# Patient Record
Sex: MALE | Race: WHITE | NOT HISPANIC OR LATINO | Employment: FULL TIME | URBAN - METROPOLITAN AREA
[De-identification: names, ages, dates, MRNs, and addresses within clinical notes are randomized per-mention and may not be internally consistent; named-entity substitution may affect disease eponyms.]

---

## 2015-01-07 LAB — CRC RECOMMENDATION EXT: NORMAL

## 2017-05-05 ENCOUNTER — OFFICE VISIT (OUTPATIENT)
Dept: INTERNAL MEDICINE | Facility: CLINIC | Age: 52
End: 2017-05-05
Payer: COMMERCIAL

## 2017-05-05 VITALS
HEIGHT: 65 IN | DIASTOLIC BLOOD PRESSURE: 74 MMHG | BODY MASS INDEX: 30.93 KG/M2 | OXYGEN SATURATION: 98 % | WEIGHT: 185.63 LBS | HEART RATE: 61 BPM | SYSTOLIC BLOOD PRESSURE: 114 MMHG | TEMPERATURE: 97 F

## 2017-05-05 DIAGNOSIS — M54.9 BACK PAIN, UNSPECIFIED BACK LOCATION, UNSPECIFIED BACK PAIN LATERALITY, UNSPECIFIED CHRONICITY: ICD-10-CM

## 2017-05-05 DIAGNOSIS — T14.8XXA MUSCLE STRAIN: Primary | ICD-10-CM

## 2017-05-05 DIAGNOSIS — Z00.00 PREVENTATIVE HEALTH CARE: ICD-10-CM

## 2017-05-05 PROCEDURE — 99999 PR PBB SHADOW E&M-EST. PATIENT-LVL III: CPT | Mod: PBBFAC,,, | Performed by: INTERNAL MEDICINE

## 2017-05-05 PROCEDURE — 1160F RVW MEDS BY RX/DR IN RCRD: CPT | Mod: S$GLB,,, | Performed by: INTERNAL MEDICINE

## 2017-05-05 PROCEDURE — 99214 OFFICE O/P EST MOD 30 MIN: CPT | Mod: S$GLB,,, | Performed by: INTERNAL MEDICINE

## 2017-05-05 RX ORDER — PHENYLPROPANOLAMINE/CLEMASTINE 75-1.34MG
2 TABLET, EXTENDED RELEASE ORAL
COMMUNITY
End: 2018-02-28 | Stop reason: ALTCHOICE

## 2017-05-05 RX ORDER — CYCLOBENZAPRINE HCL 10 MG
10 TABLET ORAL 3 TIMES DAILY PRN
Qty: 30 TABLET | Refills: 0 | Status: SHIPPED | OUTPATIENT
Start: 2017-05-05 | End: 2017-06-02

## 2017-05-05 RX ORDER — NAPROXEN 500 MG/1
500 TABLET ORAL 2 TIMES DAILY WITH MEALS
Qty: 30 TABLET | Refills: 0 | Status: SHIPPED | OUTPATIENT
Start: 2017-05-05 | End: 2017-07-04

## 2017-05-05 NOTE — MR AVS SNAPSHOT
[facility]  [address]  [place] [address]  Phone: [phone]  Fax: [phone]                  Vinny Stover   2017 2:40 PM   Office Visit    Description:  Male : 1965   Provider:  Juan Best MD   Department:  [facility]           Reason for Visit     Back Pain           Diagnoses this Visit        Comments    Muscle strain    -  Primary     Back pain, unspecified back location, unspecified back pain laterality, unspecified chronicity         Preventative health care                To Do List           Future Appointments        Provider Department Dept Phone    2017 7:45 AM LABORATORY, [facility] [phone]    2017 2:20 PM Juan Best MD [facility] [phone]      Goals (5 Years of Data)     None      Follow-Up and Disposition     Return in about 4 weeks (around 2017), or if symptoms worsen or fail to improve.       These Medications        Disp Refills Start End    naproxen (NAPROSYN) 500 MG tablet 30 tablet 0 2017    Take 1 tablet (500 mg total) by mouth 2 (two) times daily with meals. For pain / inflammation - Oral    Pharmacy: [id number]  [address] Ph #: [phone]       cyclobenzaprine (FLEXERIL) 10 MG tablet 30 tablet 0 2017 5/15/2017    Take 1 tablet (10 mg total) by mouth 3 (three) times daily as needed for Muscle spasms. - Oral    Pharmacy: [facility] - [address] Ph #: [phone]         [facility]     [facility] Nurse Care Line -  Assistance  Unless otherwise directed by your provider, please contact [facility], our nurse care line that is available for  assistance.     Registered nurses in the [facility] provide: appointment scheduling, clinical advisement, health education, and other advisory services.  Call: [phone] (toll  "free)               Medications           Message regarding Medications     Verify the changes and/or additions to your medication regime listed below are the same as discussed with your clinician today.  If any of these changes or additions are incorrect, please notify your healthcare provider.        START taking these NEW medications        Refills    naproxen (NAPROSYN) 500 MG tablet 0    Sig: Take 1 tablet (500 mg total) by mouth 2 (two) times daily with meals. For pain / inflammation    Class: Normal    Route: Oral    cyclobenzaprine (FLEXERIL) 10 MG tablet 0    Sig: Take 1 tablet (10 mg total) by mouth 3 (three) times daily as needed for Muscle spasms.    Class: Normal    Route: Oral           Verify that the below list of medications is an accurate representation of the medications you are currently taking.  If none reported, the list may be blank. If incorrect, please contact your healthcare provider. Carry this list with you in case of emergency.           Current Medications     ibuprofen (ADVIL LIQUI-GEL) 200 mg Cap Take 2 capsules by mouth every 4 to 6 hours as needed.    cyclobenzaprine (FLEXERIL) 10 MG tablet Take 1 tablet (10 mg total) by mouth 3 (three) times daily as needed for Muscle spasms.    naproxen (NAPROSYN) 500 MG tablet Take 1 tablet (500 mg total) by mouth 2 (two) times daily with meals. For pain / inflammation           Clinical Reference Information           Your Vitals Were     BP Pulse Temp Height Weight SpO2    114/74 (BP Location: Right arm, Patient Position: Sitting) 61 97 °F (36.1 °C) (Tympanic) 5' 5" (1.651 m) 84.2 kg (185 lb 10 oz) 98%    BMI                30.89 kg/m2          Blood Pressure          Most Recent Value    BP  114/74      Allergies as of 5/5/2017     No Known Allergies      Immunizations Administered on Date of Encounter - 5/5/2017     None      Orders Placed During Today's Visit     Future Labs/Procedures Expected by Expires    CBC auto differential  5/5/2017 " 7/4/2018    Comprehensive metabolic panel  5/5/2017 7/4/2018    Hemoglobin A1c  5/5/2017 5/5/2018    Lipid panel  5/5/2017 7/4/2018    PSA, Screening  5/5/2017 7/4/2018    TSH  5/5/2017 7/4/2018    Vitamin D  5/5/2017 7/4/2018      [facility] Sign-Up     Activating your [facility] account is as easy as 1-2-3!     1) Visit [email or web address], select Sign Up Now, enter this activation code and your date of birth, then select Next.  [email or web address]  Expires: 6/19/2017  3:09 PM      2) Create a username and password to use when you visit [facility] in the future and select a security question in case you lose your password and select Next.    3) Enter your e-mail address and click Sign Up!    Additional Information  If you have questions, please e-mail [email or web address] or call [phone] to talk to our [facility] staff. Remember, [facility] is NOT to be used for urgent needs. For medical emergencies, dial 911.         Language Assistance Services     ATTENTION: Language assistance services are available, free of charge. Please call [phone].      ATENCIÓN: Si habla español, tiene a babin disposición servicios gratuitos de asistencia lingüística. Llame al [phone].     CHÚ Ý: N?u b?n nói Ti?ng Vi?t, có các d?ch v? h? tr? ngôn ng? mi?n phí dành cho b?n. G?i s? [phone].         [facility] complies with applicable Federal civil rights laws and does not discriminate on the basis of race, color, national origin, age, disability, or sex.

## 2017-05-05 NOTE — PROGRESS NOTES
"Subjective:      Patient ID: Vinny Stover is a 52 y.o. male.    Chief Complaint: Back Pain (lower, x 2 weeks)    HPI Comments: 53 yo with There is no problem list on file for this patient.    Past Medical History:  No date: Colon polyp        Back Pain   This is a new problem. The current episode started 1 to 4 weeks ago. The problem occurs intermittently. The problem has been gradually worsening since onset. Pain location: left lower back. The quality of the pain is described as aching. The pain does not radiate. Pain severity now: mod to severe. Exacerbated by: walking longer distances. Stiffness is present at night. Pertinent negatives include no leg pain or numbness. Risk factors include obesity. He has tried ice and heat (advil 400 bid) for the symptoms. The treatment provided moderate relief.     Review of Systems   Musculoskeletal: Positive for back pain.   Neurological: Negative for numbness.     Objective:   /74 (BP Location: Right arm, Patient Position: Sitting)  Pulse 61  Temp 97 °F (36.1 °C) (Tympanic)   Ht 5' 5" (1.651 m)  Wt 84.2 kg (185 lb 10 oz)  SpO2 98%  BMI 30.89 kg/m2    Physical Exam   Constitutional: He is oriented to person, place, and time. He appears well-developed and well-nourished. No distress.   Eyes: Conjunctivae and EOM are normal.   Cardiovascular: Normal rate and regular rhythm.    Pulmonary/Chest: Effort normal and breath sounds normal.   Abdominal: Soft. Bowel sounds are normal.   Musculoskeletal: He exhibits no edema.        Lumbar back: He exhibits normal range of motion, no tenderness, no bony tenderness and no swelling.   Pain reproduced with extension of back.    Neurological: He is oriented to person, place, and time. He has normal reflexes. He displays normal reflexes. He exhibits normal muscle tone.   Skin: Skin is warm and dry.   Psychiatric: He has a normal mood and affect. His behavior is normal.       Assessment:     1. Muscle strain    2. Back pain, " unspecified back location, unspecified back pain laterality, unspecified chronicity    3. Preventative health care      Plan:   Muscle strain  -     naproxen (NAPROSYN) 500 MG tablet; Take 1 tablet (500 mg total) by mouth 2 (two) times daily with meals. For pain / inflammation  Dispense: 30 tablet; Refill: 0  -     cyclobenzaprine (FLEXERIL) 10 MG tablet; Take 1 tablet (10 mg total) by mouth 3 (three) times daily as needed for Muscle spasms.  Dispense: 30 tablet; Refill: 0    Back pain, unspecified back location, unspecified back pain laterality, unspecified chronicity  -     naproxen (NAPROSYN) 500 MG tablet; Take 1 tablet (500 mg total) by mouth 2 (two) times daily with meals. For pain / inflammation  Dispense: 30 tablet; Refill: 0  -     cyclobenzaprine (FLEXERIL) 10 MG tablet; Take 1 tablet (10 mg total) by mouth 3 (three) times daily as needed for Muscle spasms.  Dispense: 30 tablet; Refill: 0    Preventative health care  -     CBC auto differential; Future; Expected date: 5/5/17  -     Comprehensive metabolic panel; Future; Expected date: 5/5/17  -     Hemoglobin A1c; Future; Expected date: 5/5/17  -     Lipid panel; Future; Expected date: 5/5/17  -     Vitamin D; Future; Expected date: 5/5/17  -     TSH; Future; Expected date: 5/5/17  -     PSA, Screening; Future; Expected date: 5/5/17              Return in about 4 weeks (around 6/2/2017), or if symptoms worsen or fail to improve.

## 2017-05-12 ENCOUNTER — TELEPHONE (OUTPATIENT)
Dept: INTERNAL MEDICINE | Facility: CLINIC | Age: 52
End: 2017-05-12

## 2017-05-12 DIAGNOSIS — M54.9 BACK PAIN, UNSPECIFIED BACK LOCATION, UNSPECIFIED BACK PAIN LATERALITY, UNSPECIFIED CHRONICITY: Primary | ICD-10-CM

## 2017-05-12 NOTE — TELEPHONE ENCOUNTER
Pt stated his back is still hurting.  Stated he is taking Flexeril only at night and naproxen twice daily.  Wants to know if he needs to come back in to see you or if he needs to be seen by physiatry.  Please advise.

## 2017-05-12 NOTE — TELEPHONE ENCOUNTER
----- Message from Shaunna Solano sent at 5/12/2017  2:04 PM CDT -----  Patient calling to speak to nurse...States he was recently seen in the office and the back pain is not getting better. Would like to discuss some treatments. Please adv/call [phone].//thanks. cw

## 2017-05-12 NOTE — TELEPHONE ENCOUNTER
Spoke with pt, notified him that a physiatry referral has been placed. Patient scheduled appointment for 5/22/17 at 9 am. Pt also stated that if he takes his medicine and feels better then he will cancel appointment.

## 2017-05-22 ENCOUNTER — LAB VISIT (OUTPATIENT)
Dept: LAB | Facility: HOSPITAL | Age: 52
End: 2017-05-22
Attending: INTERNAL MEDICINE
Payer: COMMERCIAL

## 2017-05-22 DIAGNOSIS — Z00.00 PREVENTATIVE HEALTH CARE: ICD-10-CM

## 2017-05-22 LAB
25(OH)D3+25(OH)D2 SERPL-MCNC: 22 NG/ML
ALBUMIN SERPL BCP-MCNC: 3.9 G/DL
ALP SERPL-CCNC: 80 U/L
ALT SERPL W/O P-5'-P-CCNC: 39 U/L
ANION GAP SERPL CALC-SCNC: 8 MMOL/L
AST SERPL-CCNC: 26 U/L
BASOPHILS # BLD AUTO: 0.03 K/UL
BASOPHILS NFR BLD: 0.4 %
BILIRUB SERPL-MCNC: 0.4 MG/DL
BUN SERPL-MCNC: 11 MG/DL
CALCIUM SERPL-MCNC: 9.2 MG/DL
CHLORIDE SERPL-SCNC: 106 MMOL/L
CHOLEST/HDLC SERPL: 3.9 {RATIO}
CO2 SERPL-SCNC: 27 MMOL/L
COMPLEXED PSA SERPL-MCNC: 1.9 NG/ML
CREAT SERPL-MCNC: 1.1 MG/DL
DIFFERENTIAL METHOD: ABNORMAL
EOSINOPHIL # BLD AUTO: 0.5 K/UL
EOSINOPHIL NFR BLD: 7.3 %
ERYTHROCYTE [DISTWIDTH] IN BLOOD BY AUTOMATED COUNT: 12.7 %
EST. GFR  (AFRICAN AMERICAN): >60 ML/MIN/1.73 M^2
EST. GFR  (NON AFRICAN AMERICAN): >60 ML/MIN/1.73 M^2
GLUCOSE SERPL-MCNC: 101 MG/DL
HCT VFR BLD AUTO: 42.1 %
HDL/CHOLESTEROL RATIO: 25.8 %
HDLC SERPL-MCNC: 159 MG/DL
HDLC SERPL-MCNC: 41 MG/DL
HGB BLD-MCNC: 14.8 G/DL
LDLC SERPL CALC-MCNC: 100.2 MG/DL
LYMPHOCYTES # BLD AUTO: 2.4 K/UL
LYMPHOCYTES NFR BLD: 32.8 %
MCH RBC QN AUTO: 31.9 PG
MCHC RBC AUTO-ENTMCNC: 35.2 %
MCV RBC AUTO: 91 FL
MONOCYTES # BLD AUTO: 0.7 K/UL
MONOCYTES NFR BLD: 9.9 %
NEUTROPHILS # BLD AUTO: 3.6 K/UL
NEUTROPHILS NFR BLD: 49.3 %
NONHDLC SERPL-MCNC: 118 MG/DL
PLATELET # BLD AUTO: 225 K/UL
PMV BLD AUTO: 11.4 FL
POTASSIUM SERPL-SCNC: 4.4 MMOL/L
PROT SERPL-MCNC: 6.8 G/DL
RBC # BLD AUTO: 4.64 M/UL
SODIUM SERPL-SCNC: 141 MMOL/L
TRIGL SERPL-MCNC: 89 MG/DL
TSH SERPL DL<=0.005 MIU/L-ACNC: 3.08 UIU/ML
WBC # BLD AUTO: 7.29 K/UL

## 2017-05-22 PROCEDURE — 83036 HEMOGLOBIN GLYCOSYLATED A1C: CPT

## 2017-05-22 PROCEDURE — 36415 COLL VENOUS BLD VENIPUNCTURE: CPT | Mod: PO

## 2017-05-22 PROCEDURE — 80061 LIPID PANEL: CPT

## 2017-05-22 PROCEDURE — 84153 ASSAY OF PSA TOTAL: CPT

## 2017-05-22 PROCEDURE — 82306 VITAMIN D 25 HYDROXY: CPT

## 2017-05-22 PROCEDURE — 85025 COMPLETE CBC W/AUTO DIFF WBC: CPT

## 2017-05-22 PROCEDURE — 84443 ASSAY THYROID STIM HORMONE: CPT

## 2017-05-22 PROCEDURE — 80053 COMPREHEN METABOLIC PANEL: CPT

## 2017-05-23 LAB
ESTIMATED AVG GLUCOSE: 120 MG/DL
HBA1C MFR BLD HPLC: 5.8 %

## 2017-06-02 ENCOUNTER — OFFICE VISIT (OUTPATIENT)
Dept: INTERNAL MEDICINE | Facility: CLINIC | Age: 52
End: 2017-06-02
Payer: COMMERCIAL

## 2017-06-02 ENCOUNTER — HOSPITAL ENCOUNTER (OUTPATIENT)
Dept: RADIOLOGY | Facility: HOSPITAL | Age: 52
Discharge: HOME | End: 2017-06-02
Attending: INTERNAL MEDICINE
Payer: COMMERCIAL

## 2017-06-02 ENCOUNTER — PATIENT OUTREACH (OUTPATIENT)
Dept: ADMINISTRATIVE | Facility: HOSPITAL | Age: 52
End: 2017-06-02
Payer: COMMERCIAL

## 2017-06-02 VITALS
SYSTOLIC BLOOD PRESSURE: 122 MMHG | OXYGEN SATURATION: 99 % | HEIGHT: 65 IN | WEIGHT: 186.75 LBS | HEART RATE: 75 BPM | DIASTOLIC BLOOD PRESSURE: 82 MMHG | BODY MASS INDEX: 31.11 KG/M2 | TEMPERATURE: 97 F

## 2017-06-02 DIAGNOSIS — M54.9 BACK PAIN, UNSPECIFIED BACK LOCATION, UNSPECIFIED BACK PAIN LATERALITY, UNSPECIFIED CHRONICITY: ICD-10-CM

## 2017-06-02 DIAGNOSIS — R73.03 PREDIABETES: ICD-10-CM

## 2017-06-02 DIAGNOSIS — E55.9 VITAMIN D INSUFFICIENCY: ICD-10-CM

## 2017-06-02 DIAGNOSIS — Z00.00 ROUTINE GENERAL MEDICAL EXAMINATION AT A HEALTH CARE FACILITY: Primary | ICD-10-CM

## 2017-06-02 PROCEDURE — 99999 PR PBB SHADOW E&M-EST. PATIENT-LVL IV: CPT | Mod: PBBFAC,,, | Performed by: INTERNAL MEDICINE

## 2017-06-02 PROCEDURE — 72100 X-RAY EXAM L-S SPINE 2/3 VWS: CPT | Mod: TC,PO

## 2017-06-02 PROCEDURE — 72100 X-RAY EXAM L-S SPINE 2/3 VWS: CPT | Mod: 26,,, | Performed by: RADIOLOGY

## 2017-06-02 PROCEDURE — 99396 PREV VISIT EST AGE 40-64: CPT | Mod: S$GLB,,, | Performed by: INTERNAL MEDICINE

## 2017-06-02 NOTE — PROGRESS NOTES
"Subjective:      Patient ID: Vinny Stover is a 52 y.o. male.    Chief Complaint: Follow-up    51 yo with There is no problem list on file for this patient.  Past Medical History:  No date: Colon polyp    Here today for prevent annual exam. Feeling well today but continues with back pain. Energy good. Not exercising much. Never smoked.       Review of Systems   Constitutional: Negative for chills and fever.   HENT: Negative for ear pain and sore throat.    Respiratory: Negative for cough.    Cardiovascular: Negative for chest pain.   Gastrointestinal: Negative for abdominal pain and blood in stool.   Genitourinary: Negative for dysuria and hematuria.   Neurological: Negative for seizures and syncope.     Objective:   /82 (BP Location: Right arm, Patient Position: Sitting)   Pulse 75   Temp 97.2 °F (36.2 °C) (Tympanic)   Ht 5' 5" (1.651 m)   Wt 84.7 kg (186 lb 11.7 oz)   SpO2 99%   BMI 31.07 kg/m²     Physical Exam   Constitutional: He is oriented to person, place, and time. He appears well-developed and well-nourished. No distress.   HENT:   Head: Normocephalic and atraumatic.   Mouth/Throat: Oropharynx is clear and moist.   Eyes: EOM are normal. Pupils are equal, round, and reactive to light.   Neck: Neck supple. Carotid bruit is not present. No thyromegaly present.   Cardiovascular: Normal rate and regular rhythm.    Pulmonary/Chest: Breath sounds normal. He has no wheezes. He has no rales.   Abdominal: Soft. Bowel sounds are normal. There is no tenderness.   Musculoskeletal: He exhibits no edema.   Lymphadenopathy:     He has no cervical adenopathy.   Neurological: He is alert and oriented to person, place, and time.   Skin: Skin is warm and dry.   Psychiatric: He has a normal mood and affect. His behavior is normal.     Lab Visit on 05/22/2017   Component Date Value Ref Range Status    WBC 05/22/2017 7.29  3.90 - 12.70 K/uL Final    RBC 05/22/2017 4.64  4.60 - 6.20 M/uL Final    Hemoglobin " 05/22/2017 14.8  14.0 - 18.0 g/dL Final    Hematocrit 05/22/2017 42.1  40.0 - 54.0 % Final    MCV 05/22/2017 91  82 - 98 fL Final    MCH 05/22/2017 31.9* 27.0 - 31.0 pg Final    MCHC 05/22/2017 35.2  32.0 - 36.0 % Final    RDW 05/22/2017 12.7  11.5 - 14.5 % Final    Platelets 05/22/2017 225  150 - 350 K/uL Final    MPV 05/22/2017 11.4  9.2 - 12.9 fL Final    Gran # 05/22/2017 3.6  1.8 - 7.7 K/uL Final    Lymph # 05/22/2017 2.4  1.0 - 4.8 K/uL Final    Mono # 05/22/2017 0.7  0.3 - 1.0 K/uL Final    Eos # 05/22/2017 0.5  0.0 - 0.5 K/uL Final    Baso # 05/22/2017 0.03  0.00 - 0.20 K/uL Final    Gran% 05/22/2017 49.3  38.0 - 73.0 % Final    Lymph% 05/22/2017 32.8  18.0 - 48.0 % Final    Mono% 05/22/2017 9.9  4.0 - 15.0 % Final    Eosinophil% 05/22/2017 7.3  0.0 - 8.0 % Final    Basophil% 05/22/2017 0.4  0.0 - 1.9 % Final    Differential Method 05/22/2017 Automated   Final    Sodium 05/22/2017 141  136 - 145 mmol/L Final    Potassium 05/22/2017 4.4  3.5 - 5.1 mmol/L Final    Chloride 05/22/2017 106  95 - 110 mmol/L Final    CO2 05/22/2017 27  23 - 29 mmol/L Final    Glucose 05/22/2017 101  70 - 110 mg/dL Final    BUN, Bld 05/22/2017 11  6 - 20 mg/dL Final    Creatinine 05/22/2017 1.1  0.5 - 1.4 mg/dL Final    Calcium 05/22/2017 9.2  8.7 - 10.5 mg/dL Final    Total Protein 05/22/2017 6.8  6.0 - 8.4 g/dL Final    Albumin 05/22/2017 3.9  3.5 - 5.2 g/dL Final    Total Bilirubin 05/22/2017 0.4  0.1 - 1.0 mg/dL Final    Comment: For infants and newborns, interpretation of results should be based  on gestational age, weight and in agreement with clinical  observations.  Premature Infant recommended reference ranges:  Up to 24 hours.............<8.0 mg/dL  Up to 48 hours............<12.0 mg/dL  3-5 days..................<15.0 mg/dL  6-29 days.................<15.0 mg/dL      Alkaline Phosphatase 05/22/2017 80  55 - 135 U/L Final    AST 05/22/2017 26  10 - 40 U/L Final    ALT 05/22/2017 39  10 - 44  U/L Final    Anion Gap 05/22/2017 8  8 - 16 mmol/L Final    eGFR if African American 05/22/2017 >60.0  >60 mL/min/1.73 m^2 Final    eGFR if non African American 05/22/2017 >60.0  >60 mL/min/1.73 m^2 Final    Comment: Calculation used to obtain the estimated glomerular filtration  rate (eGFR) is the CKD-EPI equation. Since race is unknown   in our information system, the eGFR values for   -American and Non--American patients are given   for each creatinine result.      Hemoglobin A1C 05/23/2017 5.8  4.5 - 6.2 % Final    Comment: According to ADA guidelines, hemoglobin A1C <7.0% represents  optimal control in non-pregnant diabetic patients.  Different  metrics may apply to specific populations.   Standards of Medical Care in Diabetes - 2016.  For the purpose of screening for the presence of diabetes:  <5.7%     Consistent with the absence of diabetes  5.7-6.4%  Consistent with increasing risk for diabetes   (prediabetes)  >or=6.5%  Consistent with diabetes  Currently no consensus exists for use of hemoglobin A1C  for diagnosis of diabetes for children.      Estimated Avg Glucose 05/23/2017 120  68 - 131 mg/dL Final    Cholesterol 05/22/2017 159  120 - 199 mg/dL Final    Comment: The National Cholesterol Education Program (NCEP) has set the  following guidelines (reference ranges) for Cholesterol:  Optimal.....................<200 mg/dL  Borderline High.............200-239 mg/dL  High........................> or = 240 mg/dL      Triglycerides 05/22/2017 89  30 - 150 mg/dL Final    Comment: The National Cholesterol Education Program (NCEP) has set the  following guidelines (reference values) for triglycerides:  Normal......................<150 mg/dL  Borderline High.............150-199 mg/dL  High........................200-499 mg/dL      HDL 05/22/2017 41  40 - 75 mg/dL Final    Comment: The National Cholesterol Education Program (NCEP) has set the  following guidelines (reference values) for HDL  Cholesterol:  Low...............<40 mg/dL  Optimal...........>60 mg/dL      LDL Cholesterol 05/22/2017 100.2  63.0 - 159.0 mg/dL Final    Comment: The National Cholesterol Education Program (NCEP) has set the  following guidelines (reference values) for LDL Cholesterol:  Optimal.......................<130 mg/dL  Borderline High...............130-159 mg/dL  High..........................160-189 mg/dL  Very High.....................>190 mg/dL      HDL/Chol Ratio 05/22/2017 25.8  20.0 - 50.0 % Final    Total Cholesterol/HDL Ratio 05/22/2017 3.9  2.0 - 5.0 Final    Non-HDL Cholesterol 05/22/2017 118  mg/dL Final    Comment: Risk category and Non-HDL cholesterol goals:  Coronary heart disease (CHD)or equivalent (10-year risk of CHD >20%):  Non-HDL cholesterol goal     <130 mg/dL  Two or more CHD risk factors and 10-year risk of CHD <= 20%:  Non-HDL cholesterol goal     <160 mg/dL  0 to 1 CHD risk factor:  Non-HDL cholesterol goal     <190 mg/dL      Vit D, 25-Hydroxy 05/22/2017 22* 30 - 96 ng/mL Final    Comment: Vitamin D deficiency.........<10 ng/mL                              Vitamin D insufficiency......10-29 ng/mL       Vitamin D sufficiency........> or equal to 30 ng/mL  Vitamin D toxicity............>100 ng/mL      TSH 05/22/2017 3.085  0.400 - 4.000 uIU/mL Final    PSA, SCREEN 05/22/2017 1.9  0.00 - 4.00 ng/mL Final    Comment: PSA Expected levels:  Hormonal Therapy: <0.05 ng/ml  Prostatectomy: <0.01 ng/ml  Radiation Therapy: <1.00 ng/ml     .      Assessment:     1. Routine general medical examination at a health care facility    2. Prediabetes    3. Vitamin D insufficiency    4. Back pain, unspecified back location, unspecified back pain laterality, unspecified chronicity      Plan:   Routine general medical examination at a health care facility  -     PSA, Screening; Future; Expected date: 06/02/2018  -     TSH; Future; Expected date: 06/02/2018  -     Vitamin D; Future; Expected date: 06/02/2018  -      Lipid panel; Future; Expected date: 06/02/2018  -     Comprehensive metabolic panel; Future; Expected date: 06/02/2018  -     Hemoglobin A1c; Future; Expected date: 06/02/2018  -     CBC auto differential; Future; Expected date: 06/02/2018    Prediabetes    Vitamin D insufficiency    Back pain, unspecified back location, unspecified back pain laterality, unspecified chronicity  -     Ambulatory referral to Physical Medicine Rehab  -     X-Ray Lumbar Spine Ap And Lateral; Future; Expected date: 06/02/2017    Vitamin D3 over counter 1000 IU daily    Diet, exercise, wt loss. Diabetic diet discussed.        Return in about 1 year (around 6/2/2018), or if symptoms worsen or fail to improve.

## 2017-06-12 ENCOUNTER — TELEPHONE (OUTPATIENT)
Dept: INTERNAL MEDICINE | Facility: CLINIC | Age: 52
End: 2017-06-12

## 2017-06-12 NOTE — TELEPHONE ENCOUNTER
Notified pt that x-ray showed arthritis with recommendation to continue current treatment plan.  Pt verbalized understanding.

## 2017-06-12 NOTE — TELEPHONE ENCOUNTER
----- Message from Marli Madrid sent at 6/12/2017  9:03 AM CDT -----  Contact: Patient  Patient returned call. He can be contacted at [phone].    Thanks,  Marli

## 2017-06-12 NOTE — TELEPHONE ENCOUNTER
----- Message from Marie Gutierrez sent at 6/12/2017  8:17 AM CDT -----  Contact: pt  Pt is calling nurse staff to discuss  patient xray results. Pt call back [phone] to be advised thanks

## 2018-01-17 ENCOUNTER — OFFICE VISIT (OUTPATIENT)
Dept: INTERNAL MEDICINE | Facility: CLINIC | Age: 53
End: 2018-01-17
Payer: COMMERCIAL

## 2018-01-17 VITALS
HEIGHT: 65 IN | WEIGHT: 183 LBS | SYSTOLIC BLOOD PRESSURE: 110 MMHG | DIASTOLIC BLOOD PRESSURE: 70 MMHG | BODY MASS INDEX: 30.49 KG/M2 | HEART RATE: 77 BPM | TEMPERATURE: 97 F

## 2018-01-17 DIAGNOSIS — J32.0 MAXILLARY SINUSITIS, UNSPECIFIED CHRONICITY: Primary | ICD-10-CM

## 2018-01-17 DIAGNOSIS — R05.9 COUGH: ICD-10-CM

## 2018-01-17 PROCEDURE — 99214 OFFICE O/P EST MOD 30 MIN: CPT | Mod: S$GLB,,, | Performed by: FAMILY MEDICINE

## 2018-01-17 PROCEDURE — 99999 PR PBB SHADOW E&M-EST. PATIENT-LVL III: CPT | Mod: PBBFAC,,, | Performed by: FAMILY MEDICINE

## 2018-01-17 RX ORDER — PROMETHAZINE HYDROCHLORIDE AND DEXTROMETHORPHAN HYDROBROMIDE 6.25; 15 MG/5ML; MG/5ML
5 SYRUP ORAL 2 TIMES DAILY PRN
Qty: 100 ML | Refills: 0 | Status: SHIPPED | OUTPATIENT
Start: 2018-01-17 | End: 2018-01-17 | Stop reason: SDUPTHER

## 2018-01-17 RX ORDER — PROMETHAZINE HYDROCHLORIDE AND DEXTROMETHORPHAN HYDROBROMIDE 6.25; 15 MG/5ML; MG/5ML
5 SYRUP ORAL 2 TIMES DAILY PRN
Qty: 100 ML | Refills: 0 | Status: SHIPPED | OUTPATIENT
Start: 2018-01-17 | End: 2018-01-24

## 2018-01-17 RX ORDER — AMOXICILLIN AND CLAVULANATE POTASSIUM 500; 125 MG/1; MG/1
1 TABLET, FILM COATED ORAL 2 TIMES DAILY
Qty: 20 TABLET | Refills: 0 | Status: SHIPPED | OUTPATIENT
Start: 2018-01-17 | End: 2018-01-17 | Stop reason: SDUPTHER

## 2018-01-17 RX ORDER — AMOXICILLIN AND CLAVULANATE POTASSIUM 500; 125 MG/1; MG/1
1 TABLET, FILM COATED ORAL 2 TIMES DAILY
Qty: 20 TABLET | Refills: 0 | Status: SHIPPED | OUTPATIENT
Start: 2018-01-17 | End: 2018-01-29 | Stop reason: ALTCHOICE

## 2018-01-17 NOTE — PROGRESS NOTES
"Subjective:       Patient ID: Vinny Stover is a 52 y.o. male.    Chief Complaint: Dysphagia    F/U:       Pt is a 52 year old who is presenting with cough and sore throat but feels he has been also having difficulties sleeping at night "it just feels like something stuck." Pt has not had any fevers with sinus congestion and pressure.       Review of Systems   Constitutional: Negative.    HENT: Positive for sinus pain and sinus pressure.    Respiratory: Positive for cough. Negative for choking and shortness of breath.    Cardiovascular: Negative.    Gastrointestinal: Negative.    Musculoskeletal: Negative.    Hematological: Negative.    Psychiatric/Behavioral: Negative.        Objective:      Physical Exam   Constitutional: He appears well-developed and well-nourished.   HENT:   Right Ear: Tympanic membrane is erythematous.   Left Ear: Tympanic membrane is erythematous.   Nose: Mucosal edema present. Right sinus exhibits maxillary sinus tenderness. Left sinus exhibits maxillary sinus tenderness.   Mouth/Throat: Posterior oropharyngeal erythema present.   Cardiovascular: Regular rhythm.  Exam reveals no friction rub.    No murmur heard.  Pulmonary/Chest: Effort normal and breath sounds normal. He has no wheezes. He has no rales.       Assessment:       1. Maxillary sinusitis, unspecified chronicity    2. Cough        Plan:       Maxillary sinusitis, unspecified chronicity  Comments:  Augmentin 500 mg bid    Cough  Comments:  Phenergan DM    Other orders  -     Discontinue: amoxicillin-clavulanate 500-125mg (AUGMENTIN) 500-125 mg Tab; Take 1 tablet (500 mg total) by mouth 2 (two) times daily.  Dispense: 20 tablet; Refill: 0  -     Discontinue: promethazine-dextromethorphan (PROMETHAZINE-DM) 6.25-15 mg/5 mL Syrp; Take 5 mLs by mouth 2 (two) times daily as needed.  Dispense: 100 mL; Refill: 0  -     promethazine-dextromethorphan (PROMETHAZINE-DM) 6.25-15 mg/5 mL Syrp; Take 5 mLs by mouth 2 (two) times daily as " needed.  Dispense: 100 mL; Refill: 0  -     amoxicillin-clavulanate 500-125mg (AUGMENTIN) 500-125 mg Tab; Take 1 tablet (500 mg total) by mouth 2 (two) times daily.  Dispense: 20 tablet; Refill: 0

## 2018-01-24 ENCOUNTER — OFFICE VISIT (OUTPATIENT)
Dept: INTERNAL MEDICINE | Facility: CLINIC | Age: 53
End: 2018-01-24
Payer: COMMERCIAL

## 2018-01-24 VITALS
WEIGHT: 181 LBS | DIASTOLIC BLOOD PRESSURE: 88 MMHG | HEART RATE: 72 BPM | HEIGHT: 65 IN | BODY MASS INDEX: 30.16 KG/M2 | TEMPERATURE: 98 F | SYSTOLIC BLOOD PRESSURE: 132 MMHG | OXYGEN SATURATION: 99 %

## 2018-01-24 DIAGNOSIS — J02.9 SORE THROAT: ICD-10-CM

## 2018-01-24 DIAGNOSIS — J06.9 URI WITH COUGH AND CONGESTION: Primary | ICD-10-CM

## 2018-01-24 PROCEDURE — 99999 PR PBB SHADOW E&M-EST. PATIENT-LVL III: CPT | Mod: PBBFAC,,, | Performed by: FAMILY MEDICINE

## 2018-01-24 PROCEDURE — 99214 OFFICE O/P EST MOD 30 MIN: CPT | Mod: S$GLB,,, | Performed by: FAMILY MEDICINE

## 2018-01-24 RX ORDER — PREDNISONE 20 MG/1
TABLET ORAL
COMMUNITY
Start: 2018-01-21 | End: 2018-01-29 | Stop reason: ALTCHOICE

## 2018-01-24 NOTE — PROGRESS NOTES
"PROBLEM/CONDITION: He is a new patient. Primary complaint is nasal and throat "congestion". QUALITY described as aching discomfort. LOCATION is posterior throat bilaterally. SEVERITY is HEALTH MAINTENANCE REVIEW  Health Maintenance   Topic Date Due    TETANUS VACCINE  04/15/1984    Influenza Vaccine  08/01/2017    Colonoscopy  01/07/2020    Lipid Panel  05/22/2022        HEALTH MAINTENANCE INTERVENTIONS - DUE OR DUE SOON  Health Maintenance Due   Topic Date Due    TETANUS VACCINE  04/15/1984    Influenza Vaccine  08/01/2017       FUTURE APPOINTMENTS  Future Appointments  Date Time Provider Department Center   6/1/2018 8:00 AM LABORATORY, [facility]       CHIEF COMPLAINT  Nasal Congestion and Insomnia      HISTORY OF PRESENT ILLNESS    PROBLEM/CONDITION: Primary complaint is nasal and throat "congestion". QUALITY described as aching discomfort. LOCATION is posterior throat bilaterally. SEVERITY is MILD. ONSET was over the last week or so. ALLEVIATING FACTORS included IM injection of corticosteroids. He was also given prescription for prednisone 20 mg daily for 4 days, but he says that he has not started that medication. TIMING of symptoms described as gradually improving, but worse at night, in the sense of uncomfortable congestion in his throat makes him feel as though he is being choked, when he lies flat in the supine position. He has only MILD to MODERATE risk factors for obstructive sleep apnea, and he denies daytime hypersomnolence. His neck circumference is 47.2 cm. His oropharynx is Mallampati class 1. He denies difficulty swallowing solids or liquids. He denies sensation of food getting stuck in his throat. We discussed differential diagnosis, including possibility of obstructive sleep apnea precipitated or EXACERBATED by the soft tissue congestion. We discussed risks and benefits of treatment options. It was agreed to treat this with the prednisone 20 mg daily for 4 days as prescribed, and " "watchful waiting. If symptoms failed to resolve over the next week or so, I recommended consideration of ENT consult.    No other complaints or concerns reported.    Problem List Items Addressed This Visit     None      Visit Diagnoses     URI with cough and congestion    -  Primary    Sore throat              REVIEW OF SYSTEMS  PULMONARY: No hemoptysis reported.  CARDIOVASCULAR: No angina or orthopnea reported.  ENT: No globus sensation reported.     PHYSICAL EXAM  Vitals:    01/24/18 1619   BP: 132/88   BP Location: Right arm   Patient Position: Sitting   BP Method: Medium (Manual)   Pulse: 72   Temp: 98 °F (36.7 °C)   TempSrc: Tympanic   SpO2: 99%   Weight: 82.1 kg (181 lb)   Height: 5' 5" (1.651 m)   CONSTITUTIONAL: Vital signs noted. No apparent distress. Does not appear acutely ill or septic. Appears adequately hydrated.  EYES: Pupils equal and reactive. Extraocular movements intact. Sclerae anicteric. Lids and conjunctiva unremarkable.  ENT: External ENT grossly unremarkable. Ear canals and visualized tympanic membranes are unremarkable. Hearing grossly intact. Nasal mucosa pink. Oropharynx moist without lesion, inflammation or exudate. Posterior oropharynx is symmetric.   NECK: Neck supple without meningismus. Trachea midline. No significant cervical lymphadenopathy.  PULM: Lungs clear. Breathing unlabored.  HEART: Auscultation reveals regular rate and rhythm without murmur, gallop or rub. No carotid bruit.  GI: Abdomen soft and nontender. Bowel sounds present.  DERM: Skin warm and moist with normal turgor.  NEURO: Strength is reasonably symmetric without gross focal motor deficits or gross deficits of cranial nerves III-XII.  PSYCH: Alert and oriented x 3. Mood is grossly euthymic. Affect appropriate. Judgment and insight not grossly compromised.  MSK: Grossly normal stance and gait.     PAST MEDICAL HISTORY, FAMILY HISTORY, SOCIAL HISTORY, CURRENT MEDICATION LIST, and ALLERGY LIST reviewed by " "me (GAL Garcia MD) and are updated consistent with the patient's report.    ASSESSMENT and PLAN  URI with cough and congestion    Sore throat        PRESCRIPTION MEDICATION MANAGEMENT  Medication List with Changes/Refills   Current Medications    AMOXICILLIN-CLAVULANATE 500-125MG (AUGMENTIN) 500-125 MG TAB    Take 1 tablet (500 mg total) by mouth 2 (two) times daily.    IBUPROFEN (ADVIL LIQUI-GEL) 200 MG CAP    Take 2 capsules by mouth every 4 to 6 hours as needed.    PREDNISONE (DELTASONE) 20 MG TABLET       Discontinued Medications    PROMETHAZINE-DEXTROMETHORPHAN (PROMETHAZINE-DM) 6.25-15 MG/5 ML SYRP    Take 5 mLs by mouth 2 (two) times daily as needed.       Follow-up if symptoms worsen or fail to improve.    ABOUT THIS DOCUMENTATION:  · The order of the conditions listed in the HPI is one of convenience and does not necessarily reflect the chronology of the appointment, nor the relative importance of a condition. It is possible that additional description or status details about condition(s) may be found elsewhere in the EHR documentation for today's encounter.  · Documentation entered by me for this encounter was done in part using speech-recognition technology. Although I have made an effort to ensure accuracy, "sound like" errors may exist and should be interpreted in context.                        -GAL Garcia MD    There are no Patient Instructions on file for this visit.    "

## 2018-01-29 ENCOUNTER — OFFICE VISIT (OUTPATIENT)
Dept: OTOLARYNGOLOGY | Facility: CLINIC | Age: 53
End: 2018-01-29
Payer: COMMERCIAL

## 2018-01-29 VITALS
BODY MASS INDEX: 29.94 KG/M2 | DIASTOLIC BLOOD PRESSURE: 81 MMHG | SYSTOLIC BLOOD PRESSURE: 129 MMHG | WEIGHT: 179.88 LBS | HEART RATE: 64 BPM | TEMPERATURE: 98 F

## 2018-01-29 DIAGNOSIS — K21.9 LARYNGOPHARYNGEAL REFLUX (LPR): Primary | ICD-10-CM

## 2018-01-29 PROCEDURE — 99999 PR PBB SHADOW E&M-EST. PATIENT-LVL II: CPT | Mod: PBBFAC,,, | Performed by: OTOLARYNGOLOGY

## 2018-01-29 PROCEDURE — 99203 OFFICE O/P NEW LOW 30 MIN: CPT | Mod: 25,S$GLB,, | Performed by: OTOLARYNGOLOGY

## 2018-01-29 PROCEDURE — 31575 DIAGNOSTIC LARYNGOSCOPY: CPT | Mod: S$GLB,,, | Performed by: OTOLARYNGOLOGY

## 2018-01-29 RX ORDER — PANTOPRAZOLE SODIUM 40 MG/1
40 TABLET, DELAYED RELEASE ORAL DAILY
Qty: 14 TABLET | Refills: 0 | Status: SHIPPED | OUTPATIENT
Start: 2018-01-29 | End: 2018-02-28 | Stop reason: SDUPTHER

## 2018-01-29 NOTE — PROGRESS NOTES
Referring Provider:    No referring provider defined for this encounter.  Subjective:   Patient: Vinny Stover [id number], :1965   Visit date:2018 4:09 PM    Chief Complaint:  Sinus Problem and Sore Throat (feels like while laying down airway is closing )    HPI:  Vinny is a 52 y.o. male who I was asked to see in consultation for evaluation of the following issue(s):     Vinny was recently treated for a severe sinus infection by primary care.  He was given a steroid shot then placed on oral steroids.  He was also placed on augmentin.  His sinus pressure/pain have resolved and he has less congestion.  However, he has been having fairly severe insomnia and increased anxiety.  He feels like when he lies flat that something is causing his throat to close.  He has also had fairly severe reflux and has been trying to treat this with antacids such as TUMS.  He has been sleeping in a recliner for the past few nights.  He reports severe difficulty falling asleep but once asleep, he does not wake feeling short of breath.      Review of Systems:  -     Allergic/Immunologic: has No Known Allergies..  -     Constitutional: Current temp: 98.2 °F (36.8 °C) (Tympanic)      His meds, allergies, medical, surgical, social & family histories were reviewed & updated:  -     He has a current medication list which includes the following prescription(s): ibuprofen, cheratussin ac, and pantoprazole.  -     He  has a past medical history of Colon polyp.   -     He  does not have any pertinent problems on file.   -     He  has a past surgical history that includes Nose surgery and Colonoscopy ().  -     He  reports that he has never smoked. He has never used smokeless tobacco. He reports that he does not drink alcohol or use drugs.  -     His family history includes Colon cancer in his paternal grandfather; Drug abuse in his mother; No Known Problems in his father.  -     He has No Known Allergies.    Objective:      Physical Exam:  Vitals:  /81   Pulse 64   Temp 98.2 °F (36.8 °C) (Tympanic)   Wt 81.6 kg (179 lb 14.3 oz)   BMI 29.94 kg/m²   General appearance:  Well developed, well nourished    Eyes:  Extraocular motions intact, PERRL    Communication:  no hoarseness, no dysphonia    Ears:  Otoscopy of external auditory canals and tympanic membranes was normal, clinical speech reception thresholds grossly intact, no mass/lesion of auricle.  Nose:  No masses/lesions of external nose, nasal mucosa, septum, and turbinates were within normal limits.  Mouth:  No mass/lesion of lips, teeth, gums, hard/soft palate, tongue, tonsils, or oropharynx.    Cardiovascular:  No pedal edema; Radial Pulses +2     Neck & Lymphatics:  No cervical lymphadenopathy, no neck mass/crepitus/ asymmetry, trachea is midline, no thyroid enlargement/tenderness/mass.    Psych: Oriented x3,  Alert, somewhat anxious appearing with pressured speech.     Respiration/Chest:  Symmetric expansion during respiration, normal respiratory effort.    Skin:  Warm and intact. No ulcerations of face, scalp, neck.      Assessment & Plan:       Suspect steroid induced insomnia, anxiety and reflux resulting in throat discomfort.  Recommend protonix for 2 weeks.         We discussed his medical conditions, treatments and plan.  Vinny should return to clinic if any issues arise (symptoms worsen or persist), otherwise we will see him back in the clinic only as needed.      Patient: Vinny Stover [id number], :1965  Procedure date:2018  Patient's medications, allergies, past medical, surgical, social and family histories were reviewed and updated as appropriate.  Chief Complaint:  Sinus Problem and Sore Throat (feels like while laying down airway is closing )    HPI:  Vinny is a 52 y.o. male with the history of present illness as discussed in the clinic note from today.    Procedure: Risks, benefits, and alternatives of the procedure were discussed  with the patient, and the patient consented to the fiberoptic examination.  We applied a topical nasal decongestant and analgesic.  After adequate anesthesia was obtained, the flexible fiberoptic scope was passed into each nostril independently.  Each nasal cavity, the entire pharynx (nasopharynx to hypopharynx) and the larynx were visualized. At the end of the examination, the scope was removed. The patient tolerated the procedure well with no complications.     Findings:  -     Laryngeal mucosa is normal  -     Posterior commissure has moderate hypertrophy  -     Lingual tonsils have no hypertrophy  -     Adenoids have no  hypertrophy  -     Right vocal fold: normal mobility     mass/lesion: none  -     Left vocal fold: normal mobility     mass/lesion: none  -     Other findings: none    Assessment & Plan:  - see today's clinic note        Thank you for allowing me to participate in the care of Danielle Wilson MD

## 2018-02-05 ENCOUNTER — OFFICE VISIT (OUTPATIENT)
Dept: INTERNAL MEDICINE | Facility: CLINIC | Age: 53
End: 2018-02-05
Payer: COMMERCIAL

## 2018-02-05 VITALS
OXYGEN SATURATION: 99 % | DIASTOLIC BLOOD PRESSURE: 86 MMHG | WEIGHT: 177.25 LBS | HEART RATE: 73 BPM | TEMPERATURE: 98 F | HEIGHT: 65 IN | SYSTOLIC BLOOD PRESSURE: 122 MMHG | BODY MASS INDEX: 29.53 KG/M2

## 2018-02-05 DIAGNOSIS — G47.01 INSOMNIA DUE TO MEDICAL CONDITION: Primary | ICD-10-CM

## 2018-02-05 PROBLEM — J32.0 MAXILLARY SINUSITIS: Status: RESOLVED | Noted: 2018-01-17 | Resolved: 2018-02-05

## 2018-02-05 PROBLEM — R05.9 COUGH: Status: RESOLVED | Noted: 2018-01-17 | Resolved: 2018-02-05

## 2018-02-05 PROCEDURE — 99999 PR PBB SHADOW E&M-EST. PATIENT-LVL III: CPT | Mod: PBBFAC,,, | Performed by: FAMILY MEDICINE

## 2018-02-05 PROCEDURE — 3008F BODY MASS INDEX DOCD: CPT | Mod: S$GLB,,, | Performed by: FAMILY MEDICINE

## 2018-02-05 PROCEDURE — 99213 OFFICE O/P EST LOW 20 MIN: CPT | Mod: S$GLB,,, | Performed by: FAMILY MEDICINE

## 2018-02-05 RX ORDER — CYCLOBENZAPRINE HCL 10 MG
10 TABLET ORAL NIGHTLY PRN
Qty: 30 TABLET | Refills: 0 | Status: SHIPPED | OUTPATIENT
Start: 2018-02-05 | End: 2018-02-28 | Stop reason: ALTCHOICE

## 2018-02-05 NOTE — PROGRESS NOTES
HEALTH MAINTENANCE REVIEW  Health Maintenance   Topic Date Due    TETANUS VACCINE  04/15/1984    Influenza Vaccine  08/01/2017    Colonoscopy  01/07/2020    Lipid Panel  05/22/2022        HEALTH MAINTENANCE INTERVENTIONS - DUE OR DUE SOON  Health Maintenance Due   Topic Date Due    TETANUS VACCINE  04/15/1984    Influenza Vaccine  08/01/2017       FUTURE APPOINTMENTS  Future Appointments  Date Time Provider Department Center   6/1/2018 8:00 AM LABORATORY, [facility]       CHIEF COMPLAINT  Insomnia      HISTORY OF PRESENT ILLNESS  PROBLEM/CONDITION: Previously noted ENT and respiratory symptoms have essentially resolved.    PROBLEM/CONDITION: Since taking the corticosteroids, his sleep cycle has been disrupted, and he is having significant difficulty initiating sleep. He is on no decongestant or other such stimulant. He is no longer on the corticosteroids. He denies significant anxiety or depression symptoms. We discussed risks and benefits of treatment options. He says that he had some cyclobenzaprine remaining from an old prescription, and he took this at bedtime the other night, and it resolved the insomnia and he slept well and had no hangover side effects the next morning. He is requesting a refill of that medication, as opposed to initiating a new soporific.    No other complaints or concerns reported.    Problem List Items Addressed This Visit     None      Visit Diagnoses     Insomnia due to medical condition    -  Primary    Relevant Medications    cyclobenzaprine (FLEXERIL) 10 MG tablet          REVIEW OF SYSTEMS  PSYCHIATRIC: No hallucinations or suicidal ideations reported. No mindy or hypomania reported.  CARDIOVASCULAR: No angina or orthopnea reported.    PHYSICAL EXAM  Vitals:    02/05/18 1442   BP: 122/86   BP Location: Right arm   Patient Position: Sitting   BP Method: Medium (Manual)   Pulse: 73   Temp: 98 °F (36.7 °C)   TempSrc: Tympanic   SpO2: 99%   Weight: 80.4 kg (177 lb 4  "oz)   Height: 5' 5" (1.651 m)     CONSTITUTIONAL: Vital signs noted. No apparent distress. Does not appear acutely ill or septic. Appears adequately hydrated.  PULM: Breathing unlabored.  HEART: Regular.  DERM: Skin normothermic with normal turgor.  NEURO: There are no gross focal motor deficits or gross deficits of cranial nerves III-XII.  PSYCHIATRIC: Alert and oriented x 3. Mood is grossly neutral. Affect appropriate. Judgment and insight not grossly compromised.     PAST MEDICAL HISTORY, FAMILY HISTORY, SOCIAL HISTORY, CURRENT MEDICATION LIST, and ALLERGY LIST reviewed by me (GAL Garcia MD) and are updated consistent with the patient's report.    ASSESSMENT and PLAN  Insomnia due to medical condition  -     cyclobenzaprine (FLEXERIL) 10 MG tablet; Take 1 tablet (10 mg total) by mouth nightly as needed.  Dispense: 30 tablet; Refill: 0        PRESCRIPTION MEDICATION MANAGEMENT  Medication List with Changes/Refills   New Medications    CYCLOBENZAPRINE (FLEXERIL) 10 MG TABLET    Take 1 tablet (10 mg total) by mouth nightly as needed.   Current Medications    IBUPROFEN (ADVIL LIQUI-GEL) 200 MG CAP    Take 2 capsules by mouth every 4 to 6 hours as needed.    PANTOPRAZOLE (PROTONIX) 40 MG TABLET    Take 1 tablet (40 mg total) by mouth once daily.   Discontinued Medications    CHERATUSSIN AC  MG/5 ML SYRUP    TAKE 1 TEASPOON EVERY 4 TO 6 HOURS AS NEEDED FOR COUGH       Follow-up if symptoms worsen or fail to improve.    ABOUT THIS DOCUMENTATION:  · The order of the conditions listed in the HPI is one of convenience and does not necessarily reflect the chronology of the appointment, nor the relative importance of a condition. It is possible that additional description or status details about condition(s) may be found elsewhere in the EHR documentation for today's encounter.  · Documentation entered by me for this encounter was done in part using speech-recognition technology. Although I have made an " "effort to ensure accuracy, "sound like" errors may exist and should be interpreted in context.                        -GAL Garcia MD    There are no Patient Instructions on file for this visit.    "

## 2018-02-28 ENCOUNTER — OFFICE VISIT (OUTPATIENT)
Dept: INTERNAL MEDICINE | Facility: CLINIC | Age: 53
End: 2018-02-28
Payer: COMMERCIAL

## 2018-02-28 VITALS
SYSTOLIC BLOOD PRESSURE: 140 MMHG | HEART RATE: 101 BPM | WEIGHT: 179.25 LBS | TEMPERATURE: 99 F | OXYGEN SATURATION: 98 % | DIASTOLIC BLOOD PRESSURE: 76 MMHG | HEIGHT: 65 IN | BODY MASS INDEX: 29.87 KG/M2

## 2018-02-28 DIAGNOSIS — K21.9 GASTROESOPHAGEAL REFLUX DISEASE WITHOUT ESOPHAGITIS: Primary | Chronic | ICD-10-CM

## 2018-02-28 DIAGNOSIS — F06.4 ANXIETY DISORDER DUE TO MEDICAL CONDITION: ICD-10-CM

## 2018-02-28 PROBLEM — G47.09 OTHER INSOMNIA: Status: ACTIVE | Noted: 2018-02-28

## 2018-02-28 PROCEDURE — 99214 OFFICE O/P EST MOD 30 MIN: CPT | Mod: S$GLB,,, | Performed by: FAMILY MEDICINE

## 2018-02-28 PROCEDURE — 99999 PR PBB SHADOW E&M-EST. PATIENT-LVL III: CPT | Mod: PBBFAC,,, | Performed by: FAMILY MEDICINE

## 2018-02-28 RX ORDER — SUCRALFATE 1 G/1
1 TABLET ORAL
Qty: 60 TABLET | Refills: 1 | Status: SHIPPED | OUTPATIENT
Start: 2018-02-28 | End: 2019-03-25

## 2018-02-28 RX ORDER — ALPRAZOLAM 0.5 MG/1
TABLET ORAL
Qty: 30 TABLET | Refills: 0 | Status: SHIPPED | OUTPATIENT
Start: 2018-02-28 | End: 2018-04-05 | Stop reason: DRUGHIGH

## 2018-02-28 RX ORDER — PANTOPRAZOLE SODIUM 40 MG/1
40 TABLET, DELAYED RELEASE ORAL DAILY
Qty: 30 TABLET | Refills: 11 | Status: SHIPPED | OUTPATIENT
Start: 2018-02-28 | End: 2018-05-01 | Stop reason: SDUPTHER

## 2018-02-28 NOTE — PROGRESS NOTES
HEALTH MAINTENANCE REVIEW  Health Maintenance   Topic Date Due    TETANUS VACCINE  04/15/1984    Influenza Vaccine  08/01/2017    Colonoscopy  01/07/2020    Lipid Panel  05/22/2022        HEALTH MAINTENANCE INTERVENTIONS - DUE OR DUE SOON  Health Maintenance Due   Topic Date Due    TETANUS VACCINE  04/15/1984    Influenza Vaccine  08/01/2017       FUTURE APPOINTMENTS  Future Appointments  Date Time Provider Department Center   6/1/2018 8:00 AM LABORATORY, [facility] LAB [facility]       CHIEF COMPLAINT  Follow-up      HISTORY OF PRESENT ILLNESS    PROBLEM/CONDITION: He had nasolaryngoscopy by ENT Dr. Wilson, who diagnosed him with laryngopharyngeal reflux and treated him empirically with pantoprazole. He took the medication for 2 weeks, and he reports significant, but far from complete, relief with the medication (ALLEVIATING FACTOR). QUALITY of symptoms described as burning and pressure. LOCATION is epigastric region with radiation up mid chest. ASSOCIATED SYMPTOMS include sensation of losing his breath. EXACERBATING FACTORS reproducible include recumbent positioning. In fact, he says that his symptoms occur mostly only during recumbent positioning. EXACERBATING FACTORS do NOT include general physical exertion.  ALLEVIATING FACTORS include pantoprazole and upright positioning. He describes the SEVERITY of his symptoms as fairly SEVERE. ONSET of symptoms was during/after treatment of sinus infection that included corticosteroids. He reports no vomiting, no sensation of food getting stuck in his esophagus, no melena or blood in stool, and no changes in bowel habits. We discussed differential diagnosis. Given the SEVERITY of his symptoms and the fact that he received only MODERATE improvement with 2 week course of pantoprazole, it was agreed to evaluate him further with EGD. In the meantime, I am treating him with more aggressive asset suppression therapy. I provided him patient education information on  "gastroesophageal reflux disease and encouraged behavioral and environmental modifications, including sleeping in a semi-upright position.    PROBLEM/CONDITION: He reports that since all this started, he has developed MODERATELY SEVERE anxiety over his health, and he reports that he has not been able to sleep, and he now gets anticipatory anxiety even just when going to bed. He has made reasonable efforts with nonpharmacologic interventions. We discussed risks and benefits of treatment options. He has no history of chemical dependency. It was agreed to give him a short term trial therapy with alprazolam.    No other complaints or concerns reported.    Problem List Items Addressed This Visit     Anxiety disorder due to medical condition    Relevant Medications    ALPRAZolam (XANAX) 0.5 MG tablet    Gastroesophageal reflux disease without esophagitis - Primary (Chronic)    Relevant Medications    pantoprazole (PROTONIX) 40 MG tablet    sucralfate (CARAFATE) 1 gram tablet    Other Relevant Orders    Case request GI: ESOPHAGOGASTRODUODENOSCOPY (EGD) (Completed)          REVIEW OF SYSTEMS  CONSTITUTIONAL: No fever or chills reported.   GASTROINTESTINAL: No hematemesis or melena reported.       PHYSICAL EXAM  Vitals:    02/28/18 1446   BP: (!) 140/76   BP Location: Right arm   Patient Position: Sitting   BP Method: Medium (Manual)   Pulse: 101   Temp: 99.2 °F (37.3 °C)   TempSrc: Tympanic   SpO2: 98%   Weight: 81.3 kg (179 lb 3.7 oz)   Height: 5' 5" (1.651 m)     CONSTITUTIONAL: Vital signs noted. No apparent distress. Does not appear acutely ill or septic. Appears adequately hydrated.  HEENT: External ENT grossly unremarkable. Hearing grossly intact. Oropharynx moist.  PULM: Lungs clear. Breathing unlabored.  HEART: Auscultation reveals regular rate and rhythm without murmur, gallop or rub.  ABDOMEN: Soft and nontender. Bowel sounds normal. No appreciable organomegaly or abdominal mass on palpation. Abdominal aorta " nonpalpable. No abdominal bruit.  DERM: Skin warm and moist with normal turgor.  NEURO: There are no gross focal motor deficits or gross deficits of cranial nerves III-XII.  PSYCHIATRIC: Alert and oriented x 3. Mood is grossly neutral. Affect appropriate. Judgment and insight not grossly compromised.  MUSCULOSKELETAL: Grossly normal stance and gait.     PAST MEDICAL HISTORY, FAMILY HISTORY, SOCIAL HISTORY, CURRENT MEDICATION LIST, and ALLERGY LIST reviewed by me (GAL Garcia MD) and are updated consistent with the patient's report.    ASSESSMENT and PLAN  Gastroesophageal reflux disease without esophagitis  -     Case request GI: ESOPHAGOGASTRODUODENOSCOPY (EGD)  -     pantoprazole (PROTONIX) 40 MG tablet; Take 1 tablet (40 mg total) by mouth once daily.  Dispense: 30 tablet; Refill: 11  -     sucralfate (CARAFATE) 1 gram tablet; Take 1 tablet (1 g total) by mouth 4 (four) times daily before meals and nightly.  Dispense: 60 tablet; Refill: 1    Anxiety disorder due to medical condition  -     ALPRAZolam (XANAX) 0.5 MG tablet; Take ONE-HALF to ONE tablet by mouth at bedtime as needed for anxiety or insomnia  Dispense: 30 tablet; Refill: 0        PRESCRIPTION MEDICATION MANAGEMENT  Medication List with Changes/Refills   New Medications    ALPRAZOLAM (XANAX) 0.5 MG TABLET    Take ONE-HALF to ONE tablet by mouth at bedtime as needed for anxiety or insomnia    SUCRALFATE (CARAFATE) 1 GRAM TABLET    Take 1 tablet (1 g total) by mouth 4 (four) times daily before meals and nightly.   Changed and/or Refilled Medications    Modified Medication Previous Medication    PANTOPRAZOLE (PROTONIX) 40 MG TABLET pantoprazole (PROTONIX) 40 MG tablet       Take 1 tablet (40 mg total) by mouth once daily.    Take 1 tablet (40 mg total) by mouth once daily.   Discontinued Medications    CYCLOBENZAPRINE (FLEXERIL) 10 MG TABLET    Take 1 tablet (10 mg total) by mouth nightly as needed.    IBUPROFEN (ADVIL LIQUI-GEL) 200 MG CAP     "Take 2 capsules by mouth every 4 to 6 hours as needed.       Follow-up in about 3 weeks (around 3/21/2018).    ABOUT THIS DOCUMENTATION:  · The order of the conditions listed in the HPI is one of convenience and does not necessarily reflect the chronology of the appointment, nor the relative importance of a condition. It is possible that additional description or status details about condition(s) may be found elsewhere in the EHR documentation for today's encounter.  · Documentation entered by me for this encounter was done in part using speech-recognition technology. Although I have made an effort to ensure accuracy, "sound like" errors may exist and should be interpreted in context.                        -GAL Garcia MD    Patient Instructions       Discharge Instructions for Gastroesophageal Reflux Disease (GERD)  Gastroesophageal reflux disease (GERD) is a backflow of acid from the stomach into the swallowing tube (esophagus).  Home care  These home care steps can help you manage GERD:  · Maintain a healthy weight. Get help to lose any extra pounds.  · Avoid lying down after meals.  · Avoid eating late at night.  · Elevate the head of your bed by 6 inches. You can do this by placing wooden blocks or bed risers under the head of your bed.  · Avoid wearing tight-fitting clothes.  · Avoid foods that might irritate your stomach, such as the following:  ¨ Alcohol  ¨ Fat  ¨ Chocolate  ¨ Caffeine  ¨ Spearmint or peppermint  · Talk to your healthcare provider if you are taking any of the following medicines. These medicines can make GERD symptoms worse:  ¨ Calcium channel blockers  ¨ Theophylline  ¨ Anticholinergic medicines, such as oxybutynin and benzatropine  · Begin an exercise program. Ask your healthcare provider how to get started. You can benefit from simple activities, such as walking or gardening.  · Break the smoking habit. Enroll in a stop-smoking program to improve your chances of success.  · Limit " alcohol intake to no more than 2 drinks a day.  · Take your medicines exactly as directed. Dont skip doses.  · Avoid over-the-counter nonsteroidal anti-inflammatory medicines, such as aspirin and ibuprofen, unless recommended by your healthcare provider for certain conditions.   · If possible, avoid nitrates (heart medicines, such as nitroglycerin and isosorbide dinitrate ).  Follow-up care  Make a follow-up appointment as directed by our staff.     When to call the healthcare provider  Call your healthcare provider immediately if you have any of the following:  · Trouble swallowing  · Pain when swallowing  · Feeling of food caught in your chest or throat  · Pain in the neck, chest, or back  · Heartburn that causes you to vomit  · Vomiting blood  · Black or tarry stools (from digested blood)  · More saliva (watering of the mouth) than usual  · Weight loss of more than 3% to 5% of your total body weight in a month  · Hoarseness or sore throat that wont go away  · Choking, coughing, or wheezing   Date Last Reviewed: 7/1/2016 © 2000-2017 [employer]. [address] [place] [address]. All rights reserved. This information is not intended as a substitute for professional medical care. Always follow your healthcare professional's instructions.        Tips to Control Acid Reflux    To control acid reflux, youll need to make some basic diet and lifestyle changes. The simple steps outlined below may be all youll need to ease discomfort.  Watch what you eat  · Avoid fatty foods and spicy foods.  · Eat fewer acidic foods, such as citrus and tomato-based foods. These can increase symptoms.  · Limit drinking alcohol, caffeine, and fizzy beverages. All increase acid reflux.  · Try limiting chocolate, peppermint, and spearmint. These can worsen acid reflux in some people.  Watch when you eat  · Avoid lying down for 3 hours after eating.  · Do not snack before going to bed.  Raise your head  Raising your  head and upper body by 4 to 6 inches helps limit reflux when youre lying down. Put blocks under the head of your bed frame to raise it.  Other changes  · Lose weight, if you need to  · Dont exercise near bedtime  · Avoid tight-fitting clothes  · Limit aspirin and ibuprofen  · Stop smoking   Date Last Reviewed: 7/1/2016 © 2000-2017 [employer], [employer]. [address] [place] [address], [place] [address]. All rights reserved. This information is not intended as a substitute for professional medical care. Always follow your healthcare professional's instructions.        Medicines for Acid Reflux  Your healthcare provider has told you that you have acid reflux. This condition causes stomach acid to wash up into your throat. For most people, acid reflux is troubling but not dangerous. But left untreated, acid reflux sometimes damages the esophagus. Medicines can help control acid reflux and limit your risk of future problems.  Medicines for acid reflux  Your healthcare provider may prescribe medicine to help treat your acid reflux. Medicine will be based on your symptoms and any test results. Your provider will explain how to take your medicine. You will also be told about possible side effects.  Reducing stomach acid  Your provider may suggest antacids that you can buy over the counter. Antacids can give fast relief. Or you may be told to take a type of medicine called H2 blockers. These are available over the counter and by prescription (for higher doses).  Blocking stomach acid  In more severe cases, your healthcare provider may suggest stronger medicines such as proton pump inhibitors (PPIs). These keep the stomach from making acid. They are often prescribed for long-term use.  Other medicines  In some cases medicines to reduce or block stomach acid may not work. Then you may be switched to another type of medicine that helps your stomach empty better.     Date Last Reviewed: 10/1/2016  © 1386-6731 [employer], [employer]. [address] [place] [address], [place] [address]. All rights reserved. This information is not intended as a substitute for professional medical care. Always follow your healthcare professional's instructions.

## 2018-02-28 NOTE — PATIENT INSTRUCTIONS
Discharge Instructions for Gastroesophageal Reflux Disease (GERD)  Gastroesophageal reflux disease (GERD) is a backflow of acid from the stomach into the swallowing tube (esophagus).  Home care  These home care steps can help you manage GERD:  · Maintain a healthy weight. Get help to lose any extra pounds.  · Avoid lying down after meals.  · Avoid eating late at night.  · Elevate the head of your bed by 6 inches. You can do this by placing wooden blocks or bed risers under the head of your bed.  · Avoid wearing tight-fitting clothes.  · Avoid foods that might irritate your stomach, such as the following:  ¨ Alcohol  ¨ Fat  ¨ Chocolate  ¨ Caffeine  ¨ Spearmint or peppermint  · Talk to your healthcare provider if you are taking any of the following medicines. These medicines can make GERD symptoms worse:  ¨ Calcium channel blockers  ¨ Theophylline  ¨ Anticholinergic medicines, such as oxybutynin and benzatropine  · Begin an exercise program. Ask your healthcare provider how to get started. You can benefit from simple activities, such as walking or gardening.  · Break the smoking habit. Enroll in a stop-smoking program to improve your chances of success.  · Limit alcohol intake to no more than 2 drinks a day.  · Take your medicines exactly as directed. Dont skip doses.  · Avoid over-the-counter nonsteroidal anti-inflammatory medicines, such as aspirin and ibuprofen, unless recommended by your healthcare provider for certain conditions.   · If possible, avoid nitrates (heart medicines, such as nitroglycerin and isosorbide dinitrate ).  Follow-up care  Make a follow-up appointment as directed by our staff.     When to call the healthcare provider  Call your healthcare provider immediately if you have any of the following:  · Trouble swallowing  · Pain when swallowing  · Feeling of food caught in your chest or throat  · Pain in the neck, chest, or back  · Heartburn that causes you to vomit  · Vomiting blood  · Black or  tarry stools (from digested blood)  · More saliva (watering of the mouth) than usual  · Weight loss of more than 3% to 5% of your total body weight in a month  · Hoarseness or sore throat that wont go away  · Choking, coughing, or wheezing   Date Last Reviewed: 7/1/2016 © 2000-2017 [employer]. [address] [place] [address]. All rights reserved. This information is not intended as a substitute for professional medical care. Always follow your healthcare professional's instructions.        Tips to Control Acid Reflux    To control acid reflux, youll need to make some basic diet and lifestyle changes. The simple steps outlined below may be all youll need to ease discomfort.  Watch what you eat  · Avoid fatty foods and spicy foods.  · Eat fewer acidic foods, such as citrus and tomato-based foods. These can increase symptoms.  · Limit drinking alcohol, caffeine, and fizzy beverages. All increase acid reflux.  · Try limiting chocolate, peppermint, and spearmint. These can worsen acid reflux in some people.  Watch when you eat  · Avoid lying down for 3 hours after eating.  · Do not snack before going to bed.  Raise your head  Raising your head and upper body by 4 to 6 inches helps limit reflux when youre lying down. Put blocks under the head of your bed frame to raise it.  Other changes  · Lose weight, if you need to  · Dont exercise near bedtime  · Avoid tight-fitting clothes  · Limit aspirin and ibuprofen  · Stop smoking   Date Last Reviewed: 7/1/2016 © 2000-2017 [employer]. [address] [place] [address]. All rights reserved. This information is not intended as a substitute for professional medical care. Always follow your healthcare professional's instructions.        Medicines for Acid Reflux  Your healthcare provider has told you that you have acid reflux. This condition causes stomach acid to wash up into your throat. For most people, acid reflux is  troubling but not dangerous. But left untreated, acid reflux sometimes damages the esophagus. Medicines can help control acid reflux and limit your risk of future problems.  Medicines for acid reflux  Your healthcare provider may prescribe medicine to help treat your acid reflux. Medicine will be based on your symptoms and any test results. Your provider will explain how to take your medicine. You will also be told about possible side effects.  Reducing stomach acid  Your provider may suggest antacids that you can buy over the counter. Antacids can give fast relief. Or you may be told to take a type of medicine called H2 blockers. These are available over the counter and by prescription (for higher doses).  Blocking stomach acid  In more severe cases, your healthcare provider may suggest stronger medicines such as proton pump inhibitors (PPIs). These keep the stomach from making acid. They are often prescribed for long-term use.  Other medicines  In some cases medicines to reduce or block stomach acid may not work. Then you may be switched to another type of medicine that helps your stomach empty better.     Date Last Reviewed: 10/1/2016  © 5448-6103 [employer]. [address] [place] [address], [place] [address]. All rights reserved. This information is not intended as a substitute for professional medical care. Always follow your healthcare professional's instructions.

## 2018-03-07 ENCOUNTER — DOCUMENTATION ONLY (OUTPATIENT)
Dept: ENDOSCOPY | Facility: HOSPITAL | Age: 53
End: 2018-03-07

## 2018-03-07 ENCOUNTER — PATIENT OUTREACH (OUTPATIENT)
Dept: ADMINISTRATIVE | Facility: HOSPITAL | Age: 53
End: 2018-03-07

## 2018-03-07 NOTE — PROGRESS NOTES
3/7/2018 States that he wants to wait until is F/U appt with his PCP before scheduling EGD.   States the medication he was prescribed seems to be giving him some relief.

## 2018-03-12 ENCOUNTER — INITIAL CONSULT (OUTPATIENT)
Dept: GASTROENTEROLOGY | Facility: CLINIC | Age: 53
End: 2018-03-12
Payer: COMMERCIAL

## 2018-03-12 VITALS
DIASTOLIC BLOOD PRESSURE: 88 MMHG | SYSTOLIC BLOOD PRESSURE: 134 MMHG | WEIGHT: 178.56 LBS | BODY MASS INDEX: 29.75 KG/M2 | HEART RATE: 84 BPM | HEIGHT: 65 IN

## 2018-03-12 DIAGNOSIS — G47.09 OTHER INSOMNIA: ICD-10-CM

## 2018-03-12 DIAGNOSIS — K21.9 GASTROESOPHAGEAL REFLUX DISEASE WITHOUT ESOPHAGITIS: Primary | Chronic | ICD-10-CM

## 2018-03-12 DIAGNOSIS — F06.4 ANXIETY DISORDER DUE TO MEDICAL CONDITION: ICD-10-CM

## 2018-03-12 PROCEDURE — 99204 OFFICE O/P NEW MOD 45 MIN: CPT | Mod: S$GLB,,, | Performed by: NURSE PRACTITIONER

## 2018-03-12 PROCEDURE — 99999 PR PBB SHADOW E&M-EST. PATIENT-LVL III: CPT | Mod: PBBFAC,,, | Performed by: NURSE PRACTITIONER

## 2018-03-12 NOTE — PATIENT INSTRUCTIONS
Continue Carafate at bedtime for one additional week then stop  Continue Protonix in the morning 30 minutes before breakfast as prescribed.   Will follow up in 6 weeks for resolution of symptoms.

## 2018-03-12 NOTE — PROGRESS NOTES
Clinic Consult:  [facility] Consultation Note    Reason for Consult:  The primary encounter diagnosis was Gastroesophageal reflux disease without esophagitis. Diagnoses of Anxiety disorder due to medical condition and Other insomnia were also pertinent to this visit.    PCP: DEBRA Garcia       HPI:  This is a 52 y.o. male here for evaluation of the above  Pt states that starting in January, he was on multiple medications for sinus infections.  At some point, he was evaluated by ENT for throat issues and found to have some reflux.  He was started on PPI and symptoms improved slightly.  He was then seen by PCP and Carafate was added.  Since then, he has had continued improvement over the last 2 weeks.   He continues with some intermittent reflux, but mostly diet and anxiety related.   He did have some nocturnal reflux prior to starting the medications.  Has not had any nocturnal symptoms for over 2 weeks.   He denies any abdominal pain.  No melena or hematochezia.  No change in bowel pattern.   No previous EGD.     Review of Systems   Constitutional: Negative for chills, fever, malaise/fatigue and weight loss.   Respiratory: Negative for cough.    Cardiovascular: Negative for chest pain.   Gastrointestinal:        Per HPI   Musculoskeletal: Negative for myalgias.   Skin: Negative for itching and rash.   Neurological: Negative for headaches.   Psychiatric/Behavioral: The patient is nervous/anxious and has insomnia.        Medical History:   Past Medical History:   Diagnosis Date    Colon polyp     Gastroesophageal reflux disease without esophagitis 2/28/2018       Surgical History:  Past Surgical History:   Procedure Laterality Date    COLONOSCOPY  2015    NOSE SURGERY         Family History:   Family History   Problem Relation Age of Onset    Drug abuse Mother     No Known Problems Father     Colon cancer Paternal Grandfather        Social History:   Social History   Substance Use Topics     "Smoking status: Never Smoker    Smokeless tobacco: Never Used    Alcohol use No       Allergies: Reviewed    Home Medications:   Current Outpatient Prescriptions on File Prior to Visit   Medication Sig Dispense Refill    ALPRAZolam (XANAX) 0.5 MG tablet Take ONE-HALF to ONE tablet by mouth at bedtime as needed for anxiety or insomnia 30 tablet 0    pantoprazole (PROTONIX) 40 MG tablet Take 1 tablet (40 mg total) by mouth once daily. 30 tablet 11    sucralfate (CARAFATE) 1 gram tablet Take 1 tablet (1 g total) by mouth 4 (four) times daily before meals and nightly. 60 tablet 1     No current facility-administered medications on file prior to visit.        Physical Exam:  Vital Signs:  /88   Pulse 84   Ht 5' 5" (1.651 m)   Wt 81 kg (178 lb 9.2 oz)   BMI 29.72 kg/m²   Body mass index is 29.72 kg/m².  Physical Exam   Constitutional: He is oriented to person, place, and time. He appears well-developed and well-nourished.   HENT:   Head: Normocephalic.   Eyes: No scleral icterus.   Neck: Normal range of motion.   Cardiovascular: Normal rate and regular rhythm.    Pulmonary/Chest: Effort normal and breath sounds normal.   Abdominal: Soft. Bowel sounds are normal. He exhibits no distension. There is no tenderness.   Musculoskeletal: Normal range of motion.   Neurological: He is alert and oriented to person, place, and time.   Skin: Skin is warm and dry.   Psychiatric: He has a normal mood and affect.   Vitals reviewed.      Labs: Pertinent labs reviewed.    Assessment:  1. Gastroesophageal reflux disease without esophagitis    2. Anxiety disorder due to medical condition    3. Other insomnia         Recommendations:  - GERD with improvement with medication and diet changes  - GERD diet and lifestyle discussed  - Will continue current medications.  Can decrease Carafate to once at bedtime.   - WIll continue treatment for 6 weeks and reevaluate the need for EGD at that time.   - Encouraged continued medication " use for anxiety and insomnia.     Gastroesophageal reflux disease without esophagitis    Anxiety disorder due to medical condition    Other insomnia        Follow-up in about 6 weeks (around 4/23/2018).        Thank you so much for allowing me to participate in the care of GENIA Parker

## 2018-04-05 ENCOUNTER — OFFICE VISIT (OUTPATIENT)
Dept: INTERNAL MEDICINE | Facility: CLINIC | Age: 53
End: 2018-04-05
Payer: COMMERCIAL

## 2018-04-05 VITALS
DIASTOLIC BLOOD PRESSURE: 78 MMHG | HEIGHT: 65 IN | HEART RATE: 77 BPM | BODY MASS INDEX: 29.97 KG/M2 | SYSTOLIC BLOOD PRESSURE: 118 MMHG | TEMPERATURE: 98 F | WEIGHT: 179.88 LBS | OXYGEN SATURATION: 99 %

## 2018-04-05 DIAGNOSIS — E66.3 OVERWEIGHT (BMI 25.0-29.9): ICD-10-CM

## 2018-04-05 DIAGNOSIS — G47.09 OTHER INSOMNIA: ICD-10-CM

## 2018-04-05 DIAGNOSIS — K21.9 GASTROESOPHAGEAL REFLUX DISEASE WITHOUT ESOPHAGITIS: Primary | Chronic | ICD-10-CM

## 2018-04-05 DIAGNOSIS — F06.4 ANXIETY DISORDER DUE TO MEDICAL CONDITION: ICD-10-CM

## 2018-04-05 PROCEDURE — 99999 PR PBB SHADOW E&M-EST. PATIENT-LVL III: CPT | Mod: PBBFAC,,, | Performed by: FAMILY MEDICINE

## 2018-04-05 PROCEDURE — 99214 OFFICE O/P EST MOD 30 MIN: CPT | Mod: S$GLB,,, | Performed by: FAMILY MEDICINE

## 2018-04-05 RX ORDER — ALPRAZOLAM 0.25 MG/1
TABLET ORAL
Qty: 30 TABLET | Refills: 1 | Status: SHIPPED | OUTPATIENT
Start: 2018-04-05 | End: 2019-03-25

## 2018-04-05 NOTE — PATIENT INSTRUCTIONS

## 2018-04-05 NOTE — PROGRESS NOTES
"CHIEF COMPLAINT  Medication Refill      HISTORY OF PRESENT ILLNESS  PROBLEM/CONDITION: Gastroesophageal reflux disease symptoms completely suppressed on pantoprazole each morning and a single dose of sucralfate each evening. He has seen gastroenterology and had further evaluation, and he has follow-up with gastroenterology in a couple of weeks. I instructed him to inquire whether or not they wanted him to continue the sucralfate. If so, he needs to obtain a prescription from them.    PROBLEM/CONDITION: Anxiety is dramatically improved.    PROBLEM/CONDITION: Insomnia is markedly improved, needing/using typically no more than 0.25 mg (one half tablet of 0.5 mg) alprazolam at bedtime. I encouraged him to wean and discontinue that medication as tolerated. He is demonstrating no behaviors to suggest inappropriate use of the medication. He understands that additional refills will be given only at office appointments.    PROBLEM/CONDITION: He is at the upper limits of overweight, almost in class I obesity. He is working on therapeutic lifestyle changes. Therapeutic lifestyle changes encouraged.    No other complaints or concerns reported.      Problem List Items Addressed This Visit     Other insomnia    Relevant Medications    ALPRAZolam (XANAX) 0.25 MG tablet    Anxiety disorder due to medical condition    Relevant Medications    ALPRAZolam (XANAX) 0.25 MG tablet    Overweight (BMI 25.0-29.9)    Gastroesophageal reflux disease without esophagitis - Primary (Chronic)          REVIEW OF SYSTEMS  GASTROINTESTINAL: No hematemesis or melena reported.   PSYCHIATRIC: No mood instability reported.  ENDOCRINE: No polyuria or polydipsia reported.      PHYSICAL EXAM  Vitals:    04/05/18 0946   BP: 118/78   BP Location: Right arm   Patient Position: Sitting   BP Method: Medium (Manual)   Pulse: 77   Temp: 97.6 °F (36.4 °C)   TempSrc: Tympanic   SpO2: 99%   Weight: 81.6 kg (179 lb 14.3 oz)   Height: 5' 5" (1.651 m) "     CONSTITUTIONAL: Vital signs noted. No apparent distress. Does not appear acutely ill or septic. Appears adequately hydrated.  HEENT: External ENT grossly unremarkable. Hearing grossly intact. Oropharynx moist.  PULM: Lungs clear. Breathing unlabored.  HEART: Auscultation reveals regular rate and rhythm without murmur, gallop or rub.  DERM: Skin warm and moist with normal turgor.  NEURO: There are no gross focal motor deficits or gross deficits of cranial nerves III-XII.  PSYCHIATRIC: Alert and oriented x 3. Mood is grossly neutral. Affect appropriate. Judgment and insight not grossly compromised.  MUSCULOSKELETAL: Grossly normal stance and gait.    PAST MEDICAL HISTORY, FAMILY HISTORY, SOCIAL HISTORY, CURRENT MEDICATION LIST, and ALLERGY LIST reviewed by me (GAL Garcia MD) and are updated consistent with the patient's report.    ASSESSMENT and PLAN  Gastroesophageal reflux disease without esophagitis    Anxiety disorder due to medical condition  -     ALPRAZolam (XANAX) 0.25 MG tablet; Take ONE-HALF to ONE tablet by mouth once daily at bedtime AS NEEDED for anxiety or insomnia  Dispense: 30 tablet; Refill: 1    Other insomnia  -     ALPRAZolam (XANAX) 0.25 MG tablet; Take ONE-HALF to ONE tablet by mouth once daily at bedtime AS NEEDED for anxiety or insomnia  Dispense: 30 tablet; Refill: 1    Overweight (BMI 25.0-29.9)        PRESCRIPTION MEDICATION MANAGEMENT  Medication List with Changes/Refills   New Medications    ALPRAZOLAM (XANAX) 0.25 MG TABLET    Take ONE-HALF to ONE tablet by mouth once daily at bedtime AS NEEDED for anxiety or insomnia   Current Medications    PANTOPRAZOLE (PROTONIX) 40 MG TABLET    Take 1 tablet (40 mg total) by mouth once daily.    SUCRALFATE (CARAFATE) 1 GRAM TABLET    Take 1 tablet (1 g total) by mouth 4 (four) times daily before meals and nightly.   Discontinued Medications    ALPRAZOLAM (XANAX) 0.5 MG TABLET    Take ONE-HALF to ONE tablet by mouth at bedtime as needed for  "anxiety or insomnia       Follow-up in about 3 months (around 7/5/2018) for wellness and preventive services.    ABOUT THIS DOCUMENTATION:  · The order of the conditions listed in the HPI is one of convenience and does not necessarily reflect the chronology of the appointment, nor the relative importance of a condition. It is possible that additional description or status details about condition(s) may be found elsewhere in the EHR documentation for today's encounter.  · Documentation entered by me for this encounter was done in part using speech-recognition technology. Although I have made an effort to ensure accuracy, "sound like" errors may exist and should be interpreted in context.                        -GAL Garcia MD    Patient Instructions       Prevention Guidelines, Men Ages 50 to 64  Screening tests and vaccines are an important part of managing your health. Health counseling is essential, too. Below are guidelines for these, for men ages 50 to 64. Talk with your healthcare provider to make sure youre up-to-date on what you need.  Screening Who needs it How often   Alcohol misuse All men in this age group At routine exams   Blood pressure All men in this age group Every 2 years if your blood pressure is less than 120/80 mm Hg; yearly if your systolic blood pressure is 120 to 139 mm Hg, or your diastolic blood pressure reading is 80 to 89 mm Hg   Colorectal cancer All men in this age group Flexible sigmoidoscopy every 5 years, or colonoscopy every 10 years, or double-contrast barium enema every 5 years; yearly fecal occult blood test or fecal immunochemical test; or a stool DNA test as often as your healthcare provider advises; talk with your healthcare provider about which tests are best for you   Depression All men in this age group At routine exams   Type 2 diabetes or prediabetes All adults beginning at age 45 and adults without symptoms at any age who are overweight or obese and have 1 or " more other risk factors for diabetes At least every 3 years (yearly if your blood sugar has already begun to rise)   Hepatitis C Men at increased risk for infection - talk with your healthcare provider At routine exams. All men ages 50 to 70 should be tested at least once for hepatitis C.   High cholesterol or triglycerides All men in this age group At least every 5 years   HIV Men at increased risk for infection - talk with your healthcare provider At routine exams   Lung cancer Adults age 55 to 80 who have smoked Yearly screening in smokers with 30 pack-year history of smoking or who quit within 15 years   Obesity All men in this age group At routine exams   Prostate cancer Starting at age 45, talk to healthcare provider about risks and benefits of digital rectal exam (MADHU) and prostate-specific antigen (PSA) screening1 At routine exams   Syphilis Men at increased risk for infection - talk with your healthcare provider At routine exams   Tuberculosis Men at increased risk for infection - talk with your healthcare provider Ask your healthcare provider   Vision All men in this age group Ask your healthcare provider   Vaccine Who needs it How often   Chickenpox (varicella) All men in this age group who have no record of this infection or vaccine 2 doses; second dose should be given at least 4 weeks after the first dose   Hepatitis A Men at increased risk for infection - talk with your healthcare provider 2 doses given at least 6 months apart   Hepatitis B Men at increased risk for infection - talk with your healthcare provider 3 doses over 6 months; second dose should be given 1 month after the first dose; the third dose should be given at least 2 months after the second dose and at least 4 months after the first dose   Haemophilus influenzae Type B (HIB) Men at increased risk for infection - talk with your healthcare provider 1 to 3 doses   Influenza (flu) All men in this age group Once a year   Measles, mumps,  rubella (MMR) Men in this age group through their late 50s who have no record of these infections or vaccines 1 or 2 doses; ask your healthcare provider   Meningococcal Men at increased risk for infection - talk with your healthcare provider 1 or more doses   Pneumococcal conjugate vaccine (PCV13) and pneumococcal polysaccharide vaccine (PPSV23) Men at increased risk for infection - talk with your healthcare provider PCV13: 1 dose ages 19 to 65 (protects against 13 types of pneumococcal bacteria)     PPSV23: 1 to 2 doses through age 64, or 1 dose at 65 or older (protects against 23 types of pneumococcal bacteria)      Tetanus/diphtheria/  pertussis (Td/Tdap) booster All men in this age group Td every 10 years, or a one-time dose of Tdap instead of a Td booster after age 18, then Td every 10 years   Zoster All men ages 60 and older 1 dose   Counseling Who needs it How often   Diet and exercise Men who are overweight or obese When diagnosed, and then at routine exams   Sexually transmitted infection prevention Men at increased risk for infection - talk with your healthcare provider At routine exams   Use of daily aspirin Men in this age group at risk for cardiovascular health problems At routine exams   Use of tobacco and the health effects it can cause All men in this age group Every visit   [address] [place] [address] [facility]  Date Last Reviewed: 2/1/2017  © 7020-6130 [employer]. [address] [place] [address], [place] [address]. All rights reserved. This information is not intended as a substitute for professional medical care. Always follow your healthcare professional's instructions.

## 2018-04-30 ENCOUNTER — OFFICE VISIT (OUTPATIENT)
Dept: GASTROENTEROLOGY | Facility: CLINIC | Age: 53
End: 2018-04-30
Payer: COMMERCIAL

## 2018-04-30 VITALS
HEIGHT: 65 IN | SYSTOLIC BLOOD PRESSURE: 112 MMHG | HEART RATE: 60 BPM | WEIGHT: 182.56 LBS | BODY MASS INDEX: 30.42 KG/M2 | DIASTOLIC BLOOD PRESSURE: 60 MMHG

## 2018-04-30 DIAGNOSIS — K21.9 GASTROESOPHAGEAL REFLUX DISEASE WITHOUT ESOPHAGITIS: Chronic | ICD-10-CM

## 2018-04-30 DIAGNOSIS — K21.9 GASTROESOPHAGEAL REFLUX DISEASE WITHOUT ESOPHAGITIS: Primary | Chronic | ICD-10-CM

## 2018-04-30 PROCEDURE — 99999 PR PBB SHADOW E&M-EST. PATIENT-LVL III: CPT | Mod: PBBFAC,,, | Performed by: NURSE PRACTITIONER

## 2018-04-30 PROCEDURE — 99214 OFFICE O/P EST MOD 30 MIN: CPT | Mod: S$GLB,,, | Performed by: NURSE PRACTITIONER

## 2018-04-30 NOTE — PROGRESS NOTES
Clinic Follow Up:  [facility] Follow Up Note    Reason for Follow Up:  The encounter diagnosis was Gastroesophageal reflux disease without esophagitis.    PCP: DEBRA Garcia       HPI:  This is a 53 y.o. male here for follow up of the above  He states that since his last visit, he has been feeling significantly better.  He has continued to take the PPI in the morning and the Carafate in the evening.  He denies any GI complaints.  He is sleeping through the night as well.  He has made some dietary adjustments which have also helped with his symptoms.   Denies any abdominal pain.  No nausea or vomiting.  No change in bowel pattern.  No melena or hematochezia. No weight loss.    Review of Systems   Constitutional: Negative for chills, fever, malaise/fatigue and weight loss.   Respiratory: Negative for cough.    Cardiovascular: Negative for chest pain.   Gastrointestinal:        Per HPI   Musculoskeletal: Negative for myalgias.   Skin: Negative for itching and rash.   Neurological: Negative for headaches.   Psychiatric/Behavioral: The patient is not nervous/anxious.        Medical History:  Past Medical History:   Diagnosis Date    Colon polyp     Gastroesophageal reflux disease without esophagitis 2/28/2018       Surgical History:   Past Surgical History:   Procedure Laterality Date    COLONOSCOPY  2015    NOSE SURGERY         Family History:   Family History   Problem Relation Age of Onset    Drug abuse Mother     No Known Problems Father     Colon cancer Paternal Grandfather        Social History:   Social History   Substance Use Topics    Smoking status: Never Smoker    Smokeless tobacco: Never Used    Alcohol use No       Allergies: Reviewed    Home Medications:  Current Outpatient Prescriptions on File Prior to Visit   Medication Sig Dispense Refill    pantoprazole (PROTONIX) 40 MG tablet Take 1 tablet (40 mg total) by mouth once daily. 30 tablet 11    sucralfate (CARAFATE) 1  "gram tablet Take 1 tablet (1 g total) by mouth 4 (four) times daily before meals and nightly. 60 tablet 1    ALPRAZolam (XANAX) 0.25 MG tablet Take ONE-HALF to ONE tablet by mouth once daily at bedtime AS NEEDED for anxiety or insomnia 30 tablet 1     No current facility-administered medications on file prior to visit.        Physical Exam:  Vital Signs:  /60   Pulse 60   Ht 5' 5" (1.651 m)   Wt 82.8 kg (182 lb 8.7 oz)   BMI 30.38 kg/m²   Body mass index is 30.38 kg/m².  Physical Exam   Constitutional: He is oriented to person, place, and time. He appears well-developed.   HENT:   Head: Normocephalic.   Neck: Normal range of motion.   Cardiovascular: Normal rate and regular rhythm.    Pulmonary/Chest: Effort normal and breath sounds normal.   Abdominal: Soft. Bowel sounds are normal. He exhibits no distension. There is no tenderness.   Musculoskeletal: Normal range of motion.   Neurological: He is alert and oriented to person, place, and time.   Skin: Skin is warm and dry.   Psychiatric: He has a normal mood and affect.   Vitals reviewed.      Labs: Pertinent labs reviewed.      Assessment:  1. Gastroesophageal reflux disease without esophagitis        Recommendations:  Gastroesophageal reflux disease without esophagitis  - Improved  - Stop carafate  - Continue PPI for an additional 4 weeks, then can stop.  If symptom return, can resume medication.  At that time, EGD may be needed for further investigation  - Continue with GERD diet and lifestyle changes.            Return to Clinic:    Follow-up if symptoms worsen or fail to improve.      "

## 2018-05-01 RX ORDER — PANTOPRAZOLE SODIUM 40 MG/1
40 TABLET, DELAYED RELEASE ORAL DAILY
Qty: 90 TABLET | Refills: 3 | Status: SHIPPED | OUTPATIENT
Start: 2018-05-01 | End: 2019-03-25

## 2019-03-25 ENCOUNTER — OFFICE VISIT (OUTPATIENT)
Dept: INTERNAL MEDICINE | Facility: CLINIC | Age: 54
End: 2019-03-25
Payer: COMMERCIAL

## 2019-03-25 VITALS
HEIGHT: 65 IN | TEMPERATURE: 99 F | DIASTOLIC BLOOD PRESSURE: 78 MMHG | SYSTOLIC BLOOD PRESSURE: 122 MMHG | WEIGHT: 186.31 LBS | BODY MASS INDEX: 31.04 KG/M2 | HEART RATE: 78 BPM | OXYGEN SATURATION: 98 %

## 2019-03-25 DIAGNOSIS — E66.09 CLASS 1 OBESITY DUE TO EXCESS CALORIES WITHOUT SERIOUS COMORBIDITY WITH BODY MASS INDEX (BMI) OF 31.0 TO 31.9 IN ADULT: ICD-10-CM

## 2019-03-25 DIAGNOSIS — Z13.1 SCREENING FOR DIABETES MELLITUS: ICD-10-CM

## 2019-03-25 DIAGNOSIS — Z00.00 PREVENTATIVE HEALTH CARE: Primary | ICD-10-CM

## 2019-03-25 DIAGNOSIS — Z23 NEED FOR INFLUENZA VACCINATION: ICD-10-CM

## 2019-03-25 DIAGNOSIS — Z12.5 SCREENING PSA (PROSTATE SPECIFIC ANTIGEN): ICD-10-CM

## 2019-03-25 DIAGNOSIS — K21.9 GASTROESOPHAGEAL REFLUX DISEASE WITHOUT ESOPHAGITIS: Chronic | ICD-10-CM

## 2019-03-25 DIAGNOSIS — Z13.220 SCREENING FOR LIPID DISORDERS: ICD-10-CM

## 2019-03-25 PROBLEM — F06.4 ANXIETY DISORDER DUE TO MEDICAL CONDITION: Status: RESOLVED | Noted: 2018-02-28 | Resolved: 2019-03-25

## 2019-03-25 PROBLEM — E66.811 CLASS 1 OBESITY DUE TO EXCESS CALORIES WITHOUT SERIOUS COMORBIDITY WITH BODY MASS INDEX (BMI) OF 31.0 TO 31.9 IN ADULT: Status: ACTIVE | Noted: 2018-04-05

## 2019-03-25 PROBLEM — G47.09 OTHER INSOMNIA: Status: RESOLVED | Noted: 2018-02-28 | Resolved: 2019-03-25

## 2019-03-25 PROCEDURE — 99999 PR PBB SHADOW E&M-EST. PATIENT-LVL III: CPT | Mod: PBBFAC,,, | Performed by: FAMILY MEDICINE

## 2019-03-25 PROCEDURE — 90686 FLU VACCINE (QUAD) GREATER THAN OR EQUAL TO 3YO PRESERVATIVE FREE IM: ICD-10-PCS | Mod: S$GLB,,, | Performed by: FAMILY MEDICINE

## 2019-03-25 PROCEDURE — 99999 PR PBB SHADOW E&M-EST. PATIENT-LVL III: ICD-10-PCS | Mod: PBBFAC,,, | Performed by: FAMILY MEDICINE

## 2019-03-25 PROCEDURE — 90471 FLU VACCINE (QUAD) GREATER THAN OR EQUAL TO 3YO PRESERVATIVE FREE IM: ICD-10-PCS | Mod: S$GLB,,, | Performed by: FAMILY MEDICINE

## 2019-03-25 PROCEDURE — 99396 PREV VISIT EST AGE 40-64: CPT | Mod: 25,S$GLB,, | Performed by: FAMILY MEDICINE

## 2019-03-25 PROCEDURE — 90686 IIV4 VACC NO PRSV 0.5 ML IM: CPT | Mod: S$GLB,,, | Performed by: FAMILY MEDICINE

## 2019-03-25 PROCEDURE — 90471 IMMUNIZATION ADMIN: CPT | Mod: S$GLB,,, | Performed by: FAMILY MEDICINE

## 2019-03-25 PROCEDURE — 99396 PR PREVENTIVE VISIT,EST,40-64: ICD-10-PCS | Mod: 25,S$GLB,, | Performed by: FAMILY MEDICINE

## 2019-03-25 NOTE — PATIENT INSTRUCTIONS
If you need help with MyChart and [facility], help is available:  · online at [email or web address]  at the O-bar in the 1st floor lobby of [facility]  · or by phone at [phone]  · by email at  [email or web address]

## 2019-03-25 NOTE — ASSESSMENT & PLAN NOTE
Wt Readings from Last 3 Encounters:   03/25/19 84.5 kg (186 lb 4.6 oz)   04/30/18 82.8 kg (182 lb 8.7 oz)   04/05/18 81.6 kg (179 lb 14.3 oz)   This condition appears to be MODESTLY WORSE.  Therapeutic lifestyle changes encouraged.

## 2019-03-25 NOTE — PROGRESS NOTES
CHIEF COMPLAINT  Annual Exam      HISTORY OF PRESENT ILLNESS (PREVENTIVE SERVICES)    HEALTH MAINTENANCE INTERVENTIONS - UP TO DATE  Health Maintenance Topics with due status: Not Due       Topic Last Completion Date    Colonoscopy 01/07/2015    Lipid Panel 03/26/2019       HEALTH MAINTENANCE INTERVENTIONS - DUE OR DUE SOON  There are no preventive care reminders to display for this patient.    · HYPERTENSION SCREENING: negative  BP Readings from Last 3 Encounters:   03/25/19 122/78   04/30/18 112/60   04/05/18 118/78        · OBESITY SCREENING: POSITIVE (appropirate counseling provided)  Wt Readings from Last 3 Encounters:   03/25/19 84.5 kg (186 lb 4.6 oz)   04/30/18 82.8 kg (182 lb 8.7 oz)   04/05/18 81.6 kg (179 lb 14.3 oz)     BMI Readings from Last 3 Encounters:   03/25/19 31.00 kg/m²   04/30/18 30.38 kg/m²   04/05/18 29.94 kg/m²        · DYSLIPIDEMIA SCREENING: ordered  Lab Results   Component Value Date    CHOL 158 03/26/2019    TRIG 156 (H) 03/26/2019    HDL 39 (L) 03/26/2019    LDLCALC 88 03/26/2019    NONHDLCHOL 118 05/22/2017       · DIABETES SCREENING: ordered  Lab Results   Component Value Date    HGBA1C 5.7 (H) 04/09/2019    HGBA1C 5.8 05/22/2017     (H) 03/26/2019        · HIV SCREENING: offered, he declined    No results found for: [id number]       · SEXUALLY TRANSMITTED INFECTION SCREENING: offered, he declined  No results found for: LABZUNILDAA, RPR      · TOBACCO USE SCREENING: negative  He  reports that he has never smoked. He has never used smokeless tobacco.    · ALCOHOL MISUSE SCREENING: negative  He  reports that he does not drink alcohol.    · DEPRESSION SCREENING: negative    · IMMUNIZATIONS: reported as up to date according to current CDC guidelines.    · VISION SCREENING: current - not needed at this time    · ASPIRIN: not indicated    · ABDOMINAL AORTIC ANEURYSM SCREENING: not indicated    · COLORECTAL CANCER SCREENING: current - not needed at this time    · PROSTATE CANCER SCREENING:  ordered  Lab Results   Component Value Date    PSA 1.9 05/22/2017        Problem List Items Addressed This Visit        Endocrine    Class 1 obesity due to excess calories without serious comorbidity with body mass index (BMI) of 31.0 to 31.9 in adult    Current Assessment & Plan     Wt Readings from Last 3 Encounters:   03/25/19 84.5 kg (186 lb 4.6 oz)   04/30/18 82.8 kg (182 lb 8.7 oz)   04/05/18 81.6 kg (179 lb 14.3 oz)   This condition appears to be MODESTLY WORSE.  Therapeutic lifestyle changes encouraged.             GI    RESOLVED: Gastroesophageal reflux disease without esophagitis (Chronic)    Current Assessment & Plan     This condition appears to be RESOLVED. He reports no residual symptoms or impairment.            Other Visit Diagnoses     Preventative health care    -  Primary    Relevant Orders    Influenza - Quadrivalent (3 years & older) (PF) (Completed)    PSA, Screening (Completed)    Glucose, random (Completed)    Lipid panel (Completed)    Need for influenza vaccination        Relevant Orders    Influenza - Quadrivalent (3 years & older) (PF) (Completed)    Screening for lipid disorders        Relevant Orders    Lipid panel (Completed)    Screening for diabetes mellitus        Relevant Orders    Glucose, random (Completed)    Screening PSA (prostate specific antigen)        Relevant Orders    PSA, Screening (Completed)          REVIEW OF SYSTEMS  CONSTITUTIONAL: No fever reported.  CARDIOVASCULAR: No angina reported.  PULMONARY: No hemoptysis reported.  PSYCHIATRIC: No mood instability reported.  NEUROLOGIC: No seizures reported.  ENDOCRINE: No polydipsia reported.  GASTROINTESTINAL: No blood in stool reported.  GENITOURINARY: No hematuria reported.  ENT: No acute hearing changes reported.  OPHTHALMOLOGIC: No acute vision changes reported.  HEMATOLOGIC: No bleeding problems reported.  MUSCULOSKELETAL: No recent injuries reported.  DERMATOLOGIC: No rash reported.  REMAINDER OF COMPLETE REVIEW OF  "SYSTEMS is negative or noncontributory to present illness except as noted herein.     PHYSICAL EXAM  Vitals:    03/25/19 1643   BP: 122/78   BP Location: Right arm   Patient Position: Sitting   Pulse: 78   Temp: 99 °F (37.2 °C)   TempSrc: Tympanic   SpO2: 98%   Weight: 84.5 kg (186 lb 4.6 oz)   Height: 5' 5" (1.651 m)     CONSTITUTIONAL: Vital signs noted. No apparent distress. Does not appear acutely ill or septic. Appears adequately hydrated.  EYE: Sclerae anicteric. Lids and conjunctiva unremarkable.  ENT: External ENT unremarkable. Oropharynx moist.  NECK: Trachea midline. Thyroid nontender.  PULM: Lungs clear. Breathing unlabored.  CV: Auscultation reveals regular rate and rhythm without murmur, gallop or rub. No carotid bruit.  GI: Soft and nontender. Bowel sounds normal.  DERM: Skin warm and moist with normal turgor.  PSYCH: Alert and oriented x 3. Mood is grossly neutral. Affect appropriate. Judgment and insight not grossly compromised.    Follow up in about 1 year (around 3/25/2020) for wellness and preventive services.    ABOUT THIS DOCUMENTATION:  · The order of the conditions listed in the HPI is one of convenience and does not necessarily reflect the chronology of the appointment, nor the relative importance of a condition. It is possible that additional description or status details about condition(s) may be found elsewhere in the documentation for today's encounter.  · Documentation entered by me for this encounter was done in part using speech-recognition technology. Although I have made an effort to ensure accuracy, malapropisms may exist and should be interpreted in context.                        JAN Garcia MD    Patient Instructions   If you need help with [facility] and [facility], help is available:  · online at [email or web address]  at the O-bar in the 1st floor lobby of [facility]  · or by phone at [phone]  · by email at  [email or web address]       "

## 2019-03-27 LAB
CHOLEST SERPL-MCNC: 158 MG/DL (ref 100–199)
GLUCOSE SERPL-MCNC: 135 MG/DL (ref 65–99)
HDLC SERPL-MCNC: 39 MG/DL
LDLC SERPL CALC-MCNC: 88 MG/DL (ref 0–99)
PSA SERPL-MCNC: 3 NG/ML (ref 0–4)
TRIGL SERPL-MCNC: 156 MG/DL (ref 0–149)
VLDLC SERPL CALC-MCNC: 31 MG/DL (ref 5–40)

## 2019-04-04 DIAGNOSIS — R73.09 ABNORMAL GLUCOSE: Primary | ICD-10-CM

## 2019-04-04 NOTE — PROGRESS NOTES
TASK: Please read Patient Portal Message (below), and then contact him to verify his receipt and understanding.  If the message references any orders, instructions, or follow-up appointments, please coordinate those. Thanks.  --------------------------------------------------------------------------------  Your glucose (blood sugar) level is elevated.  This can be a sign of early diabetes.  I have ordered an additional lab test to evaluate your risk for diabetes. Someone from [facility] will be contacting you soon to help you schedule this.    Your PSA is normal.  It is higher than it was two years ago, but it is still normal.  I recommend rechecking your PSA in one year.    Your lipid (cholesterol) panel shows that your cholesterol levels are NOT GREAT, but not bad enough to start a medicine at this point. For now, I recommend working on a heart-healthy lifestyle. You can learn more about a heart-healthy lifestyle at the website of the American Heart Association, www.americanheart.org.  --------------------------------------------------------------------------------    Home Phone      [phone]  Work Phone      Not on file.  Mobile          [phone]      No future appointments.

## 2019-04-05 ENCOUNTER — PATIENT MESSAGE (OUTPATIENT)
Dept: INTERNAL MEDICINE | Facility: CLINIC | Age: 54
End: 2019-04-05

## 2019-04-08 ENCOUNTER — TELEPHONE (OUTPATIENT)
Dept: INTERNAL MEDICINE | Facility: CLINIC | Age: 54
End: 2019-04-08

## 2019-04-08 DIAGNOSIS — R73.09 ABNORMAL GLUCOSE: Primary | ICD-10-CM

## 2019-04-08 NOTE — TELEPHONE ENCOUNTER
----- Message from Vanessa De La Paz sent at 4/8/2019  4:24 PM CDT -----  Contact: patient  Calling concerning if lab orders were sent to [facility] for patient. Please call patient @ [phone]. Thanks, tal

## 2019-04-08 NOTE — TELEPHONE ENCOUNTER
----- Message from Shonda Ritter sent at 4/8/2019 12:03 PM CDT -----  Contact: pt  Pt states that he need orders sent to [facility].     [phone] (home)

## 2019-04-08 NOTE — TELEPHONE ENCOUNTER
Spoke with patient and informed him that Dr. Garcia sent the lab order to [facility] for him. He verbalized understanding.

## 2019-04-10 LAB — HBA1C MFR BLD: 5.7 % (ref 4.8–5.6)

## 2019-04-12 PROBLEM — R73.03 PRE-DIABETES: Status: ACTIVE | Noted: 2019-04-12

## 2019-04-12 NOTE — TELEPHONE ENCOUNTER
"Vinny Negrete.    Your A1c is 5.7%, which is mildly elevated, consistent with pre-diabetes.  It is marginally improved from 5.8% last year.    Pre-diabetes means that your blood sugar levels are higher than normal but not yet high enough to be called diabetes. People with pre-diabetes are likely to progress to diabetes unless they make some healthy lifestyle changes.    The best treatments for pre-diabetes are (1) healthy physical activity, and (2) healthy eating habits - the same kind of healthy eating that would be good for someone with diabetes. For example, lots of healthy, colorful vegetables and lean sources of protein.    Please take time to learn about healthy eating habits to prevent diabetes at the website of the American Diabetes Association, www.diabetes.org. Sometimes people have wrong ideas about healthy eating to prevent diabetes. For example, most people consider oatmeal and orange juice as "healthy." However, they both have lots of carbohydrates, which is NOT good for someone trying to prevent or treat diabetes.    Certain factors increase the risk of developing pre-diabetes and diabetes, including:  *WEIGHT: Being overweight is a primary risk factor for pre-diabetes. The more fatty tissue you have - especially inside and between the muscle and skin around your abdomen - the more resistant your cells become to insulin.  *WAIST SIZE: A large waist size can indicate insulin resistance. The risk of insulin resistance goes up for men with waists larger than 40 inches and for women with waists larger than 35 inches.  *DIETARY PATTERNS: Eating red meat and processed meat, and drinking sugar-sweetened beverages, is associated with a higher risk of pre-diabetes. A diet high in fruits, vegetables, nuts, whole grains and olive oil is associated with a lower risk of pre-diabetes.  *INACTIVITY: The less active you are, the greater your risk of pre-diabetes. Physical activity helps you control your weight, uses up " "glucose as energy and makes your cells more sensitive to insulin.  *AGE: Although diabetes can develop at any age, the risk of pre-diabetes increases after age 45. This may be because people tend to exercise less, lose muscle mass and gain weight as they age.  *FAMILY HISTORY: Your risk of pre-diabetes increases if you have a parent or sibling with type 2 diabetes.  *RACE: Although it's unclear why, people of certain races - including -Americans, Hispanics, Native Americans, -Americans and Pacific Islanders - are more likely to develop pre-diabetes.  *SLEEP: People with a certain sleep disorder (obstructive sleep apnea) have an increased risk of insulin resistance. People who work changing shifts or night shifts, possibly causing sleep problems, also may have an increased risk of pre-diabetes or type 2 diabetes.    When you return for your next appointment, we can talk more about pre-diabetes and what can be done to lower your risk for developing diabetes and heart disease and stroke. In the meantime: (1) Eat healthy foods. (2) Get more physical activity. (3) Work on losing excess fat.     Thank you for letting me care for you. I look forward to seeing you at your next appointment.    Sincerely,    GAL Garcia MD    P.S. - Want to learn more about your test results and what they mean? It's as simple as 1, 2, 3.     (1) Log in to your [facility] account at [email or web address]     (2) From the "View test results" tab, click on the test you want to know more about.     (3) Click on the "About This Test" link."

## 2019-10-22 ENCOUNTER — OFFICE VISIT (OUTPATIENT)
Dept: INTERNAL MEDICINE | Facility: CLINIC | Age: 54
End: 2019-10-22
Payer: COMMERCIAL

## 2019-10-22 VITALS
BODY MASS INDEX: 30.81 KG/M2 | HEIGHT: 65 IN | DIASTOLIC BLOOD PRESSURE: 68 MMHG | OXYGEN SATURATION: 98 % | SYSTOLIC BLOOD PRESSURE: 102 MMHG | TEMPERATURE: 98 F | HEART RATE: 57 BPM | WEIGHT: 184.94 LBS

## 2019-10-22 DIAGNOSIS — E66.09 CLASS 1 OBESITY DUE TO EXCESS CALORIES WITHOUT SERIOUS COMORBIDITY WITH BODY MASS INDEX (BMI) OF 30.0 TO 30.9 IN ADULT: Chronic | ICD-10-CM

## 2019-10-22 DIAGNOSIS — R21 RASH: Primary | ICD-10-CM

## 2019-10-22 PROBLEM — E66.811 CLASS 1 OBESITY DUE TO EXCESS CALORIES WITHOUT SERIOUS COMORBIDITY WITH BODY MASS INDEX (BMI) OF 30.0 TO 30.9 IN ADULT: Chronic | Status: ACTIVE | Noted: 2018-04-05

## 2019-10-22 PROCEDURE — 90686 FLU VACCINE (QUAD) GREATER THAN OR EQUAL TO 3YO PRESERVATIVE FREE IM: ICD-10-PCS | Mod: S$GLB,,, | Performed by: FAMILY MEDICINE

## 2019-10-22 PROCEDURE — 99214 PR OFFICE/OUTPT VISIT, EST, LEVL IV, 30-39 MIN: ICD-10-PCS | Mod: 25,S$GLB,, | Performed by: FAMILY MEDICINE

## 2019-10-22 PROCEDURE — 90686 IIV4 VACC NO PRSV 0.5 ML IM: CPT | Mod: S$GLB,,, | Performed by: FAMILY MEDICINE

## 2019-10-22 PROCEDURE — 90471 FLU VACCINE (QUAD) GREATER THAN OR EQUAL TO 3YO PRESERVATIVE FREE IM: ICD-10-PCS | Mod: S$GLB,,, | Performed by: FAMILY MEDICINE

## 2019-10-22 PROCEDURE — 90471 IMMUNIZATION ADMIN: CPT | Mod: S$GLB,,, | Performed by: FAMILY MEDICINE

## 2019-10-22 PROCEDURE — 99214 OFFICE O/P EST MOD 30 MIN: CPT | Mod: 25,S$GLB,, | Performed by: FAMILY MEDICINE

## 2019-10-22 PROCEDURE — 99999 PR PBB SHADOW E&M-EST. PATIENT-LVL III: ICD-10-PCS | Mod: PBBFAC,,, | Performed by: FAMILY MEDICINE

## 2019-10-22 PROCEDURE — 99999 PR PBB SHADOW E&M-EST. PATIENT-LVL III: CPT | Mod: PBBFAC,,, | Performed by: FAMILY MEDICINE

## 2019-10-22 RX ORDER — KETOCONAZOLE 20 MG/G
CREAM TOPICAL 2 TIMES DAILY
Qty: 60 G | Refills: 0 | Status: SHIPPED | OUTPATIENT
Start: 2019-10-22 | End: 2020-02-21

## 2019-10-22 NOTE — PROGRESS NOTES
CHIEF COMPLAINT  Rash      HISTORY, ASSESSMENT, and PLAN    1. Rash        ASSESSMENT:  Onset reported as about a month ago.  Quality described as pruritic.  Location reported as left chest (see clinical photo).  Severity described as moderate.  Alleviating factors include over-the-counter hydrocortisone.  I explained I was uncertain of the cause of his rash.  We discussed differential diagnosis.  It was agreed to begin a trial of therapy with topical antifungal, and he would see Dermatology for second opinion.     2. Class 1 obesity due to excess calories without serious comorbidity with body mass index (BMI) of 30.0 to 30.9 in adult        ASSESSMENT: Improved with therapeutic lifestyle changes.         Orders Placed This Encounter    Influenza - Quadrivalent (PF)    Ambulatory Referral to Dermatology    ketoconazole (NIZORAL) 2 % cream       Problem List Items Addressed This Visit        Endocrine    Class 1 obesity due to excess calories without serious comorbidity with body mass index (BMI) of 30.0 to 30.9 in adult (Chronic)    Current Assessment & Plan     Wt Readings from Last 6 Encounters:   10/22/19 83.9 kg (184 lb 15.5 oz)   03/25/19 84.5 kg (186 lb 4.6 oz)   04/30/18 82.8 kg (182 lb 8.7 oz)   04/05/18 81.6 kg (179 lb 14.3 oz)   03/12/18 81 kg (178 lb 9.2 oz)   02/28/18 81.3 kg (179 lb 3.7 oz)     BMI Readings from Last 2 Encounters:   10/22/19 30.78 kg/m²   03/25/19 31.00 kg/m²   Improved with therapeutic lifestyle changes.           Other Visit Diagnoses     Rash    -  Primary    Relevant Medications    ketoconazole (NIZORAL) 2 % cream    Other Relevant Orders    Ambulatory Referral to Dermatology           No outpatient medications prior to visit.     No facility-administered medications prior to visit.         Medications Ordered This Encounter   Medications    ketoconazole (NIZORAL) 2 % cream     Sig: Apply topically 2 (two) times daily. for 21 days     Dispense:  60 g     Refill:  0       There  "are no discontinued medications.     Follow up if symptoms worsen or fail to improve.    REVIEW OF SYSTEMS  CONSTITUTIONAL: No fever or chills reported.   DERMATOLOGIC: Rash as above. No other rash, blisters or ulcerations reported.     PHYSICAL EXAM  Vitals:    10/22/19 1442   BP: 102/68   BP Location: Left arm   Patient Position: Sitting   BP Method: Large (Manual)   Pulse: (!) 57   Temp: 97.7 °F (36.5 °C)   TempSrc: Tympanic   SpO2: 98%   Weight: 83.9 kg (184 lb 15.5 oz)   Height: 5' 5" (1.651 m)     CONSTITUTIONAL: Vital signs noted. No apparent distress. Does not appear acutely ill or septic. Appears adequately hydrated.  ENT: Oropharynx moist.  PULM: Breathing unlabored.  CV: Heart regular.  DERM: Skin normothermic. See accompanying clinical photo.   PSYCHIATRIC: Alert and conversant. Grossly oriented. Mood is grossly neutral. Affect appropriate. Judgment and insight not grossly compromised.        Documentation entered by me for this encounter may have been done in part using speech-recognition technology. Although I have made an effort to ensure accuracy, "sound like" errors may exist and should be interpreted in context. -GAL Garcia MD.   "

## 2019-10-22 NOTE — ASSESSMENT & PLAN NOTE
Wt Readings from Last 6 Encounters:   10/22/19 83.9 kg (184 lb 15.5 oz)   03/25/19 84.5 kg (186 lb 4.6 oz)   04/30/18 82.8 kg (182 lb 8.7 oz)   04/05/18 81.6 kg (179 lb 14.3 oz)   03/12/18 81 kg (178 lb 9.2 oz)   02/28/18 81.3 kg (179 lb 3.7 oz)     BMI Readings from Last 2 Encounters:   10/22/19 30.78 kg/m²   03/25/19 31.00 kg/m²   Improved with therapeutic lifestyle changes.

## 2019-12-03 ENCOUNTER — OFFICE VISIT (OUTPATIENT)
Dept: DERMATOLOGY | Facility: CLINIC | Age: 54
End: 2019-12-03
Payer: COMMERCIAL

## 2019-12-03 DIAGNOSIS — L30.9 DERMATITIS: Primary | ICD-10-CM

## 2019-12-03 PROCEDURE — 99202 PR OFFICE/OUTPT VISIT, NEW, LEVL II, 15-29 MIN: ICD-10-PCS | Mod: S$GLB,,, | Performed by: STUDENT IN AN ORGANIZED HEALTH CARE EDUCATION/TRAINING PROGRAM

## 2019-12-03 PROCEDURE — 99202 OFFICE O/P NEW SF 15 MIN: CPT | Mod: S$GLB,,, | Performed by: STUDENT IN AN ORGANIZED HEALTH CARE EDUCATION/TRAINING PROGRAM

## 2019-12-03 PROCEDURE — 99999 PR PBB SHADOW E&M-EST. PATIENT-LVL III: CPT | Mod: PBBFAC,,, | Performed by: STUDENT IN AN ORGANIZED HEALTH CARE EDUCATION/TRAINING PROGRAM

## 2019-12-03 PROCEDURE — 99999 PR PBB SHADOW E&M-EST. PATIENT-LVL III: ICD-10-PCS | Mod: PBBFAC,,, | Performed by: STUDENT IN AN ORGANIZED HEALTH CARE EDUCATION/TRAINING PROGRAM

## 2019-12-03 RX ORDER — BETAMETHASONE VALERATE 1.2 MG/G
CREAM TOPICAL 2 TIMES DAILY
Qty: 45 G | Refills: 1 | Status: SHIPPED | OUTPATIENT
Start: 2019-12-03 | End: 2020-02-21

## 2019-12-03 RX ORDER — BETAMETHASONE VALERATE 1.2 MG/G
CREAM TOPICAL 2 TIMES DAILY
Qty: 45 G | Refills: 1 | Status: SHIPPED | OUTPATIENT
Start: 2019-12-03 | End: 2019-12-03 | Stop reason: SDUPTHER

## 2019-12-03 NOTE — PROGRESS NOTES
Subjective:       Patient ID:  Vinny Stover is a 54 y.o. male who presents for   Chief Complaint   Patient presents with    Rash     History of Present Illness: The patient presents with chief complaint of a rash.  Location: chest  Duration: months  Signs/Symptoms: redness, itching, bumps. No blisters, pain, or scabs  Prior treatments: ketoconazole cream, OTC creams.         Review of Systems   Constitutional: Negative for fever and chills.   Musculoskeletal: Negative for myalgias.        Objective:    Physical Exam   Constitutional: He appears well-developed and well-nourished. No distress.   Neurological: He is alert and oriented to person, place, and time. He is not disoriented.   Psychiatric: He has a normal mood and affect.   Skin:   Areas Examined (abnormalities noted in diagram):   Head / Face Inspection Performed  Neck Inspection Performed  Chest / Axilla Inspection Performed  Abdomen Inspection Performed  RUE Inspected  LUE Inspection Performed              Diagram Legend     Erythematous scaling macule/papule c/w actinic keratosis       Vascular papule c/w angioma      Pigmented verrucoid papule/plaque c/w seborrheic keratosis      Yellow umbilicated papule c/w sebaceous hyperplasia      Irregularly shaped tan macule c/w lentigo     1-2 mm smooth white papules consistent with Milia      Movable subcutaneous cyst with punctum c/w epidermal inclusion cyst      Subcutaneous movable cyst c/w pilar cyst      Firm pink to brown papule c/w dermatofibroma      Pedunculated fleshy papule(s) c/w skin tag(s)      Evenly pigmented macule c/w junctional nevus     Mildly variegated pigmented, slightly irregular-bordered macule c/w mildly atypical nevus      Flesh colored to evenly pigmented papule c/w intradermal nevus       Pink pearly papule/plaque c/w basal cell carcinoma      Erythematous hyperkeratotic cursted plaque c/w SCC      Surgical scar with no sign of skin cancer recurrence      Open and closed  comedones      Inflammatory papules and pustules      Verrucoid papule consistent consistent with wart     Erythematous eczematous patches and plaques     Dystrophic onycholytic nail with subungual debris c/w onychomycosis     Umbilicated papule    Erythematous-base heme-crusted tan verrucoid plaque consistent with inflamed seborrheic keratosis     Erythematous Silvery Scaling Plaque c/w Psoriasis     See annotation      Assessment / Plan:        Dermatitis  -     betamethasone valerate 0.1% (VALISONE) 0.1 % Crea; Apply topically 2 (two) times daily.  Dispense: 45 g; Refill: 1  -     Counseled patient on gentle skin care regimen, including need for sensitive soaps/detergents, as well as need for frequent use of sensitize moisturizers.        Follow up in about 6 weeks (around 1/14/2020).

## 2019-12-06 ENCOUNTER — OFFICE VISIT (OUTPATIENT)
Dept: INTERNAL MEDICINE | Facility: CLINIC | Age: 54
End: 2019-12-06
Payer: COMMERCIAL

## 2019-12-06 VITALS
TEMPERATURE: 98 F | BODY MASS INDEX: 30.81 KG/M2 | WEIGHT: 184.94 LBS | HEIGHT: 65 IN | OXYGEN SATURATION: 95 % | DIASTOLIC BLOOD PRESSURE: 82 MMHG | SYSTOLIC BLOOD PRESSURE: 128 MMHG | HEART RATE: 81 BPM

## 2019-12-06 DIAGNOSIS — J06.9 VIRAL URI WITH COUGH: Primary | ICD-10-CM

## 2019-12-06 LAB
INFLUENZA A, MOLECULAR: NEGATIVE
INFLUENZA B, MOLECULAR: NEGATIVE
SPECIMEN SOURCE: NORMAL

## 2019-12-06 PROCEDURE — 99999 PR PBB SHADOW E&M-EST. PATIENT-LVL IV: CPT | Mod: PBBFAC,,, | Performed by: PHYSICIAN ASSISTANT

## 2019-12-06 PROCEDURE — 99214 PR OFFICE/OUTPT VISIT, EST, LEVL IV, 30-39 MIN: ICD-10-PCS | Mod: S$GLB,,, | Performed by: PHYSICIAN ASSISTANT

## 2019-12-06 PROCEDURE — 87502 INFLUENZA DNA AMP PROBE: CPT

## 2019-12-06 PROCEDURE — 99214 OFFICE O/P EST MOD 30 MIN: CPT | Mod: S$GLB,,, | Performed by: PHYSICIAN ASSISTANT

## 2019-12-06 PROCEDURE — 99999 PR PBB SHADOW E&M-EST. PATIENT-LVL IV: ICD-10-PCS | Mod: PBBFAC,,, | Performed by: PHYSICIAN ASSISTANT

## 2019-12-06 RX ORDER — PROMETHAZINE HYDROCHLORIDE AND DEXTROMETHORPHAN HYDROBROMIDE 6.25; 15 MG/5ML; MG/5ML
5 SYRUP ORAL
COMMUNITY
End: 2019-12-10 | Stop reason: ALTCHOICE

## 2019-12-06 RX ORDER — PREDNISONE 20 MG/1
20 TABLET ORAL DAILY
COMMUNITY
End: 2019-12-10 | Stop reason: ALTCHOICE

## 2019-12-06 NOTE — PROGRESS NOTES
Subjective:      Patient ID: Vinny Stover is a 54 y.o. male.    Chief Complaint: Cough; Headache; Nasal Congestion; Chest Pain; and Sore Throat    URI    This is a new problem. Episode onset: 2 days. Associated symptoms include chest pain, congestion, coughing, headaches, rhinorrhea, sinus pain and a sore throat. Pertinent negatives include no abdominal pain, diarrhea, dysuria, ear pain, joint pain, joint swelling, nausea, neck pain, plugged ear sensation, rash, sneezing, swollen glands, vomiting or wheezing. Treatments tried: steroids, promethazine DM, advil, mucienx. The treatment provided no relief.       Review of Systems   Constitutional: Negative for activity change, appetite change, chills, diaphoresis, fatigue, fever and unexpected weight change.   HENT: Positive for congestion, postnasal drip, rhinorrhea, sinus pressure, sinus pain and sore throat. Negative for ear pain, hearing loss, sneezing, trouble swallowing and voice change.    Eyes: Negative.  Negative for visual disturbance.   Respiratory: Positive for cough. Negative for choking, chest tightness, shortness of breath and wheezing.    Cardiovascular: Positive for chest pain. Negative for palpitations and leg swelling.   Gastrointestinal: Negative for abdominal distention, abdominal pain, blood in stool, constipation, diarrhea, nausea and vomiting.   Endocrine: Negative for cold intolerance, heat intolerance, polydipsia and polyuria.   Genitourinary: Negative.  Negative for difficulty urinating, dysuria and frequency.   Musculoskeletal: Negative for arthralgias, back pain, gait problem, joint pain, joint swelling, myalgias and neck pain.   Skin: Negative for color change, pallor, rash and wound.   Neurological: Positive for headaches. Negative for dizziness, tremors, weakness, light-headedness and numbness.   Hematological: Negative for adenopathy.   Psychiatric/Behavioral: Negative for behavioral problems, confusion, self-injury, sleep  "disturbance and suicidal ideas. The patient is not nervous/anxious.      Objective:   /82 (BP Location: Right arm, Patient Position: Sitting, BP Method: Medium (Manual))   Pulse 81   Temp 98.2 °F (36.8 °C) (Tympanic)   Ht 5' 5" (1.651 m)   Wt 83.9 kg (184 lb 15.5 oz)   SpO2 95%   BMI 30.78 kg/m²     Physical Exam   Constitutional: He is oriented to person, place, and time. He appears well-developed and well-nourished.   HENT:   Head: Normocephalic and atraumatic.   Right Ear: Hearing, tympanic membrane, external ear and ear canal normal. No tenderness.   Left Ear: Hearing, tympanic membrane, external ear and ear canal normal. No tenderness.   Nose: Mucosal edema and rhinorrhea present. No sinus tenderness. Right sinus exhibits maxillary sinus tenderness and frontal sinus tenderness. Left sinus exhibits maxillary sinus tenderness and frontal sinus tenderness.   Mouth/Throat: Uvula is midline and mucous membranes are normal. No oral lesions. Normal dentition. No dental abscesses, uvula swelling or dental caries. Oropharyngeal exudate and posterior oropharyngeal erythema present. No posterior oropharyngeal edema or tonsillar abscesses. No tonsillar exudate.   Eyes: Pupils are equal, round, and reactive to light. Conjunctivae and EOM are normal. Right eye exhibits no discharge. Left eye exhibits no discharge.   Neck: Normal range of motion. Neck supple.   Cardiovascular: Normal rate, regular rhythm and normal heart sounds. Exam reveals no gallop and no friction rub.   No murmur heard.  Pulmonary/Chest: Effort normal and breath sounds normal. No respiratory distress. He has no wheezes. He has no rales.   Lymphadenopathy:     He has no cervical adenopathy.   Neurological: He is alert and oriented to person, place, and time.   Skin: Skin is warm. No rash noted. No erythema. No pallor.   Psychiatric: He has a normal mood and affect. His behavior is normal. Judgment and thought content normal.   Nursing note and " vitals reviewed.      Assessment:     1. Viral URI with cough      Plan:   Viral URI with cough  -     Influenza A & B by Molecular    -increase fluids  -CONT otc symptomatic relief medications  -contact me on Monday if no improvement.     Follow up if symptoms worsen or fail to improve.

## 2019-12-06 NOTE — PATIENT INSTRUCTIONS
Viral Upper Respiratory Illness (Adult)  You have a viral upper respiratory illness (URI), which is another term for the common cold. This illness is contagious during the first few days. It is spread through the air by coughing and sneezing. It may also be spread by direct contact (touching the sick person and then touching your own eyes, nose, or mouth). Frequent handwashing will decrease risk of spread. Most viral illnesses go away within 7 to 10 days with rest and simple home remedies. Sometimes the illness may last for several weeks. Antibiotics will not kill a virus, and they are generally not prescribed for this condition.    Home care  · If symptoms are severe, rest at home for the first 2 to 3 days. When you resume activity, don't let yourself get too tired.  · Avoid being exposed to cigarette smoke (yours or others).  · You may use acetaminophen or ibuprofen to control pain and fever, unless another medicine was prescribed. (Note: If you have chronic liver or kidney disease, have ever had a stomach ulcer or gastrointestinal bleeding, or are taking blood-thinning medicines, talk with your healthcare provider before using these medicines.) Aspirin should never be given to anyone under 18 years of age who is ill with a viral infection or fever. It may cause severe liver or brain damage.  · Your appetite may be poor, so a light diet is fine. Avoid dehydration by drinking 6 to 8 glasses of fluids per day (water, soft drinks, juices, tea, or soup). Extra fluids will help loosen secretions in the nose and lungs.  · Over-the-counter cold medicines will not shorten the length of time youre sick, but they may be helpful for the following symptoms: cough, sore throat, and nasal and sinus congestion. (Note: Do not use decongestants if you have high blood pressure.)  Follow-up care  Follow up with your healthcare provider, or as advised.  When to seek medical advice  Call your healthcare provider right away if any  of these occur:  · Cough with lots of colored sputum (mucus)  · Severe headache; face, neck, or ear pain  · Difficulty swallowing due to throat pain  · Fever of 100.4°F (38°C)  Call 911, or get immediate medical care  Call emergency services right away if any of these occur:  · Chest pain, shortness of breath, wheezing, or difficulty breathing  · Coughing up blood  · Inability to swallow due to throat pain  Date Last Reviewed: 9/13/2015  © 1576-8048 [employer]. [address] [place] [id number]. All rights reserved. This information is not intended as a substitute for professional medical care. Always follow your healthcare professional's instructions.

## 2019-12-08 ENCOUNTER — PATIENT MESSAGE (OUTPATIENT)
Dept: INTERNAL MEDICINE | Facility: CLINIC | Age: 54
End: 2019-12-08

## 2019-12-09 RX ORDER — DOXYCYCLINE 100 MG/1
100 CAPSULE ORAL 2 TIMES DAILY
Qty: 14 CAPSULE | Refills: 0 | Status: SHIPPED | OUTPATIENT
Start: 2019-12-09 | End: 2019-12-16

## 2019-12-10 ENCOUNTER — HOSPITAL ENCOUNTER (OUTPATIENT)
Dept: RADIOLOGY | Facility: HOSPITAL | Age: 54
Discharge: HOME | End: 2019-12-10
Attending: FAMILY MEDICINE
Payer: COMMERCIAL

## 2019-12-10 ENCOUNTER — OFFICE VISIT (OUTPATIENT)
Dept: INTERNAL MEDICINE | Facility: CLINIC | Age: 54
End: 2019-12-10
Payer: COMMERCIAL

## 2019-12-10 ENCOUNTER — PATIENT MESSAGE (OUTPATIENT)
Dept: INTERNAL MEDICINE | Facility: CLINIC | Age: 54
End: 2019-12-10

## 2019-12-10 ENCOUNTER — TELEPHONE (OUTPATIENT)
Dept: INTERNAL MEDICINE | Facility: CLINIC | Age: 54
End: 2019-12-10

## 2019-12-10 VITALS
HEIGHT: 65 IN | TEMPERATURE: 98 F | WEIGHT: 184.94 LBS | BODY MASS INDEX: 30.81 KG/M2 | OXYGEN SATURATION: 98 % | SYSTOLIC BLOOD PRESSURE: 112 MMHG | HEART RATE: 73 BPM | DIASTOLIC BLOOD PRESSURE: 82 MMHG

## 2019-12-10 DIAGNOSIS — J18.9 PNEUMONIA OF BOTH LOWER LOBES DUE TO INFECTIOUS ORGANISM: Primary | ICD-10-CM

## 2019-12-10 DIAGNOSIS — R05.9 COUGH: ICD-10-CM

## 2019-12-10 DIAGNOSIS — J06.9 URI WITH COUGH AND CONGESTION: ICD-10-CM

## 2019-12-10 DIAGNOSIS — R05.9 COUGH: Primary | ICD-10-CM

## 2019-12-10 PROCEDURE — 71046 X-RAY EXAM CHEST 2 VIEWS: CPT | Mod: 26,,, | Performed by: RADIOLOGY

## 2019-12-10 PROCEDURE — 99999 PR PBB SHADOW E&M-EST. PATIENT-LVL III: ICD-10-PCS | Mod: PBBFAC,,, | Performed by: FAMILY MEDICINE

## 2019-12-10 PROCEDURE — 71046 X-RAY EXAM CHEST 2 VIEWS: CPT | Mod: TC

## 2019-12-10 PROCEDURE — 99214 PR OFFICE/OUTPT VISIT, EST, LEVL IV, 30-39 MIN: ICD-10-PCS | Mod: S$GLB,,, | Performed by: FAMILY MEDICINE

## 2019-12-10 PROCEDURE — 99999 PR PBB SHADOW E&M-EST. PATIENT-LVL III: CPT | Mod: PBBFAC,,, | Performed by: FAMILY MEDICINE

## 2019-12-10 PROCEDURE — 71046 XR CHEST PA AND LATERAL: ICD-10-PCS | Mod: 26,,, | Performed by: RADIOLOGY

## 2019-12-10 PROCEDURE — 99214 OFFICE O/P EST MOD 30 MIN: CPT | Mod: S$GLB,,, | Performed by: FAMILY MEDICINE

## 2019-12-10 RX ORDER — PREDNISONE 20 MG/1
TABLET ORAL
Qty: 18 TABLET | Refills: 0 | Status: SHIPPED | OUTPATIENT
Start: 2019-12-10 | End: 2019-12-26

## 2019-12-10 RX ORDER — BENZONATATE 200 MG/1
200 CAPSULE ORAL 3 TIMES DAILY PRN
Qty: 30 CAPSULE | Refills: 1 | Status: SHIPPED | OUTPATIENT
Start: 2019-12-10 | End: 2019-12-20

## 2019-12-10 RX ORDER — CODEINE PHOSPHATE AND GUAIFENESIN 10; 100 MG/5ML; MG/5ML
5-10 SOLUTION ORAL 3 TIMES DAILY PRN
Qty: 118 ML | Refills: 0 | Status: SHIPPED | OUTPATIENT
Start: 2019-12-10 | End: 2019-12-20

## 2019-12-10 NOTE — PROGRESS NOTES
CHIEF COMPLAINT  Cough      HISTORY, ASSESSMENT, and PLAN    1. Cough    2. URI with cough and congestion      ONSET of symptoms reported as 3-4 weeks ago. QUALITY of symptoms described primarily as nonproductive cough. ASSOCIATED SYMPTOMS include nasal congestion, runny nose, fatigue and malaise. No additional ASSOCIATED SYMPTOMS reported. LOCATION of worst symptoms reported as chest (cough). SEVERITY of symptoms described as MODERATE to MODERATELY SEVERE at worst, MODERATE at present. TIMING of symptoms described as symptoms typically worse at night. EXACERBATING FACTORS include recumbent positioning. ALLEVIATING FACTORS include prescribed cough medicine, although it provides unsatisfactory symptom relief. Prior appointment notes reviewed. I educated him on the probable viral nature of this acute illness, how the management was largely supportive and symptom focused. We discussed the risks and benefits of treatment options, and he feels that the severity of his symptoms is sufficient to warrant the treatment prescribed. I discouraged the use of antibiotics at this point. I told him to expect gradual improvement and resolution of symptoms over the next week or two, and I instructed him to let me know if his illness failed to follow this expected course.     Orders Placed This Encounter    X-Ray Chest PA And Lateral    predniSONE (DELTASONE) 20 MG tablet    benzonatate (TESSALON) 200 MG capsule    guaifenesin-codeine 100-10 mg/5 ml (TUSSI-ORGANIDIN NR)  mg/5 mL syrup        Medication List with Changes/Refills   New Medications    BENZONATATE (TESSALON) 200 MG CAPSULE    Take 1 capsule (200 mg total) by mouth 3 (three) times daily as needed for Cough.       Start Date: 12/10/2019End Date: 12/20/2019    GUAIFENESIN-CODEINE 100-10 MG/5 ML (TUSSI-ORGANIDIN NR)  MG/5 ML SYRUP    Take 5-10 mLs by mouth 3 (three) times daily as needed for Cough (FOR BAD COUGH).       Start Date: 12/10/2019End Date:  12/20/2019    PREDNISONE (DELTASONE) 20 MG TABLET    Take 3 tablets by mouth daily for 3 days, then 2 tablets daily for 3 days, then 1 tablets daily for 3 days       Start Date: 12/10/2019End Date: --   Current Medications    BETAMETHASONE VALERATE 0.1% (VALISONE) 0.1 % CREA    Apply topically 2 (two) times daily.       Start Date: 12/3/2019 End Date: --    DOXYCYCLINE (MONODOX) 100 MG CAPSULE    Take 1 capsule (100 mg total) by mouth 2 (two) times daily. Do not take with milk or yogurt for 7 days       Start Date: 12/9/2019 End Date: 12/16/2019    KETOCONAZOLE (NIZORAL) 2 % CREAM    Apply topically 2 (two) times daily. for 21 days       Start Date: 10/22/2019End Date: 12/6/2019   Discontinued Medications    PREDNISONE (DELTASONE) 20 MG TABLET    Take 20 mg by mouth once daily.       Start Date: --        End Date: 12/10/2019    PROMETHAZINE-DEXTROMETHORPHAN (PROMETHAZINE-DM) 6.25-15 MG/5 ML SYRP    Take 5 mLs by mouth.       Start Date: --        End Date: 12/10/2019        Follow up if symptoms worsen or fail to improve.    Problem List Items Addressed This Visit     None      Visit Diagnoses     Cough    -  Primary    Relevant Medications    benzonatate (TESSALON) 200 MG capsule    guaifenesin-codeine 100-10 mg/5 ml (TUSSI-ORGANIDIN NR)  mg/5 mL syrup    Other Relevant Orders    X-Ray Chest PA And Lateral    URI with cough and congestion        Relevant Medications    predniSONE (DELTASONE) 20 MG tablet           REVIEW OF SYSTEMS  Answers for HPI/ROS submitted by the patient on 12/10/2019    chills: No  ear congestion: No  ear pain: No  fever: No  heartburn: No  hemoptysis: No  myalgias: No  postnasal drip: No  rash: No  rhinorrhea: No  sweats: No  weight loss: No  wheezing: No  asthma: No  bronchiectasis: No  bronchitis: No  COPD: No  emphysema: No  environmental allergies: No  pneumonia: No    PHYSICAL EXAM  Vitals:    12/10/19 1035   BP: 112/82   BP Location: Left arm   Patient Position: Sitting   BP  "Method: Large (Manual)   Pulse: 73   Temp: 98.3 °F (36.8 °C)   TempSrc: Tympanic   SpO2: 98%   Weight: 83.9 kg (184 lb 15.5 oz)   Height: 5' 5" (1.651 m)     CONSTITUTIONAL: Vital signs noted. No apparent distress. Does not appear acutely ill or septic. Appears adequately hydrated.  EYES: Pupils equal and reactive. Extraocular movements intact. Sclerae anicteric. Lids and conjunctiva unremarkable.  ENT: External ENT grossly unremarkable. Ear canals and visualized tympanic membranes are unremarkable. Hearing grossly intact. Nasal mucosa pink. Oropharynx moist. Posterior oropharynx is reasonably symmetric with minimal erythema, but is without lesion, ulceration, or exudate.  NECK: Trachea midline. No significant cervical lymphadenopathy.  PULM: Mild scattered rhonchi. Good air movement. Breathing unlabored.  HEART: Auscultation reveals regular rate and rhythm.  DERM: Skin warm and moist with normal turgor.     Documentation entered by me for this encounter may have been done in part using speech-recognition technology. Although I have made an effort to ensure accuracy, "sound like" errors may exist and should be interpreted in context. -GAL Garcia MD.   "

## 2019-12-11 ENCOUNTER — PATIENT MESSAGE (OUTPATIENT)
Dept: INTERNAL MEDICINE | Facility: CLINIC | Age: 54
End: 2019-12-11

## 2019-12-11 NOTE — TELEPHONE ENCOUNTER
Vinny Negrete.    I recommend repeating your chest x-ray in 3 months. It should be back to normal by then.    You can schedule your chest x-ray from your [facility] account or from the [facility] on your smartphone or by calling our appointment desk at [phone].     Take care, be well, and have a Happy Holidays!    -LM     Orders Placed This Encounter   Procedures    X-Ray Chest PA And Lateral     DUE IN MID-MARCH, 2020.     Standing Status:   Future     Standing Expiration Date:   12/10/2020     Order Specific Question:   May the Radiologist modify the order per protocol to meet the clinical needs of the patient?     Answer:   Yes

## 2019-12-11 NOTE — TELEPHONE ENCOUNTER
I called him and reviewed his chest x-ray results with him.  I instructed him to take the full course of doxycycline as directed.  He will let me know if he does not start getting better within the next few days.

## 2019-12-24 ENCOUNTER — OFFICE VISIT (OUTPATIENT)
Dept: URGENT CARE | Facility: CLINIC | Age: 54
End: 2019-12-24
Payer: COMMERCIAL

## 2019-12-24 VITALS
BODY MASS INDEX: 30.66 KG/M2 | SYSTOLIC BLOOD PRESSURE: 123 MMHG | OXYGEN SATURATION: 95 % | RESPIRATION RATE: 20 BRPM | HEART RATE: 70 BPM | WEIGHT: 184 LBS | TEMPERATURE: 98 F | HEIGHT: 65 IN | DIASTOLIC BLOOD PRESSURE: 69 MMHG

## 2019-12-24 DIAGNOSIS — J02.9 SORE THROAT: Primary | ICD-10-CM

## 2019-12-24 DIAGNOSIS — B96.89 ACUTE BACTERIAL PHARYNGITIS: ICD-10-CM

## 2019-12-24 DIAGNOSIS — J02.8 ACUTE BACTERIAL PHARYNGITIS: ICD-10-CM

## 2019-12-24 DIAGNOSIS — K13.79 MOUTH SORES: ICD-10-CM

## 2019-12-24 LAB
CTP QC/QA: YES
S PYO RRNA THROAT QL PROBE: NEGATIVE

## 2019-12-24 PROCEDURE — 87880 STREP A ASSAY W/OPTIC: CPT | Mod: QW,S$GLB,, | Performed by: PHYSICIAN ASSISTANT

## 2019-12-24 PROCEDURE — 99214 OFFICE O/P EST MOD 30 MIN: CPT | Mod: S$GLB,,, | Performed by: PHYSICIAN ASSISTANT

## 2019-12-24 PROCEDURE — 99214 PR OFFICE/OUTPT VISIT, EST, LEVL IV, 30-39 MIN: ICD-10-PCS | Mod: S$GLB,,, | Performed by: PHYSICIAN ASSISTANT

## 2019-12-24 PROCEDURE — 87880 POCT RAPID STREP A: ICD-10-PCS | Mod: QW,S$GLB,, | Performed by: PHYSICIAN ASSISTANT

## 2019-12-24 RX ORDER — NYSTATIN 100000 [USP'U]/ML
5 SUSPENSION ORAL 4 TIMES DAILY
Qty: 160 ML | Refills: 0 | Status: SHIPPED | OUTPATIENT
Start: 2019-12-24 | End: 2019-12-26

## 2019-12-24 RX ORDER — AMOXICILLIN 500 MG/1
500 CAPSULE ORAL EVERY 12 HOURS
Qty: 20 CAPSULE | Refills: 0 | Status: SHIPPED | OUTPATIENT
Start: 2019-12-24 | End: 2019-12-26

## 2019-12-24 NOTE — PROGRESS NOTES
"Subjective:       Patient ID: Vinny Stover is a 54 y.o. male.    Vitals:  height is 5' 5" (1.651 m) and weight is 83.5 kg (184 lb). His oral temperature is 98 °F (36.7 °C). His blood pressure is 123/69 and his pulse is 70. His respiration is 20 and oxygen saturation is 95%.     Chief Complaint: URI and Sore Throat    Sore throat x1 week/// Fought URI for couple of weeks/// Took all meds associated with that/// Hurts to swallow or eat//// Poss sores in throat/// OTC Meds not helping    URI    The current episode started in the past 7 days. The problem has been gradually worsening. There has been no fever. Associated symptoms include sinus pain and a sore throat. Pertinent negatives include no congestion, coughing, ear pain, nausea, rash, vomiting or wheezing. Treatments tried: cough drops. The treatment provided no relief.   Sore Throat    Pertinent negatives include no congestion, coughing, ear pain, shortness of breath, stridor or vomiting.       Constitution: Negative for chills, sweating, fatigue and fever.   HENT: Positive for sinus pain, sinus pressure and sore throat. Negative for ear pain, congestion and voice change.    Neck: Positive for painful lymph nodes.   Eyes: Negative for eye redness.   Respiratory: Negative for chest tightness, cough, sputum production, bloody sputum, COPD, shortness of breath, stridor, wheezing and asthma.    Gastrointestinal: Negative for nausea and vomiting.   Musculoskeletal: Negative for muscle ache.   Skin: Negative for rash.   Allergic/Immunologic: Negative for seasonal allergies and asthma.   Hematologic/Lymphatic: Positive for swollen lymph nodes.       Objective:      Physical Exam   Constitutional: He is oriented to person, place, and time. He appears well-developed and well-nourished. He is cooperative.  Non-toxic appearance. He does not have a sickly appearance. He does not appear ill. No distress.   HENT:   Head: Normocephalic and atraumatic.   Right Ear: Hearing, " tympanic membrane, external ear and ear canal normal.   Left Ear: Hearing, tympanic membrane, external ear and ear canal normal.   Nose: Nose normal. No mucosal edema, rhinorrhea or nasal deformity. No epistaxis. Right sinus exhibits no maxillary sinus tenderness and no frontal sinus tenderness. Left sinus exhibits no maxillary sinus tenderness and no frontal sinus tenderness.   Mouth/Throat: Uvula is midline and mucous membranes are normal. Oral lesions (many ulceration with surrounding erythema on uvula/back of throat ) present. No trismus in the jaw. Normal dentition. No uvula swelling. Oropharyngeal exudate (? left side ) and posterior oropharyngeal erythema present. No posterior oropharyngeal edema.   Eyes: Conjunctivae and lids are normal. No scleral icterus.   Neck: Trachea normal, full passive range of motion without pain and phonation normal. Neck supple. No neck rigidity. No edema and no erythema present.   Cardiovascular: Normal rate, regular rhythm, normal heart sounds, intact distal pulses and normal pulses.   Pulmonary/Chest: Effort normal and breath sounds normal. No respiratory distress. He has no decreased breath sounds. He has no rhonchi.   Abdominal: Normal appearance.   Musculoskeletal: Normal range of motion. He exhibits no edema or deformity.   Lymphadenopathy:        Head (right side): No submental, no submandibular, no tonsillar and no preauricular adenopathy present.        Head (left side): Submandibular adenopathy present. No submental, no tonsillar and no preauricular adenopathy present.   Neurological: He is alert and oriented to person, place, and time. He exhibits normal muscle tone. Coordination normal.   Skin: Skin is warm, dry, intact, not diaphoretic and not pale.   Psychiatric: He has a normal mood and affect. His speech is normal and behavior is normal. Judgment and thought content normal. Cognition and memory are normal.   Nursing note and vitals reviewed.        Assessment:        1. Sore throat    2. Acute bacterial pharyngitis    3. Mouth sores        Plan:         Sore throat  -     POCT rapid strep A  -     (Magic mouthwash) 1:1:1 Benadryl 12.5mg/5ml liq, aluminum & magnesium hydroxide-simehticone (Maalox), lidocaine viscous 2%; for mouth sores  Dispense: 480 mL; Refill: 0    Acute bacterial pharyngitis  -     amoxicillin (AMOXIL) 500 MG capsule; Take 1 capsule (500 mg total) by mouth every 12 (twelve) hours. for 10 days  Dispense: 20 capsule; Refill: 0    Mouth sores  -     (Magic mouthwash) 1:1:1 Benadryl 12.5mg/5ml liq, aluminum & magnesium hydroxide-simehticone (Maalox), lidocaine viscous 2%; for mouth sores  Dispense: 480 mL; Refill: 0    Other orders  -     nystatin (MYCOSTATIN) 100,000 unit/mL suspension; Take 5 mLs (500,000 Units total) by mouth 4 (four) times daily. Swish and swallow over 20 minutes for 14 days  Dispense: 160 mL; Refill: 0         Acute Pharyngitis/Mouth Ulcers    -  Rapid strep negative but high pretest criteria (lack of cough, ? Exudate, lymphadenopathy) - discussed starting Amoxicillin since leaving for out of town tomorrow    -  Magic mouthwash for ulcerations    -  Hot/cold liquids, popsicles, lozenges   -  Tylenol/ibuprofen for throat pain    -  Follow-up with primary care physician if no improvement in symptoms in next 4-5 days     Krysten Jaquez PA-C   Physician Assistant   [facility]

## 2019-12-24 NOTE — PATIENT INSTRUCTIONS
Magic mouth wash swish and spit every 4-6 hours as needed for sores   Take antibiotic as prescribed  Hot/cold liquids/popsicles/lozenges/chloraseptic spray   If no improvement with antibiotic and magic mouthwash, then trial of nystatin wash

## 2019-12-26 ENCOUNTER — OFFICE VISIT (OUTPATIENT)
Dept: INTERNAL MEDICINE | Facility: CLINIC | Age: 54
End: 2019-12-26
Payer: COMMERCIAL

## 2019-12-26 VITALS
SYSTOLIC BLOOD PRESSURE: 102 MMHG | HEART RATE: 64 BPM | WEIGHT: 185.63 LBS | TEMPERATURE: 98 F | OXYGEN SATURATION: 98 % | BODY MASS INDEX: 30.93 KG/M2 | HEIGHT: 65 IN | DIASTOLIC BLOOD PRESSURE: 70 MMHG

## 2019-12-26 DIAGNOSIS — G47.33 OBSTRUCTIVE SLEEP APNEA SYNDROME: ICD-10-CM

## 2019-12-26 DIAGNOSIS — K12.0 APHTHOUS STOMATITIS: Primary | ICD-10-CM

## 2019-12-26 DIAGNOSIS — J02.9 SORE THROAT: ICD-10-CM

## 2019-12-26 PROCEDURE — 99999 PR PBB SHADOW E&M-EST. PATIENT-LVL III: ICD-10-PCS | Mod: PBBFAC,,, | Performed by: FAMILY MEDICINE

## 2019-12-26 PROCEDURE — 99213 PR OFFICE/OUTPT VISIT, EST, LEVL III, 20-29 MIN: ICD-10-PCS | Mod: S$GLB,,, | Performed by: FAMILY MEDICINE

## 2019-12-26 PROCEDURE — 99213 OFFICE O/P EST LOW 20 MIN: CPT | Mod: S$GLB,,, | Performed by: FAMILY MEDICINE

## 2019-12-26 PROCEDURE — 99999 PR PBB SHADOW E&M-EST. PATIENT-LVL III: CPT | Mod: PBBFAC,,, | Performed by: FAMILY MEDICINE

## 2019-12-26 RX ORDER — PREDNISONE 20 MG/1
40 TABLET ORAL DAILY
Qty: 14 TABLET | Refills: 0 | Status: SHIPPED | OUTPATIENT
Start: 2019-12-26 | End: 2020-01-02

## 2019-12-27 ENCOUNTER — TELEPHONE (OUTPATIENT)
Dept: URGENT CARE | Facility: CLINIC | Age: 54
End: 2019-12-27

## 2019-12-27 NOTE — PATIENT INSTRUCTIONS
Someone from [facility] will be contacting you soon to help you get the home sleep test done. If you haven't heard from them within 2 weeks, please call the [facility] at [phone] and let them know that you were ordered to have a home sleep test done and that you haven't yet been contacted.       Canker Sore    A canker sore (also called an aphthous ulcer) is a painful sore on the lining of the mouth. It is most painful during the first few days, and it lasts about 7 to 14 days before going away.  Causes  Canker sores are not cold sores or fever blisters. They are not contagious, so they are not spread by contact. The exact cause of canker sores is not clear, but there are a number of things that can trigger them in different people.  · Mild injury, such as biting the inside of the mouth, lip, or cheek, or dental procedures  · Stress  · Poor diet, or lack of certain nutrients, including B vitamins and iron  · Foods that can irritate the mouth, including tomatoes, citrus fruits, and some nuts (foods that are acidic or contain bitter substances called tannins)  · Irritating chemicals, such as those in some toothpastes and mouthwashes  · Certain chronic illnesses  Symptoms  Canker sores are found on the lining of the mouth. They can be inside the cheeks or lips, on the roof of the mouth, at the base of the gums, on the tongue, or in the back of the throat. Canker sores typically have these characteristics:  · Small, flat (not raised) sores  · Can be white or yellowish bumps that are red around the edges or have a red halo  · Usually small in size, roundish, and in groups  · Accompanied by pain or burning  Canker sores do not leave a scar. But they usually come back.  Home care  The goals of canker sore treatment are to decrease the pain, speed healing, and prevent recurrence. No single treatment works for everyone. Try a number of techniques to see what works best.  General care  · You may find that soft,  easy-to-chew foods cause less pain. Use a straw to direct liquids away from the sore.  · Use a soft-bristle toothbrush, and brush your teeth gently.  · Avoid acidic, salty, or spicy foods.  · Avoid injuring the inside of your mouth, or scraping your existing canker sores, by avoiding crusty and crunchy foods like french bread and chips.  Medicines  You can try over-the-counter medicines that cover the sores and numb them. This protects the sores while they heal and helps reduce pain.  Homemade rinses and solutions  You can use these solutions as mouth rinses. Spit them out after using them. You can also dab them on the sores. You can repeat these treatments as often as needed.  · Rinse your mouth with saltwater.  · Mix equal amounts of hydrogen peroxide and water. You can use it as a mouthwash or dab it on spots with a cotton swab. You can also add sodium bicarbonate to this to make a paste, and then dab it on spots.  Follow-up care  Follow up with your healthcare provider, or as advised.  · If a culture was done, you will be notified if the treatment needs to be changed. You can call as directed for the results.  Call 911  Contact emergency services if any of these occur:  · Trouble breathing  · Inability to swallow  · Extreme drowsiness or trouble awakening  · Fainting or loss of consciousness  · Rapid heart rate  · Seizure  · Stiff neck  When to seek medical advice  Call your healthcare provider right away if any of these occur:  · You have a fever of 100.4°F (38°C) or higher.  · You are pregnant.  · You just had surgery or another medical procedure, or you were just discharged from the hospital.  · You are unable to eat or swallow due to pain.  Date Last Reviewed: 7/30/2015  © 6329-6274 [employer]. [address] [place] [address], [place] [address]. All rights reserved. This information is not intended as a substitute for professional medical care. Always follow your healthcare professional's  instructions.        Self-Care for Sore Throats    Sore throats happen for many reasons, such as colds, allergies, and infections caused by viruses or bacteria. In any case, your throat becomes red and sore. Your goal for self-care is to reduce your discomfort while giving your throat a chance to heal.  Moisten and soothe your throat  Tips include the following:  · Try a sip of water first thing after waking up.  · Keep your throat moist by drinking 6 or more glasses of clear liquids every day.  · Run a cool-air humidifier in your room overnight.  · Avoid cigarette smoke.   · Suck on throat lozenges, cough drops, hard candy, ice chips, or frozen fruit-juice bars. Use the sugar-free versions if your diet or medical condition requires them.  Gargle to ease irritation  Gargling every hour or 2 can ease irritation. Try gargling with 1 of these solutions:  · 1/4 teaspoon of salt in 1/2 cup of warm water  · An over-the-counter anesthetic gargle  Use medicine for more relief  Over-the-counter medicine can reduce sore throat symptoms. Ask your pharmacist if you have questions about which medicine to use:  · Ease pain with anesthetic sprays. Aspirin or an aspirin substitute also helps. Remember, never give aspirin to anyone 18 or younger, or if you are already taking blood thinners.   · For sore throats caused by allergies, try antihistamines to block the allergic reaction.  · Remember: unless a sore throat is caused by a bacterial infection, antibiotics wont help you.  Prevent future sore throats  Prevention tips include the following:  · Stop smoking or reduce contact with secondhand smoke. Smoke irritates the tender throat lining.  · Limit contact with pets and with allergy-causing substances, such as pollen and mold.  · When youre around someone with a sore throat or cold, wash your hands often to keep viruses or bacteria from spreading.  · Dont strain your vocal cords.  Call your healthcare provider  Contact your  healthcare provider if you have:  · A temperature over 101°F (38.3°C)  · White spots on the throat  · Great difficulty swallowing  · Trouble breathing  · A skin rash  · Recent exposure to someone else with strep bacteria  · Severe hoarseness and swollen glands in the neck or jaw   Date Last Reviewed: 8/1/2016  © 2159-9947 [employer]. [address] [place] [address]. All rights reserved. This information is not intended as a substitute for professional medical care. Always follow your healthcare professional's instructions.

## 2019-12-27 NOTE — ASSESSMENT & PLAN NOTE
STOP-BANG questionnaire to assess risk for obstructive sleep apnea (KARI)  · Snoring: Do you snore loudly? ANSWER: YES  · Tired: Do you often feel tired, fatigued, or sleepy during daytime? ANSWER: YES [place] Sleepiness Scale Score = 6 (HIGHER NORMAL Daytime Sleepiness)  · Observed: Has anyone observed you stop breathing during your sleep? ANSWER: YES  · Pressure: Do you have or are you being treated for high blood pressure? ANSWER: NO  · BMI: BMI more than 35 kg/m2? ANSWER: NO (BMI = Body mass index is 30.89 kg/m².)   · Age: Age over 50 yr old? ANSWER: YES (Age = 54 y.o.)  · Neck circumference: Neck circumference greater than 40 cm? ANSWER: YES (Neck circumference = 45 cm, Mallampati class 1 oropharynx.  · Gender: Gender male? ANSWER: YES (Gender = male)    STOP-BANG Score = 6 (HIGH risk of KARI)    ADDITIONAL RELEVANT HISTORY: He has noticed or has been told that he wakes up gasping or choking.  He has been told he stops breathing when he sleeps.     INSTRUCTIONS PROVIDED: Do not drive or perform hazardous activities while drowsy.

## 2019-12-30 ENCOUNTER — TELEPHONE (OUTPATIENT)
Dept: PULMONOLOGY | Facility: HOSPITAL | Age: 54
End: 2019-12-30

## 2020-01-11 NOTE — PROGRESS NOTES
CHIEF COMPLAINT  Sore Throat      HISTORY, ASSESSMENT, and PLAN    1. Aphthous stomatitis  2. Sore throat        ASSESSMENT: Typical oral aphthae mostly posterior soft palate. We discussed risks and benefits of treatment options.    3. Obstructive sleep apnea syndrome        ASSESSMENT: He has multiple anatomic and historical risk factors for obstructive sleep apnea, as noted below.       Orders Placed This Encounter    Home Sleep Studies    predniSONE (DELTASONE) 20 MG tablet        Outpatient Medications Prior to Visit   Medication Sig Dispense Refill    (Magic mouthwash) 1:1:1 Benadryl 12.5mg/5ml liq, aluminum & magnesium hydroxide-simehticone (Maalox), lidocaine viscous 2% for mouth sores 480 mL 0    amoxicillin (AMOXIL) 500 MG capsule Take 1 capsule (500 mg total) by mouth every 12 (twelve) hours. for 10 days 20 capsule 0    betamethasone valerate 0.1% (VALISONE) 0.1 % Crea Apply topically 2 (two) times daily. (Patient not taking: Reported on 12/24/2019) 45 g 1    ketoconazole (NIZORAL) 2 % cream Apply topically 2 (two) times daily. for 21 days 60 g 0    nystatin (MYCOSTATIN) 100,000 unit/mL suspension Take 5 mLs (500,000 Units total) by mouth 4 (four) times daily. Swish and swallow over 20 minutes for 14 days 160 mL 0    predniSONE (DELTASONE) 20 MG tablet Take 3 tablets by mouth daily for 3 days, then 2 tablets daily for 3 days, then 1 tablets daily for 3 days (Patient not taking: Reported on 12/24/2019) 18 tablet 0     No facility-administered medications prior to visit.         Medications Ordered This Encounter   Medications    predniSONE (DELTASONE) 20 MG tablet     Sig: Take 2 tablets (40 mg total) by mouth once daily. for 7 days     Dispense:  14 tablet     Refill:  0        Medications Discontinued During This Encounter   Medication Reason    nystatin (MYCOSTATIN) 100,000 unit/mL suspension Patient no longer taking    predniSONE (DELTASONE) 20 MG tablet Patient no longer taking     "amoxicillin (AMOXIL) 500 MG capsule Therapy not effective        Follow up if symptoms worsen or fail to improve.    Problem List Items Addressed This Visit        ENT    Aphthous stomatitis - Primary       Other    Obstructive sleep apnea syndrome (Chronic)    Current Assessment & Plan     STOP-BANG questionnaire to assess risk for obstructive sleep apnea (KARI)  · Snoring: Do you snore loudly? ANSWER: YES  · Tired: Do you often feel tired, fatigued, or sleepy during daytime? ANSWER: YES [place] Sleepiness Scale Score = 6 (HIGHER NORMAL Daytime Sleepiness)  · Observed: Has anyone observed you stop breathing during your sleep? ANSWER: YES  · Pressure: Do you have or are you being treated for high blood pressure? ANSWER: NO  · BMI: BMI more than 35 kg/m2? ANSWER: NO (BMI = Body mass index is 30.89 kg/m².)   · Age: Age over 50 yr old? ANSWER: YES (Age = 54 y.o.)  · Neck circumference: Neck circumference greater than 40 cm? ANSWER: YES (Neck circumference = 45 cm, Mallampati class 1 oropharynx.  · Gender: Gender male? ANSWER: YES (Gender = male)    STOP-BANG Score = 6 (HIGH risk of KARI)    ADDITIONAL RELEVANT HISTORY: He has noticed or has been told that he wakes up gasping or choking.  He has been told he stops breathing when he sleeps.     INSTRUCTIONS PROVIDED: Do not drive or perform hazardous activities while drowsy.          Relevant Orders    Home Sleep Studies      Other Visit Diagnoses     Sore throat               REVIEW OF SYSTEMS  CONSTITUTIONAL: No fever or chills reported.   ENT: No otalgia reported.    PHYSICAL EXAM  Vitals:    12/26/19 1702   BP: 102/70   BP Location: Right arm   Patient Position: Sitting   BP Method: Large (Manual)   Pulse: 64   Temp: 98.3 °F (36.8 °C)   TempSrc: Oral   SpO2: 98%   Weight: 84.2 kg (185 lb 10 oz)   Height: 5' 5" (1.651 m)     CONSTITUTIONAL: Vital signs noted. No apparent distress. Does not appear acutely ill or septic. Appears adequately hydrated.  EYES: Pupils equal " "and reactive. Extraocular movements intact. Sclerae anicteric. Lids and conjunctiva unremarkable.  ENT: External ENT grossly unremarkable. Ear canals and visualized tympanic membranes are unremarkable. Hearing grossly intact. Oropharynx moist. Posterior oropharynx is reasonably symmetric, with aphthae as described above. No exudate.  NECK: Neck supple without meningismus. Trachea midline. Mild to moderate bilateral anterior cervical lymphadenopathy.  PULM: Lungs clear. Breathing unlabored.  HEART: Auscultation reveals regular rate and rhythm.  ABDOMEN: Soft and nontender. Bowel sounds present.  DERM: Skin warm and moist with normal turgor.      Documentation entered by me for this encounter may have been done in part using speech-recognition technology. Although I have made an effort to ensure accuracy, "sound like" errors may exist and should be interpreted in context. -GAL Garcia MD.   "

## 2020-01-20 ENCOUNTER — TELEPHONE (OUTPATIENT)
Dept: INTERNAL MEDICINE | Facility: CLINIC | Age: 55
End: 2020-01-20

## 2020-01-20 DIAGNOSIS — G47.33 OBSTRUCTIVE SLEEP APNEA SYNDROME: Primary | Chronic | ICD-10-CM

## 2020-01-20 NOTE — TELEPHONE ENCOUNTER
Obstructive sleep apnea syndrome   INTERVAL HISTORY: Insurance denied in-home sleep test.   PLAN: Polysomnography 4 or more parameters    STOP-BANG questionnaire to assess risk for obstructive sleep apnea (KARI)  · Snoring: Do you snore loudly? ANSWER: YES  · Tired: Do you often feel tired, fatigued, or sleepy during daytime? ANSWER: YES [place] Sleepiness Scale Score = 6 (HIGHER NORMAL Daytime Sleepiness)  · Observed: Has anyone observed you stop breathing during your sleep? ANSWER: YES  · Pressure: Do you have or are you being treated for high blood pressure? ANSWER: NO  · BMI: BMI more than 35 kg/m2? ANSWER: NO (BMI = Body mass index is 30.89 kg/m².)   · Age: Age over 50 yr old? ANSWER: YES (Age = 54 y.o.)  · Neck circumference: Neck circumference greater than 40 cm? ANSWER: YES (Neck circumference = 45 cm, Mallampati class 1 oropharynx.  · Gender: Gender male? ANSWER: YES (Gender = male)     STOP-BANG Score = 6 (HIGH risk of KARI)     ADDITIONAL RELEVANT HISTORY: He has noticed or has been told that he wakes up gasping or choking.  He has been told he stops breathing when he sleeps.

## 2020-02-12 ENCOUNTER — HOSPITAL ENCOUNTER (OUTPATIENT)
Dept: RADIOLOGY | Facility: HOSPITAL | Age: 55
Discharge: HOME | End: 2020-02-12
Attending: PHYSICIAN ASSISTANT
Payer: COMMERCIAL

## 2020-02-12 ENCOUNTER — OFFICE VISIT (OUTPATIENT)
Dept: INTERNAL MEDICINE | Facility: CLINIC | Age: 55
End: 2020-02-12
Payer: COMMERCIAL

## 2020-02-12 VITALS
DIASTOLIC BLOOD PRESSURE: 70 MMHG | BODY MASS INDEX: 30.81 KG/M2 | HEART RATE: 67 BPM | OXYGEN SATURATION: 97 % | WEIGHT: 184.94 LBS | HEIGHT: 65 IN | SYSTOLIC BLOOD PRESSURE: 104 MMHG | TEMPERATURE: 98 F

## 2020-02-12 DIAGNOSIS — J01.90 ACUTE SINUSITIS, RECURRENCE NOT SPECIFIED, UNSPECIFIED LOCATION: Primary | ICD-10-CM

## 2020-02-12 DIAGNOSIS — R93.89 ABNORMAL CHEST X-RAY: ICD-10-CM

## 2020-02-12 DIAGNOSIS — R07.89 CHEST WALL PAIN: ICD-10-CM

## 2020-02-12 PROCEDURE — 99999 PR PBB SHADOW E&M-EST. PATIENT-LVL III: ICD-10-PCS | Mod: PBBFAC,,, | Performed by: PHYSICIAN ASSISTANT

## 2020-02-12 PROCEDURE — 71046 X-RAY EXAM CHEST 2 VIEWS: CPT | Mod: TC

## 2020-02-12 PROCEDURE — 99214 OFFICE O/P EST MOD 30 MIN: CPT | Mod: S$GLB,,, | Performed by: PHYSICIAN ASSISTANT

## 2020-02-12 PROCEDURE — 71046 XR CHEST PA AND LATERAL: ICD-10-PCS | Mod: 26,,, | Performed by: RADIOLOGY

## 2020-02-12 PROCEDURE — 99999 PR PBB SHADOW E&M-EST. PATIENT-LVL III: CPT | Mod: PBBFAC,,, | Performed by: PHYSICIAN ASSISTANT

## 2020-02-12 PROCEDURE — 99214 PR OFFICE/OUTPT VISIT, EST, LEVL IV, 30-39 MIN: ICD-10-PCS | Mod: S$GLB,,, | Performed by: PHYSICIAN ASSISTANT

## 2020-02-12 PROCEDURE — 71046 X-RAY EXAM CHEST 2 VIEWS: CPT | Mod: 26,,, | Performed by: RADIOLOGY

## 2020-02-12 RX ORDER — AZITHROMYCIN 250 MG/1
TABLET, FILM COATED ORAL
Qty: 6 TABLET | Refills: 0 | Status: SHIPPED | OUTPATIENT
Start: 2020-02-12 | End: 2020-02-21

## 2020-02-12 RX ORDER — PROMETHAZINE HYDROCHLORIDE AND DEXTROMETHORPHAN HYDROBROMIDE 6.25; 15 MG/5ML; MG/5ML
5 SYRUP ORAL NIGHTLY
Qty: 118 ML | Refills: 0 | Status: SHIPPED | OUTPATIENT
Start: 2020-02-12 | End: 2020-02-21 | Stop reason: SDUPTHER

## 2020-02-12 RX ORDER — PREDNISONE 20 MG/1
TABLET ORAL
Qty: 9 TABLET | Refills: 0 | Status: SHIPPED | OUTPATIENT
Start: 2020-02-12 | End: 2020-02-21

## 2020-02-12 NOTE — PATIENT INSTRUCTIONS

## 2020-02-12 NOTE — PROGRESS NOTES
Subjective:      Patient ID: Vinny Stover is a 54 y.o. male.    Chief Complaint: Cough and Chest Pain    Cough   The current episode started in the past 7 days. The problem has been rapidly worsening. The problem occurs hourly. The cough is non-productive. Associated symptoms include chest pain, headaches, nasal congestion, postnasal drip and rhinorrhea. Pertinent negatives include no chills, ear congestion, ear pain, fever, heartburn, hemoptysis, myalgias, rash, sore throat, shortness of breath, sweats, weight loss or wheezing. Nothing aggravates the symptoms. He has tried OTC cough suppressant for the symptoms. The treatment provided no relief. There is no history of asthma, bronchiectasis, bronchitis, COPD, emphysema, environmental allergies or pneumonia.   Additionally, pt states that in December he had an abnormal chest xray and was told to repeat it. He has not yet repeated the xray.   He does have some rib pain on the right anterior side. He also did some heavy lifting the past week, which could have caused a strain.     Review of Systems   Constitutional: Negative for activity change, appetite change, chills, diaphoresis, fatigue, fever, unexpected weight change and weight loss.   HENT: Positive for congestion, postnasal drip, rhinorrhea, sinus pressure and sinus pain. Negative for ear pain, hearing loss, sore throat, trouble swallowing and voice change.    Eyes: Negative.  Negative for visual disturbance.   Respiratory: Positive for cough. Negative for hemoptysis, choking, chest tightness, shortness of breath and wheezing.    Cardiovascular: Positive for chest pain. Negative for palpitations and leg swelling.   Gastrointestinal: Negative for abdominal distention, abdominal pain, blood in stool, constipation, diarrhea, heartburn, nausea and vomiting.   Endocrine: Negative for cold intolerance, heat intolerance, polydipsia and polyuria.   Genitourinary: Negative.  Negative for difficulty urinating and  "frequency.   Musculoskeletal: Negative for arthralgias, back pain, gait problem, joint swelling and myalgias.   Skin: Negative for color change, pallor, rash and wound.   Allergic/Immunologic: Negative for environmental allergies.   Neurological: Positive for headaches. Negative for dizziness, tremors, weakness, light-headedness and numbness.   Hematological: Negative for adenopathy.   Psychiatric/Behavioral: Negative for behavioral problems, confusion, self-injury, sleep disturbance and suicidal ideas. The patient is not nervous/anxious.      Objective:   /70 (BP Location: Right arm, Patient Position: Sitting, BP Method: Medium (Manual))   Pulse 67   Temp 98.1 °F (36.7 °C) (Tympanic)   Ht 5' 5" (1.651 m)   Wt 83.9 kg (184 lb 15.5 oz)   SpO2 97%   BMI 30.78 kg/m²     Physical Exam   Constitutional: He is oriented to person, place, and time. He appears well-developed and well-nourished. No distress.   HENT:   Head: Normocephalic and atraumatic.   Right Ear: Hearing, tympanic membrane, external ear and ear canal normal. No tenderness.   Left Ear: Hearing, tympanic membrane, external ear and ear canal normal. No tenderness.   Nose: Mucosal edema and rhinorrhea present. No sinus tenderness. Right sinus exhibits maxillary sinus tenderness and frontal sinus tenderness. Left sinus exhibits maxillary sinus tenderness and frontal sinus tenderness.   Mouth/Throat: Uvula is midline and mucous membranes are normal. No oral lesions. Normal dentition. No dental abscesses, uvula swelling or dental caries. Oropharyngeal exudate and posterior oropharyngeal erythema present. No posterior oropharyngeal edema or tonsillar abscesses. No tonsillar exudate.   Eyes: Pupils are equal, round, and reactive to light. Conjunctivae and EOM are normal. Right eye exhibits no discharge. Left eye exhibits no discharge.   Neck: Normal range of motion. Neck supple.   Cardiovascular: Normal rate, regular rhythm and normal heart sounds. Exam " reveals no gallop and no friction rub.   No murmur heard.  Pulmonary/Chest: Effort normal and breath sounds normal. No respiratory distress. He has no wheezes. He has no rales.   Lymphadenopathy:     He has no cervical adenopathy.   Neurological: He is alert and oriented to person, place, and time.   Skin: Skin is warm. No rash noted. He is not diaphoretic. No erythema. No pallor.   Psychiatric: He has a normal mood and affect. His behavior is normal. Judgment and thought content normal.   Nursing note and vitals reviewed.      Assessment:     1. Acute sinusitis, recurrence not specified, unspecified location    2. Abnormal chest x-ray    3. Chest wall pain      Plan:   Acute sinusitis, recurrence not specified, unspecified location  -     predniSONE (DELTASONE) 20 MG tablet; Take 2 tablets with food for 3 days; then take one tablet with food for 2 days; then take 1/2 tablet with food for 2 days.  Dispense: 9 tablet; Refill: 0  -     azithromycin (ZITHROMAX Z-MEREDITH) 250 MG tablet; Take 2 tabs po on day 1 followed by 1 tab po from day 2-5  Dispense: 6 tablet; Refill: 0  -     promethazine-dextromethorphan (PROMETHAZINE-DM) 6.25-15 mg/5 mL Syrp; Take 5 mLs by mouth every evening. for 5 days  Dispense: 118 mL; Refill: 0  Steroids can increase blood sugar and blood pressure. Therefore, diabetics need to check their blood sugar regularly while taking a steroid. Patients with hypertension need to check their blood pressure regularly as well.      Abnormal chest x-ray  -     X-Ray Chest PA And Lateral; Future; Expected date: 02/12/2020    Chest wall pain  -will check CXR. Handout on costochondritis provided to him today. Prednisone should help.       Follow up if symptoms worsen or fail to improve.

## 2020-02-21 ENCOUNTER — OFFICE VISIT (OUTPATIENT)
Dept: INTERNAL MEDICINE | Facility: CLINIC | Age: 55
End: 2020-02-21
Payer: COMMERCIAL

## 2020-02-21 VITALS
SYSTOLIC BLOOD PRESSURE: 110 MMHG | TEMPERATURE: 98 F | BODY MASS INDEX: 30.64 KG/M2 | HEART RATE: 69 BPM | OXYGEN SATURATION: 98 % | WEIGHT: 183.88 LBS | HEIGHT: 65 IN | DIASTOLIC BLOOD PRESSURE: 72 MMHG

## 2020-02-21 DIAGNOSIS — Z12.12 SCREENING FOR COLORECTAL CANCER: ICD-10-CM

## 2020-02-21 DIAGNOSIS — Z12.11 SCREENING FOR COLORECTAL CANCER: ICD-10-CM

## 2020-02-21 DIAGNOSIS — J01.00 ACUTE NON-RECURRENT MAXILLARY SINUSITIS: Primary | ICD-10-CM

## 2020-02-21 DIAGNOSIS — J06.9 URI WITH COUGH AND CONGESTION: ICD-10-CM

## 2020-02-21 DIAGNOSIS — K12.0 APHTHOUS STOMATITIS: ICD-10-CM

## 2020-02-21 PROCEDURE — 99214 PR OFFICE/OUTPT VISIT, EST, LEVL IV, 30-39 MIN: ICD-10-PCS | Mod: S$GLB,,, | Performed by: FAMILY MEDICINE

## 2020-02-21 PROCEDURE — 99999 PR PBB SHADOW E&M-EST. PATIENT-LVL III: ICD-10-PCS | Mod: PBBFAC,,, | Performed by: FAMILY MEDICINE

## 2020-02-21 PROCEDURE — 99999 PR PBB SHADOW E&M-EST. PATIENT-LVL III: CPT | Mod: PBBFAC,,, | Performed by: FAMILY MEDICINE

## 2020-02-21 PROCEDURE — 99214 OFFICE O/P EST MOD 30 MIN: CPT | Mod: S$GLB,,, | Performed by: FAMILY MEDICINE

## 2020-02-21 RX ORDER — FLUTICASONE PROPIONATE 50 MCG
2 SPRAY, SUSPENSION (ML) NASAL DAILY
Qty: 18 ML | Refills: 11 | Status: SHIPPED | OUTPATIENT
Start: 2020-02-21 | End: 2020-07-13

## 2020-02-21 RX ORDER — PROMETHAZINE HYDROCHLORIDE AND DEXTROMETHORPHAN HYDROBROMIDE 6.25; 15 MG/5ML; MG/5ML
5 SYRUP ORAL EVERY 8 HOURS PRN
Qty: 180 ML | Refills: 0 | Status: SHIPPED | OUTPATIENT
Start: 2020-02-21 | End: 2020-07-13

## 2020-02-21 RX ORDER — AMOXICILLIN 500 MG/1
1000 CAPSULE ORAL EVERY 12 HOURS
Qty: 40 CAPSULE | Refills: 0 | Status: SHIPPED | OUTPATIENT
Start: 2020-02-21 | End: 2020-03-02

## 2020-02-21 NOTE — PROGRESS NOTES
CHIEF COMPLAINT  Follow-up; Sinus Problem; and Cough      DIAGNOSES, HISTORY, ASSESSMENT, AND PLAN    1. Acute non-recurrent maxillary sinusitis    2. Aphthous stomatitis    3. Screening for colorectal cancer    4. URI with cough and congestion      F/U from 2/12/2020. ONSET of symptoms reported as 2-3 weeks ago. QUALITY of symptoms described primarily as nasal congestion, and sinus pressure/discomfort. ASSOCIATED SYMPTOMS include nonproductive cough. No additional ASSOCIATED SYMPTOMS reported. LOCATION of worst symptoms reported as nasal passages/sinuses. SEVERITY of symptoms described as MODERATE to MODERATELY SEVERE at worst, and MODERATE at present. TIMING of symptoms described as symptoms typically worse at night. EXACERBATING FACTORS appear to include recent frequent commercial air travel. ALLEVIATING FACTORS include the cough medicine, and but the azithromycin seemed to do nothing.     Problem List Items Addressed This Visit        ENT    RESOLVED: Aphthous stomatitis    Current Assessment & Plan     Resolved.           Other Visit Diagnoses     Acute non-recurrent maxillary sinusitis    -  Primary    Relevant Medications    fluticasone propionate (FLONASE) 50 mcg/actuation nasal spray    amoxicillin (AMOXIL) 500 MG capsule    Screening for colorectal cancer        Relevant Orders    Case request GI: COLONOSCOPY (Completed)    URI with cough and congestion        Relevant Medications    promethazine-dextromethorphan (PROMETHAZINE-DM) 6.25-15 mg/5 mL Syrp          MEDICATION MANAGMENT    MEDICATIONS SUMMARY: I have discontinued Vinny Stover's ketoconazole and ((Magic mouthwash) 1:1:1 Benadryl 12.5mg/5ml liq, aluminum & magnesium hydroxide-simehticone (Maalox), lidocaine viscous 2%). I have also changed his promethazine-dextromethorphan. Additionally, I am having him start on fluticasone propionate and amoxicillin.    Outpatient Medications Prior to Visit   Medication Sig Dispense Refill     promethazine-dextromethorphan (PROMETHAZINE-DM) 6.25-15 mg/5 mL Syrp Take 5 mLs by mouth every evening. for 5 days 118 mL 0    (Magic mouthwash) 1:1:1 Benadryl 12.5mg/5ml liq, aluminum & magnesium hydroxide-simehticone (Maalox), lidocaine viscous 2% for mouth sores 480 mL 0    azithromycin (ZITHROMAX Z-MEREDITH) 250 MG tablet Take 2 tablets by mouth on day 1 followed by 1 tablet daily on days 2-5 6 tablet 0    betamethasone valerate 0.1% (VALISONE) 0.1 % Crea Apply topically 2 (two) times daily. (Patient not taking: Reported on 2019) 45 g 1    ketoconazole (NIZORAL) 2 % cream Apply topically 2 (two) times daily. for 21 days 60 g 0    predniSONE (DELTASONE) 20 MG tablet Take 2 tablets by mouth with food for 3 days; then take one tablet with food for 2 days; then take 1/2 tablet with food for 2 days. 9 tablet 0     No facility-administered medications prior to visit.         Medications Discontinued During This Encounter   Medication Reason    betamethasone valerate 0.1% (VALISONE) 0.1 % Crea Patient no longer taking    ketoconazole (NIZORAL) 2 % cream     (Magic mouthwash) 1:1:1 Benadryl 12.5mg/5ml liq, aluminum & magnesium hydroxide-simehticone (Maalox), lidocaine viscous 2%     predniSONE (DELTASONE) 20 MG tablet Patient no longer taking    betamethasone valerate 0.1% (VALISONE) 0.1 % Crea Patient no longer taking    azithromycin (ZITHROMAX Z-MEREDITH) 250 MG tablet Patient no longer taking    promethazine-dextromethorphan (PROMETHAZINE-DM) 6.25-15 mg/5 mL Syrp Reorder        Medications Ordered This Encounter   Medications    amoxicillin (AMOXIL) 500 MG capsule     Sig: Take 2 capsules (1,000 mg total) by mouth every 12 (twelve) hours. for 10 days     Dispense:  40 capsule     Refill:  0    fluticasone propionate (FLONASE) 50 mcg/actuation nasal spray     Si sprays (100 mcg total) by Each Nostril route once daily.     Dispense:  18 mL     Refill:  11    promethazine-dextromethorphan  "(PROMETHAZINE-DM) 6.25-15 mg/5 mL Syrp     Sig: Take 5 mLs by mouth every 8 (eight) hours as needed (for cough).     Dispense:  180 mL     Refill:  0       FOLLOW-UP  Follow up if symptoms worsen or fail to improve.     REVIEW OF SYSTEMS  CONSTITUTIONAL: No objective fever reported.  PULMONARY: No hemoptysis or difficulty breathing reported.  CARDIOVASCULAR: No chest pain or orthopnea reported.     PHYSICAL EXAM  Vitals:    02/21/20 1216   BP: 110/72   BP Location: Left arm   Patient Position: Sitting   BP Method: Large (Manual)   Pulse: 69   Temp: 98 °F (36.7 °C)   TempSrc: Oral   SpO2: 98%   Weight: 83.4 kg (183 lb 13.8 oz)   Height: 5' 5" (1.651 m)     CONSTITUTIONAL: Vital signs noted. No apparent distress. Does not appear acutely ill or septic. Appears adequately hydrated.  EYES: Pupils equal and reactive. Extraocular movements intact. Sclerae anicteric. Lids and conjunctiva unremarkable.  ENT: External ENT grossly unremarkable. Ear canals and visualized tympanic membranes are unremarkable. Hearing grossly intact. Nasal mucosa pink-blue and boggy. Maxillary sinuses mildly tender to percussion. Oropharynx moist. Posterior oropharynx is lightly erythematous, but without ulceration or exudate or significant asymmetry.  NECK: Neck supple without meningismus. Trachea midline. No significant cervical lymphadenopathy.  PULM: Lungs clear. Breathing unlabored.  HEART: Auscultation reveals regular rate and rhythm.  DERM: Skin warm and moist with normal turgor.     Documentation entered by me for this encounter may have been done in part using speech-recognition technology. Although I have made an effort to ensure accuracy, "sound like" errors may exist and should be interpreted in context. -GAL Garcia MD.   "

## 2020-02-21 NOTE — PATIENT INSTRUCTIONS
You should be getting a call soon to schedule a date for your colonoscopy. If you haven't received the call within the next few days, please call [facility]'s endoscopy lab at [phone], and someone there will help you schedule your colonoscopy.

## 2020-02-24 ENCOUNTER — TELEPHONE (OUTPATIENT)
Dept: ENDOSCOPY | Facility: HOSPITAL | Age: 55
End: 2020-02-24

## 2020-02-24 NOTE — TELEPHONE ENCOUNTER
Attempted to schedule procedure, no answer. Left message to return call number provider. [place]

## 2020-02-25 ENCOUNTER — OFFICE VISIT (OUTPATIENT)
Dept: OTOLARYNGOLOGY | Facility: CLINIC | Age: 55
End: 2020-02-25
Payer: COMMERCIAL

## 2020-02-25 ENCOUNTER — CLINICAL SUPPORT (OUTPATIENT)
Dept: AUDIOLOGY | Facility: CLINIC | Age: 55
End: 2020-02-25
Payer: COMMERCIAL

## 2020-02-25 VITALS
WEIGHT: 186.75 LBS | SYSTOLIC BLOOD PRESSURE: 111 MMHG | TEMPERATURE: 98 F | HEART RATE: 72 BPM | DIASTOLIC BLOOD PRESSURE: 77 MMHG | BODY MASS INDEX: 31.07 KG/M2

## 2020-02-25 DIAGNOSIS — H90.6 MIXED HEARING LOSS, BILATERAL: Primary | ICD-10-CM

## 2020-02-25 DIAGNOSIS — H69.93 ETD (EUSTACHIAN TUBE DYSFUNCTION), BILATERAL: ICD-10-CM

## 2020-02-25 DIAGNOSIS — H69.93 ETD (EUSTACHIAN TUBE DYSFUNCTION), BILATERAL: Primary | ICD-10-CM

## 2020-02-25 DIAGNOSIS — H90.6 MIXED HEARING LOSS, BILATERAL: ICD-10-CM

## 2020-02-25 PROCEDURE — 99999 PR PBB SHADOW E&M-EST. PATIENT-LVL III: ICD-10-PCS | Mod: PBBFAC,,, | Performed by: PHYSICIAN ASSISTANT

## 2020-02-25 PROCEDURE — 92557 PR COMPREHENSIVE HEARING TEST: ICD-10-PCS | Mod: S$GLB,,, | Performed by: AUDIOLOGIST-HEARING AID FITTER

## 2020-02-25 PROCEDURE — 92557 COMPREHENSIVE HEARING TEST: CPT | Mod: S$GLB,,, | Performed by: AUDIOLOGIST-HEARING AID FITTER

## 2020-02-25 PROCEDURE — 99214 OFFICE O/P EST MOD 30 MIN: CPT | Mod: S$GLB,,, | Performed by: PHYSICIAN ASSISTANT

## 2020-02-25 PROCEDURE — 92567 TYMPANOMETRY: CPT | Mod: S$GLB,,, | Performed by: AUDIOLOGIST-HEARING AID FITTER

## 2020-02-25 PROCEDURE — 99214 PR OFFICE/OUTPT VISIT, EST, LEVL IV, 30-39 MIN: ICD-10-PCS | Mod: S$GLB,,, | Performed by: PHYSICIAN ASSISTANT

## 2020-02-25 PROCEDURE — 92567 PR TYMPA2METRY: ICD-10-PCS | Mod: S$GLB,,, | Performed by: AUDIOLOGIST-HEARING AID FITTER

## 2020-02-25 PROCEDURE — 99999 PR PBB SHADOW E&M-EST. PATIENT-LVL III: CPT | Mod: PBBFAC,,, | Performed by: PHYSICIAN ASSISTANT

## 2020-02-25 NOTE — PROGRESS NOTES
"Subjective:       Patient ID: Vinny Stover is a 54 y.o. male.    Chief Complaint: Ear Fullness and Hearing Loss    Patient is a very pleasant 54 y.o. gentleman here to see me today for the first time for evaluation of hearing loss and fullness in RIGHT ear for past 10 days.  He has recently been sick with URI and sinus issues since December.  He's currently on Amoxil and Flonase (2/21/2020) but feels as though his RIGHT ear is getting worse.  He denies otalgia but says his right ear feels stuffy like "cotton in there."  Denies tinnitus. Denies any ear drainage.  He denies a family history of hearing loss, and has not had any previous otologic surgery.  He denies any history of significant loud noise exposure. He denies issues with dizziness.  He has had three courses of oral steroids since 12/2019 as well as course of Amoxil, ZIthromax and Doxycycline.  He does not smoke.  No fever.  He has been flying recently and denies any issues w/his ears while on flights.  He was last here in 2018 with Dr. Wilson with [facility].      Review of Systems   Constitutional: Negative for activity change, appetite change and fever.   HENT: Positive for congestion (improving), hearing loss (AD), postnasal drip (improving) and rhinorrhea (improving). Negative for ear discharge, ear pain, sinus pain, sore throat and tinnitus.    Respiratory: Positive for cough (improving). Negative for shortness of breath.    Gastrointestinal:        Reflux   Neurological: Negative for dizziness.       Objective:      Physical Exam   Constitutional: He is oriented to person, place, and time. Vital signs are normal. He appears well-developed and well-nourished. No distress.   HENT:   Head: Normocephalic and atraumatic.   Right Ear: External ear and ear canal normal. No drainage. Tympanic membrane is retracted. Tympanic membrane is not perforated and not erythematous. A middle ear effusion (? scant serous) is present.   Left Ear: Hearing, external ear and ear " canal normal. No drainage. Tympanic membrane is retracted. Tympanic membrane is not injected, not perforated and not erythematous.  No middle ear effusion.   Nose: Mucosal edema present. No rhinorrhea, nasal deformity or septal deviation. Right sinus exhibits no maxillary sinus tenderness and no frontal sinus tenderness. Left sinus exhibits no maxillary sinus tenderness and no frontal sinus tenderness.   Mouth/Throat: Uvula is midline, oropharynx is clear and moist and mucous membranes are normal. No trismus in the jaw. Normal dentition. No uvula swelling. No oropharyngeal exudate or posterior oropharyngeal edema.   Eyes: Pupils are equal, round, and reactive to light. Conjunctivae and EOM are normal. Right eye exhibits no chemosis. Left eye exhibits no chemosis. Right conjunctiva is not injected. Left conjunctiva is not injected. No scleral icterus.   Neck: Trachea normal and phonation normal. No tracheal tenderness present. No tracheal deviation present. No thyroid mass and no thyromegaly present.   Cardiovascular: Intact distal pulses.   Pulmonary/Chest: Effort normal. No accessory muscle usage or stridor. No respiratory distress.   Lymphadenopathy:        Head (right side): No submental, no submandibular, no preauricular and no posterior auricular adenopathy present.        Head (left side): No submental, no submandibular, no preauricular and no posterior auricular adenopathy present.     He has no cervical adenopathy.        Right cervical: No superficial cervical and no deep cervical adenopathy present.       Left cervical: No superficial cervical and no deep cervical adenopathy present.   Neurological: He is alert and oriented to person, place, and time. No cranial nerve deficit.   Skin: Skin is warm and dry. No rash noted. No erythema.   Psychiatric: He has a normal mood and affect. His behavior is normal. Thought content normal.         AUDIOGRAM:          Assessment:       1. ETD (Eustachian tube  dysfunction), bilateral    2. Mixed hearing loss, bilateral        Plan:         Reviewed results of today's audiogram with patient.  We had a long discussion regarding the anatomy and function of the eustachian tube.  We discussed that the eustachian tube acts as a pump to keep the appropriate amount of pressure behind the ear drum.  I recommend continued use of his nasal steroid spray (Flonase) to be used on a daily basis, and we discussed that it will take 2-3 weeks of daily use to achieve maximal effectiveness.  I would not recommend additional oral steroids at this time as he's had 3 courses in past 3 months.  Continue oral Amoxil as prescribed.  Discussed that he appears to have scant KERMIT in right ear today although his audiogram shows mixed loss AU.  If KERMIT does not clear with Amoxil and consistent Flonase, could consider placement of tube in that ear.  Recommend RTC in 3-4 weeks if no improvement.

## 2020-02-25 NOTE — PROGRESS NOTES
"Referring Provider:Ledy Solano PA-C    Vinny Stover was seen 02/25/2020 for an audiological evaluation.  Patient complains of hearing loss and fullness in RIGHT ear for past 10 days.  He has recently been sick with URI and sinus issues since December.  He's currently on Amoxil and Flonase (2/21/2020) but feels as though his RIGHT ear is getting worse.  He denies otalgia but says his right ear feels stuffy like "cotton in there."  Denies tinnitus. Denies any ear drainage.  He denies a family history of hearing loss, and has not had any previous otologic surgery.  He denies any history of significant loud noise exposure. He denies issues with dizziness.  He has had three courses of oral steroids since 12/2019 as well as course of Amoxil, ZIthromax and Doxycycline.  He does not smoke.  No fever.  He has been flying recently and denies any issues w/his ears while on flights.  He was last here in 2018 with Dr. Wilson with [facility].    Results reveal a mild-to-moderate mixed hearing loss 250-8000 Hz for the right ear, and a mild-to-moderate mixed hearing loss 250-8000 Hz for the left ear.   Speech Reception Thresholds were  25 dBHL for the right ear and 25 dBHL for the left ear.   Word recognition scores were excellent for the right ear and excellent for the left ear.   Tympanograms were Type C, abnormal for the right ear and Type C, abnormal for the left ear.    Patient was counseled on the above findings.    Recommendations:  1. ENT: Bilateral ETD and mixed hearing loss.          "

## 2020-04-08 ENCOUNTER — PATIENT MESSAGE (OUTPATIENT)
Dept: INTERNAL MEDICINE | Facility: CLINIC | Age: 55
End: 2020-04-08

## 2020-04-09 RX ORDER — CYCLOBENZAPRINE HCL 10 MG
10 TABLET ORAL NIGHTLY
Qty: 30 TABLET | Refills: 0 | Status: SHIPPED | OUTPATIENT
Start: 2020-04-09 | End: 2020-04-19

## 2020-07-13 ENCOUNTER — HOSPITAL ENCOUNTER (OUTPATIENT)
Dept: RADIOLOGY | Facility: HOSPITAL | Age: 55
Discharge: HOME | End: 2020-07-13
Attending: PHYSICIAN ASSISTANT
Payer: COMMERCIAL

## 2020-07-13 ENCOUNTER — OFFICE VISIT (OUTPATIENT)
Dept: INTERNAL MEDICINE | Facility: CLINIC | Age: 55
End: 2020-07-13
Payer: COMMERCIAL

## 2020-07-13 VITALS
HEART RATE: 82 BPM | BODY MASS INDEX: 31.33 KG/M2 | SYSTOLIC BLOOD PRESSURE: 104 MMHG | DIASTOLIC BLOOD PRESSURE: 76 MMHG | WEIGHT: 188.06 LBS | HEIGHT: 65 IN | TEMPERATURE: 99 F | OXYGEN SATURATION: 98 %

## 2020-07-13 DIAGNOSIS — M54.9 DORSALGIA, UNSPECIFIED: ICD-10-CM

## 2020-07-13 DIAGNOSIS — M47.817 SPONDYLOSIS WITHOUT MYELOPATHY OR RADICULOPATHY, LUMBOSACRAL REGION: ICD-10-CM

## 2020-07-13 PROCEDURE — 99999 PR PBB SHADOW E&M-EST. PATIENT-LVL IV: ICD-10-PCS | Mod: PBBFAC,,, | Performed by: PHYSICIAN ASSISTANT

## 2020-07-13 PROCEDURE — 72110 X-RAY EXAM L-2 SPINE 4/>VWS: CPT | Mod: 26,,, | Performed by: RADIOLOGY

## 2020-07-13 PROCEDURE — 99214 PR OFFICE/OUTPT VISIT, EST, LEVL IV, 30-39 MIN: ICD-10-PCS | Mod: S$GLB,,, | Performed by: PHYSICIAN ASSISTANT

## 2020-07-13 PROCEDURE — 72110 XR LUMBAR SPINE COMPLETE 5 VIEW: ICD-10-PCS | Mod: 26,,, | Performed by: RADIOLOGY

## 2020-07-13 PROCEDURE — 72110 X-RAY EXAM L-2 SPINE 4/>VWS: CPT | Mod: TC

## 2020-07-13 PROCEDURE — 99214 OFFICE O/P EST MOD 30 MIN: CPT | Mod: S$GLB,,, | Performed by: PHYSICIAN ASSISTANT

## 2020-07-13 PROCEDURE — 99999 PR PBB SHADOW E&M-EST. PATIENT-LVL IV: CPT | Mod: PBBFAC,,, | Performed by: PHYSICIAN ASSISTANT

## 2020-07-13 RX ORDER — CYCLOBENZAPRINE HCL 10 MG
10 TABLET ORAL NIGHTLY
Qty: 30 TABLET | Refills: 0 | Status: SHIPPED | OUTPATIENT
Start: 2020-07-13 | End: 2020-08-12

## 2020-07-13 RX ORDER — MELOXICAM 15 MG/1
TABLET ORAL
Qty: 30 TABLET | Refills: 0 | Status: SHIPPED | OUTPATIENT
Start: 2020-07-13 | End: 2020-08-06

## 2020-07-13 RX ORDER — CYCLOBENZAPRINE HCL 10 MG
10 TABLET ORAL 3 TIMES DAILY PRN
COMMUNITY
End: 2020-07-13 | Stop reason: SDUPTHER

## 2020-07-13 NOTE — PATIENT INSTRUCTIONS
Back Pain (Acute or Chronic)    Back pain is one of the most common problems. The good news is that most people feel better in 1 to 2 weeks, and most of the rest in 1 to 2 months. Most people can remain active.  People experience and describe pain differently; not everyone is the same.  · The pain can be sharp, stabbing, shooting, aching, cramping or burning.  · Movement, standing, bending, lifting, sitting, or walking may worsen pain.  · It can be localized to one spot or area, or it can be more generalized.  · It can spread or radiate upwards, to the front, or go down your arms or legs (sciatica).  · It can cause muscle spasm.  Most of the time, mechanical problems with the muscles or spine cause the pain. Mechanical problems are usually caused by an injury to the muscles or ligaments. While illness can cause back pain, it is usually not caused by a serious illness. Mechanical problems include:   · Physical activity such as sports, exercise, work, or normal activity  · Overexertion, lifting, pushing, pulling incorrectly or too aggressively  · Sudden twisting, bending, or stretching from an accident, or accidental movement  · Poor posture  · Stretching or moving wrong, without noticing pain at the time  · Poor coordination, lack of regular exercise (check with your doctor about this)  · Spinal disc disease or arthritis  · Stress  Pain can also be related to pregnancy, or illness like appendicitis, bladder or kidney infections, pelvic infections, and many other things.  Acute back pain usually gets better in 1 to 2 weeks. Back pain related to disk disease, arthritis in the spinal joints or spinal stenosis (narrowing of the spinal canal) can become chronic and last for months or years.  Unless you had a physical injury (for example, a car accident or fall) X-rays are usually not needed for the initial evaluation of back pain. If pain continues and does not respond to medical treatment, X-rays and other tests may be  needed.  Home care  Try these home care recommendations:  · When in bed, try to find a position of comfort. A firm mattress is best. Try lying flat on your back with pillows under your knees. You can also try lying on your side with your knees bent up towards your chest and a pillow between your knees.  · At first, do not try to stretch out the sore spots. If there is a strain, it is not like the good soreness you get after exercising without an injury. In this case, stretching may make it worse.  · Avoid prolong sitting, long car rides, or travel. This puts more stress on the lower back than standing or walking.  · During the first 24 to 72 hours after an acute injury or flare up of chronic back pain, apply an ice pack to the painful area for 20 minutes and then remove it for 20 minutes. Do this over a period of 60 to 90 minutes or several times a day. This will reduce swelling and pain. Wrap the ice pack in a thin towel or plastic to protect your skin.  · You can start with ice, then switch to heat. Heat (hot shower, hot bath, or heating pad) reduces pain and works well for muscle spasms. Heat can be applied to the painful area for 20 minutes then remove it for 20 minutes. Do this over a period of 60 to 90 minutes or several times a day. Do not sleep on a heating pad. It can lead to skin burns or tissue damage.  · You can alternate ice and heat therapy. Talk with your doctor about the best treatment for your back pain.  · Therapeutic massage can help relax the back muscles without stretching them.  · Be aware of safe lifting methods and do not lift anything without stretching first.  Medicines  Talk to your doctor before using medicine, especially if you have other medical problems or are taking other medicines.  · You may use over-the-counter medicine as directed on the bottle to control pain, unless another pain medicine was prescribed. If you have chronic conditions like diabetes, liver or kidney disease,  stomach ulcers, or gastrointestinal bleeding, or are taking blood thinners, talk to your doctor before taking any medicine.  · Be careful if you are given a prescription medicines, narcotics, or medicine for muscle spasms. They can cause drowsiness, affect your coordination, reflexes, and judgement. Do not drive or operate heavy machinery.  Follow-up care  Follow up with your healthcare provider, or as advised.   A radiologist will review any X-rays that were taken. Your provide will notify you of any new findings that may affect your care.  Call 911  Call emergency services if any of the following occur:  · Trouble breathing  · Confusion  · Very drowsy or trouble awakening  · Fainting or loss of consciousness  · Rapid or very slow heart rate  · Loss of bowel or bladder control  When to seek medical advice  Call your healthcare provider right away if any of these occur:   · Pain becomes worse or spreads to your legs  · Weakness or numbness in one or both legs  · Numbness in the groin or genital area  Date Last Reviewed: 7/1/2016 © 2000-2017 [employer]. [address] [place] [address]. All rights reserved. This information is not intended as a substitute for professional medical care. Always follow your healthcare professional's instructions.        Back Care Tips    Caring for your back  These are things you can do to prevent a recurrence of acute back pain and to reduce symptoms from chronic back pain:  · Maintain a healthy weight. If you are overweight, losing weight will help most types of back pain.  · Exercise is an important part of recovery from most types of back pain. The muscles behind and in front of the spine support the back. This means strengthening both the back muscles and the abdominal muscles will provide better support for your spine.   · Swimming and brisk walking are good overall exercises to improve your fitness level.  · Practice safe lifting methods  (below).  · Practice good posture when sitting, standing and walking. Avoid prolonged sitting. This puts more stress on the lower back than standing or walking.  · Wear quality shoes with sufficient arch support. Foot and ankle alignment can affect back symptoms. Women should avoid wearing high heels.  · Therapeutic massage can help relax the back muscles without stretching them.  · During the first 24 to 72 hours after an acute injury or flare-up of chronic back pain, apply an ice pack to the painful area for 20 minutes and then remove it for 20 minutes, over a period of 60 to 90 minutes, or several times a day. As a safety precaution, do not use a heating pad at bedtime. Sleeping on a heating pad can lead to skin burns or tissue damage.  · You can alternate ice and heat therapies.  Medications  Talk to your healthcare provider before using medicines, especially if you have other medical problems or are taking other medicines.  · You may use acetaminophen or ibuprofen to control pain, unless your healthcare provider prescribed other pain medicine. If you have chronic conditions like diabetes, liver or kidney disease, stomach ulcers, or gastrointestinal bleeding, or are taking blood thinners, talk with your healthcare provider before taking any medicines.  · Be careful if you are given prescription pain medicines, narcotics, or medicine for muscle spasm. They can cause drowsiness, affect your coordination, reflexes, and judgment. Do not drive or operate heavy machinery while taking these types of medicines. Take prescription pain medicine only as prescribed by your healthcare provider.  Lumbar stretch  Here is a simple stretching exercise that will help relax muscle spasm and keep your back more limber. If exercise makes your back pain worse, dont do it.  · Lie on your back with your knees bent and both feet on the ground.  · Slowly raise your left knee to your chest as you flatten your lower back against the  floor. Hold for 5 seconds.  · Relax and repeat the exercise with your right knee.  · Do 10 of these exercises for each leg.  Safe lifting method  · Dont bend over at the waist to lift an object off the floor.  Instead, bend your knees and hips in a squat.   · Keep your back and head upright  · Hold the object close to your body, directly in front of you.  · Straighten your legs to lift the object.   · Lower the object to the floor in the reverse fashion.  · If you must slide something across the floor, push it.  Posture tips  Sitting  Sit in chairs with straight backs or low-back support. Keep your knees lower than your hips, with your feet flat on the floor.  When driving, sit up straight. Adjust the seat forward so you are not leaning toward the steering wheel.  A small pillow or rolled towel behind your lower back may help if you are driving long distances.   Standing  When standing for long periods, shift most of your weight to one leg at a time. Alternate legs every few minutes.   Sleeping  The best way to sleep is on your side with your knees bent. Put a low pillow under your head to support your neck in a neutral spine position. Avoid thick pillows that bend your neck to one side. Put a pillow between your legs to further relax your lower back. If you sleep on your back, put pillows under your knees to support your legs in a slightly flexed position. Use a firm mattress. If your mattress sags, replace it, or use a 1/2-inch plywood board under the mattress to add support.  Follow-up care  Follow up with your healthcare provider, or as advised.  If X-rays, a CT scan or an MRI scan were taken, they will be reviewed by a radiologist. You will be notified of any new findings that may affect your care.  Call 911  Seek emergency medical care if any of the following occur:  · Trouble breathing  · Confusion  · Very drowsy  · Fainting or loss of consciousness  · Rapid or very slow heart rate  · Loss of  bowel or  bladder control  When to seek medical care  Call your healthcare provider if any of the following occur:  · Pain becomes worse or spreads to your arms or legs  · Weakness or numbness in one or both arms or legs  · Numbness in the groin area  Date Last Reviewed: 6/1/2016 © 2000-2017 [employer]. [address] [place] [address], [place] [address]. All rights reserved. This information is not intended as a substitute for professional medical care. Always follow your healthcare professional's instructions.        Relieving Back Pain  Back pain is a common problem. You can strain back muscles by lifting too much weight or just by moving the wrong way. Back strain can be uncomfortable, even painful. And it can take weeks or months to improve. To help yourself feel better and prevent future back strains, try these tips.  Important Note: Do not give aspirin to children or teens without first discussing it with your healthcare provider.      ? Ice    Ice reduces muscle pain and swelling. It helps most during the first 24 to 48 hours after an injury.  · Wrap an ice pack or a bag of frozen peas in a thin towel. (Never place ice directly on your skin.)  · Place the ice where your back hurts the most.  · Dont ice for more than 20 minutes at a time.  · You can use ice several times a day.  ? Medicines  Over-the-counter pain relievers can include acetaminophen and anti-inflammatory medicines, which includes aspirin or ibuprofen. They can help ease discomfort. Some also reduce swelling.  · Tell your healthcare provider about any medicines you are already taking.  · Take medicines only as directed.  ? Heat  After the first 48 hours, heat can relax sore muscles and improve blood flow.  · Try a warm bath or shower. Or use a heating pad set on low. To prevent a burn, keep a cloth between you and the heating pad.  · Dont use a heating pad for more than 15 minutes at a time. Never sleep on a heating pad.  Date Last Reviewed:  9/1/2015  © 3564-3183 [employer]. [address] [place] [address]. All rights reserved. This information is not intended as a substitute for professional medical care. Always follow your healthcare professional's instructions.        Relieving Tension in Your Back  Being relaxed helps keep your mind healthy and your back ready to move. Take short breaks often. Walk around. Stretch. Switch tasks. Also give the following a try.  Make time to relax. Start by setting aside 5 minutes daily.   Deep breathing    Deep breathing is a simple way to reduce stress. You can do it almost any time you need to relax.  · Inhale slowly through your nose. Let your lungs and stomach expand.  · Hold your breath for 2 to 3 seconds.  · Exhale slowly through your mouth until your lungs feel empty. Repeat 3 to 4 times.  Relieve tension  Muscle tension can create tender spots called trigger points. The tips below may help relieve muscle tension.  · Press the trigger point if you can reach it. If not, lie on a soft tennis ball, or ask a friend to press the spot. Use steady pressure for 10 to 15 seconds. Breathe deeply. Repeat a few times.  · Massage trigger points with ice for 2 to 5 minutes. Press lightly at first. Slowly increase firmness.  Date Last Reviewed: 10/18/2015  © 0280-9985 [employer]. [address] [place] [address]. All rights reserved. This information is not intended as a substitute for professional medical care. Always follow your healthcare professional's instructions.        Back Safety: Basics of Good Posture  Good posture helps protect you from injury. It also increases your comfort. Aim for good posture throughout the day.    Check your posture  The human body works best when it is properly aligned. To improve your standing posture, follow these steps:  · Take a moment to close your eyes and feel your body. Then breathe deeply and relax your shoulders, hips, and  knees.  · Now, from the very top of your head, lift up just a bit. Think of a line linking your ears, shoulders, hips, and ankles. Adjust your body to follow the line. You may need to relax your hips and tuck your buttocks under a bit.  · Next, take a look at yourself in a mirror. Is one ear, shoulder, or hip higher than the other? They should be level.  Check how you sit  When you sit properly, pressure on your back is reduced. Try these steps:  · Sit so that the curve of your lower back fits easily against the chair. Keep your gaze level.  · Support your feet. They should be flat on the floor or on a footrest. Your knees should be level with your hips.  · Adjust the chair height as needed. Sit so your forearms are level with the work surface.  Proper posture helps  When your back is aligned, its more likely to stay safe throughout the day.  · Standing in place. Rest one foot on a stool or low box to ease pressure on your lower back. Switch feet often. If you can, adjust the height of your work surface so your neck and shoulders arent under strain.  · Driving. Sit close enough to the steering wheel to keep your knees slightly bent. For comfort, your knees should be level with your hips or just a bit lower. Sit as straight as you can. The curve of your lower back should be fully supported.  · Walking. Stand tall and walk with your head up. Let your arms swing while you walk. This helps relax muscles. Wear shoes that fit and support your feet. If you will be standing or walking for a long time, dont wear high heels.  · Sitting and sleeping. Choose your furniture with care. Make sure its not causing or increasing your back pain. Chairs should allow for comfortable, correct sitting posture. Use pillows for added support if needed. Your bed should support your backs natural curves without being too hard or too soft.  Date Last Reviewed: 10/18/2015  © 4209-2019 [employer]. [address] [facility],  [place] [address]. All rights reserved. This information is not intended as a substitute for professional medical care. Always follow your healthcare professional's instructions.        Causes of Lumbar (Low Back) Pain  Low back pain can be caused by problems with any part of the lumbar spine. A disk can herniate (push out) and press on a nerve. Vertebrae can rub against each other or slip out of place. This can irritate facet joints and nerves. It can also lead to stenosis, a narrowing of the spinal canal or foramen.  Pressure from a disk  Constant wear and tear on a disk can cause it to weaken and push outward. Part of the disk may then press on nearby nerves. There are two common types of herniated disks:  Contained means the soft nucleus is protruding outward.   Extruded means the firm annulus has torn, letting the soft center squeeze through.     Pressure from bone  An unstable spine   With age, a disk may thin and wear out. Vertebrae above and below the disk may begin to touch. This can put pressure on nerves. It can also cause bone spurs (growths) to form where the bones rub together.    Stenosis results when bone spurs narrow the foramen or spinal canal. This also puts pressure on nerves. Slipping vertebrae can irritate nerves and joints. They can also worsen stenosis.    In some cases, vertebrae become unstable and slip forward. This is called spondylolisthesis.     Date Last Reviewed: 10/12/2015  © 1628-2351 [employer]. [address] [place] [address], [place] [address]. All rights reserved. This information is not intended as a substitute for professional medical care. Always follow your healthcare professional's instructions.        Back Care Tips    Caring for your back  These are things you can do to prevent a recurrence of acute back pain and to reduce symptoms from chronic back pain:  · Maintain a healthy weight. If you are overweight, losing weight will help most types of back  pain.  · Exercise is an important part of recovery from most types of back pain. The muscles behind and in front of the spine support the back. This means strengthening both the back muscles and the abdominal muscles will provide better support for your spine.   · Swimming and brisk walking are good overall exercises to improve your fitness level.  · Practice safe lifting methods (below).  · Practice good posture when sitting, standing and walking. Avoid prolonged sitting. This puts more stress on the lower back than standing or walking.  · Wear quality shoes with sufficient arch support. Foot and ankle alignment can affect back symptoms. Women should avoid wearing high heels.  · Therapeutic massage can help relax the back muscles without stretching them.  · During the first 24 to 72 hours after an acute injury or flare-up of chronic back pain, apply an ice pack to the painful area for 20 minutes and then remove it for 20 minutes, over a period of 60 to 90 minutes, or several times a day. As a safety precaution, do not use a heating pad at bedtime. Sleeping on a heating pad can lead to skin burns or tissue damage.  · You can alternate ice and heat therapies.  Medications  Talk to your healthcare provider before using medicines, especially if you have other medical problems or are taking other medicines.  · You may use acetaminophen or ibuprofen to control pain, unless your healthcare provider prescribed other pain medicine. If you have chronic conditions like diabetes, liver or kidney disease, stomach ulcers, or gastrointestinal bleeding, or are taking blood thinners, talk with your healthcare provider before taking any medicines.  · Be careful if you are given prescription pain medicines, narcotics, or medicine for muscle spasm. They can cause drowsiness, affect your coordination, reflexes, and judgment. Do not drive or operate heavy machinery while taking these types of medicines. Take prescription pain medicine  only as prescribed by your healthcare provider.  Lumbar stretch  Here is a simple stretching exercise that will help relax muscle spasm and keep your back more limber. If exercise makes your back pain worse, dont do it.  · Lie on your back with your knees bent and both feet on the ground.  · Slowly raise your left knee to your chest as you flatten your lower back against the floor. Hold for 5 seconds.  · Relax and repeat the exercise with your right knee.  · Do 10 of these exercises for each leg.  Safe lifting method  · Dont bend over at the waist to lift an object off the floor.  Instead, bend your knees and hips in a squat.   · Keep your back and head upright  · Hold the object close to your body, directly in front of you.  · Straighten your legs to lift the object.   · Lower the object to the floor in the reverse fashion.  · If you must slide something across the floor, push it.  Posture tips  Sitting  Sit in chairs with straight backs or low-back support. Keep your knees lower than your hips, with your feet flat on the floor.  When driving, sit up straight. Adjust the seat forward so you are not leaning toward the steering wheel.  A small pillow or rolled towel behind your lower back may help if you are driving long distances.   Standing  When standing for long periods, shift most of your weight to one leg at a time. Alternate legs every few minutes.   Sleeping  The best way to sleep is on your side with your knees bent. Put a low pillow under your head to support your neck in a neutral spine position. Avoid thick pillows that bend your neck to one side. Put a pillow between your legs to further relax your lower back. If you sleep on your back, put pillows under your knees to support your legs in a slightly flexed position. Use a firm mattress. If your mattress sags, replace it, or use a 1/2-inch plywood board under the mattress to add support.  Follow-up care  Follow up with your healthcare provider, or as  advised.  If X-rays, a CT scan or an MRI scan were taken, they will be reviewed by a radiologist. You will be notified of any new findings that may affect your care.  Call 911  Seek emergency medical care if any of the following occur:  · Trouble breathing  · Confusion  · Very drowsy  · Fainting or loss of consciousness  · Rapid or very slow heart rate  · Loss of  bowel or bladder control  When to seek medical care  Call your healthcare provider if any of the following occur:  · Pain becomes worse or spreads to your arms or legs  · Weakness or numbness in one or both arms or legs  · Numbness in the groin area  Date Last Reviewed: 6/1/2016 © 2000-2017 [employer]. [address] [place] [address], [place] [address]. All rights reserved. This information is not intended as a substitute for professional medical care. Always follow your healthcare professional's instructions.        Back Basics: A Healthy Spine  A healthy spine supports the body while letting it move freely. It does this with the help of three natural curves. Strong, flexible muscles help, too. They support the spine by keeping its curves properly aligned. The disks that cushion the bones of your spine also play a role in back fitness.    Three natural curves  The spine is made of bones (vertebrae) and pads of soft tissue (disks). These parts are arranged in three curves: cervical, thoracic, and lumbar. When properly aligned, these curves keep your body balanced. They also support your body when you move. By distributing your weight throughout your spine, the curves make back injuries less likely.  Strong, flexible muscles  Strong, flexible back muscles help support the three curves of the spine. They do so by holding the vertebrae and disks in proper alignment. Strong, flexible abdominal, hip, and leg muscles also reduce strain on the back.  The lumbar curve  The lumbar curve is the hardest-working part of the spine. It carries more weight and  moves the most. Aligning this curve helps prevent damage to vertebrae, disks, and other parts of the spine.  Cushioning disks  Disks are the soft pads of tissue between the vertebrae. The disks absorb shock caused by movement. Each disk has a spongy center (nucleus) and a tougher outer ring (annulus). Movement within the nucleus allows the vertebrae to rock back and forth on the disks. This provides the flexibility needed to bend and move.       Date Last Reviewed: 10/18/2015  © 8877-8305 [employer]. [address] [place] [address]. All rights reserved. This information is not intended as a substitute for professional medical care. Always follow your healthcare professional's instructions.        Back Safety: Basics of Good Posture  Good posture helps protect you from injury. It also increases your comfort. Aim for good posture throughout the day.    Check your posture  The human body works best when it is properly aligned. To improve your standing posture, follow these steps:  · Take a moment to close your eyes and feel your body. Then breathe deeply and relax your shoulders, hips, and knees.  · Now, from the very top of your head, lift up just a bit. Think of a line linking your ears, shoulders, hips, and ankles. Adjust your body to follow the line. You may need to relax your hips and tuck your buttocks under a bit.  · Next, take a look at yourself in a mirror. Is one ear, shoulder, or hip higher than the other? They should be level.  Check how you sit  When you sit properly, pressure on your back is reduced. Try these steps:  · Sit so that the curve of your lower back fits easily against the chair. Keep your gaze level.  · Support your feet. They should be flat on the floor or on a footrest. Your knees should be level with your hips.  · Adjust the chair height as needed. Sit so your forearms are level with the work surface.  Proper posture helps  When your back is aligned, its more  likely to stay safe throughout the day.  · Standing in place. Rest one foot on a stool or low box to ease pressure on your lower back. Switch feet often. If you can, adjust the height of your work surface so your neck and shoulders arent under strain.  · Driving. Sit close enough to the steering wheel to keep your knees slightly bent. For comfort, your knees should be level with your hips or just a bit lower. Sit as straight as you can. The curve of your lower back should be fully supported.  · Walking. Stand tall and walk with your head up. Let your arms swing while you walk. This helps relax muscles. Wear shoes that fit and support your feet. If you will be standing or walking for a long time, dont wear high heels.  · Sitting and sleeping. Choose your furniture with care. Make sure its not causing or increasing your back pain. Chairs should allow for comfortable, correct sitting posture. Use pillows for added support if needed. Your bed should support your backs natural curves without being too hard or too soft.  Date Last Reviewed: 10/18/2015  © 1647-4101 [employer]. [address] [place] [address]. All rights reserved. This information is not intended as a substitute for professional medical care. Always follow your healthcare professional's instructions.        Back Safety: Basics of Good Posture  Good posture helps protect you from injury. It also increases your comfort. Aim for good posture throughout the day.    Check your posture  The human body works best when it is properly aligned. To improve your standing posture, follow these steps:  · Take a moment to close your eyes and feel your body. Then breathe deeply and relax your shoulders, hips, and knees.  · Now, from the very top of your head, lift up just a bit. Think of a line linking your ears, shoulders, hips, and ankles. Adjust your body to follow the line. You may need to relax your hips and tuck your buttocks under a  bit.  · Next, take a look at yourself in a mirror. Is one ear, shoulder, or hip higher than the other? They should be level.  Check how you sit  When you sit properly, pressure on your back is reduced. Try these steps:  · Sit so that the curve of your lower back fits easily against the chair. Keep your gaze level.  · Support your feet. They should be flat on the floor or on a footrest. Your knees should be level with your hips.  · Adjust the chair height as needed. Sit so your forearms are level with the work surface.  Proper posture helps  When your back is aligned, its more likely to stay safe throughout the day.  · Standing in place. Rest one foot on a stool or low box to ease pressure on your lower back. Switch feet often. If you can, adjust the height of your work surface so your neck and shoulders arent under strain.  · Driving. Sit close enough to the steering wheel to keep your knees slightly bent. For comfort, your knees should be level with your hips or just a bit lower. Sit as straight as you can. The curve of your lower back should be fully supported.  · Walking. Stand tall and walk with your head up. Let your arms swing while you walk. This helps relax muscles. Wear shoes that fit and support your feet. If you will be standing or walking for a long time, dont wear high heels.  · Sitting and sleeping. Choose your furniture with care. Make sure its not causing or increasing your back pain. Chairs should allow for comfortable, correct sitting posture. Use pillows for added support if needed. Your bed should support your backs natural curves without being too hard or too soft.  Date Last Reviewed: 10/18/2015  © 6711-1752 [employer]. [address] [place] [address], [place] [address]. All rights reserved. This information is not intended as a substitute for professional medical care. Always follow your healthcare professional's instructions.        Back Safety: Basics of Good Posture  Good posture  helps protect you from injury. It also increases your comfort. Aim for good posture throughout the day.    Check your posture  The human body works best when it is properly aligned. To improve your standing posture, follow these steps:  · Take a moment to close your eyes and feel your body. Then breathe deeply and relax your shoulders, hips, and knees.  · Now, from the very top of your head, lift up just a bit. Think of a line linking your ears, shoulders, hips, and ankles. Adjust your body to follow the line. You may need to relax your hips and tuck your buttocks under a bit.  · Next, take a look at yourself in a mirror. Is one ear, shoulder, or hip higher than the other? They should be level.  Check how you sit  When you sit properly, pressure on your back is reduced. Try these steps:  · Sit so that the curve of your lower back fits easily against the chair. Keep your gaze level.  · Support your feet. They should be flat on the floor or on a footrest. Your knees should be level with your hips.  · Adjust the chair height as needed. Sit so your forearms are level with the work surface.  Proper posture helps  When your back is aligned, its more likely to stay safe throughout the day.  · Standing in place. Rest one foot on a stool or low box to ease pressure on your lower back. Switch feet often. If you can, adjust the height of your work surface so your neck and shoulders arent under strain.  · Driving. Sit close enough to the steering wheel to keep your knees slightly bent. For comfort, your knees should be level with your hips or just a bit lower. Sit as straight as you can. The curve of your lower back should be fully supported.  · Walking. Stand tall and walk with your head up. Let your arms swing while you walk. This helps relax muscles. Wear shoes that fit and support your feet. If you will be standing or walking for a long time, dont wear high heels.  · Sitting and sleeping. Choose your furniture with care.  Make sure its not causing or increasing your back pain. Chairs should allow for comfortable, correct sitting posture. Use pillows for added support if needed. Your bed should support your backs natural curves without being too hard or too soft.  Date Last Reviewed: 10/18/2015  © 9567-1527 [employer]. [address] [place] [address], [place] [address]. All rights reserved. This information is not intended as a substitute for professional medical care. Always follow your healthcare professional's instructions.

## 2020-07-13 NOTE — PROGRESS NOTES
Subjective:      Patient ID: Vinny Stover is a 55 y.o. male.    Chief Complaint: Back Pain (lower x 5 days)    Low-back Pain  This is a recurrent problem. The current episode started in the past 7 days. The problem occurs constantly. The problem has been gradually worsening. Pertinent negatives include no abdominal pain, anorexia, arthralgias, change in bowel habit, chest pain, chills, congestion, coughing, diaphoresis, fatigue, fever, headaches, joint swelling, myalgias, nausea, neck pain, numbness, rash, sore throat, swollen glands, urinary symptoms, vertigo, visual change, vomiting or weakness. The symptoms are aggravated by standing and walking. He has tried rest, heat, ice and NSAIDs (muscle relaxer) for the symptoms. The treatment provided mild relief.     Patient Active Problem List   Diagnosis    Class 1 obesity due to excess calories without serious comorbidity with body mass index (BMI) of 30.0 to 30.9 in adult    Pre-diabetes    Obstructive sleep apnea syndrome         Review of Systems   Constitutional: Negative for activity change, appetite change, chills, diaphoresis, fatigue, fever and unexpected weight change.   HENT: Negative.  Negative for congestion, hearing loss, postnasal drip, rhinorrhea, sore throat, trouble swallowing and voice change.    Eyes: Negative.  Negative for visual disturbance.   Respiratory: Negative.  Negative for cough, choking, chest tightness and shortness of breath.    Cardiovascular: Negative for chest pain, palpitations and leg swelling.   Gastrointestinal: Negative for abdominal distention, abdominal pain, anorexia, blood in stool, change in bowel habit, constipation, diarrhea, nausea and vomiting.   Endocrine: Negative for cold intolerance, heat intolerance, polydipsia and polyuria.   Genitourinary: Negative.  Negative for difficulty urinating, dysuria, frequency, genital sores, hematuria, penile pain and urgency.   Musculoskeletal: Positive for back pain.  "Negative for arthralgias, gait problem, joint swelling, myalgias, neck pain and neck stiffness.   Skin: Negative for color change, pallor, rash and wound.   Neurological: Negative for dizziness, vertigo, tremors, weakness, light-headedness, numbness and headaches.   Hematological: Negative for adenopathy.   Psychiatric/Behavioral: Negative for behavioral problems, confusion, self-injury, sleep disturbance and suicidal ideas. The patient is not nervous/anxious.      Objective:   /76 (BP Location: Right arm, Patient Position: Sitting, BP Method: Large (Manual))   Pulse 82   Temp 98.5 °F (36.9 °C) (Tympanic)   Ht 5' 5" (1.651 m)   Wt 85.3 kg (188 lb 0.8 oz)   SpO2 98%   BMI 31.29 kg/m²     Physical Exam  Vitals signs reviewed.   Constitutional:       Appearance: He is well-developed.   HENT:      Head: Normocephalic and atraumatic.      Right Ear: External ear normal.      Left Ear: External ear normal.      Nose: Nose normal.   Eyes:      Conjunctiva/sclera: Conjunctivae normal.      Pupils: Pupils are equal, round, and reactive to light.   Neck:      Musculoskeletal: Normal range of motion and neck supple.   Cardiovascular:      Rate and Rhythm: Normal rate and regular rhythm.      Heart sounds: Normal heart sounds. No murmur. No friction rub. No gallop.    Pulmonary:      Effort: Pulmonary effort is normal. No respiratory distress.      Breath sounds: Normal breath sounds. No wheezing or rales.   Chest:      Chest wall: No tenderness.   Abdominal:      General: There is no distension.      Palpations: Abdomen is soft.      Tenderness: There is no abdominal tenderness.   Musculoskeletal: Normal range of motion.      Right hip: Normal. He exhibits normal range of motion, normal strength, no tenderness and no bony tenderness.      Left hip: He exhibits normal range of motion, normal strength and no tenderness.      Lumbar back: He exhibits tenderness, pain and spasm. He exhibits normal range of motion, no " bony tenderness, no swelling, no edema, no deformity, no laceration and normal pulse.        Back:    Lymphadenopathy:      Cervical: No cervical adenopathy.   Skin:     General: Skin is warm and dry.   Neurological:      Mental Status: He is alert and oriented to person, place, and time.   Psychiatric:         Behavior: Behavior normal.         Thought Content: Thought content normal.         Judgment: Judgment normal.         Assessment:     1. Dorsalgia, unspecified    2. Spondylosis without myelopathy or radiculopathy, lumbosacral region      Plan:   Dorsalgia, unspecified  -     X-Ray Lumbar Spine Complete 5 View; Future; Expected date: 07/13/2020  -     Ambulatory referral/consult to Physical/Occupational Therapy; Future; Expected date: 07/20/2020    Spondylosis without myelopathy or radiculopathy, lumbosacral region  -     X-Ray Lumbar Spine Complete 5 View; Future; Expected date: 07/13/2020  -     Urinalysis; Future  -     cyclobenzaprine (FLEXERIL) 10 MG tablet; Take 1 tablet (10 mg total) by mouth every evening.  Dispense: 30 tablet; Refill: 0  -     meloxicam (MOBIC) 15 MG tablet; Take 1 tablet once daily as needed for back pain  Dispense: 30 tablet; Refill: 0  -     Ambulatory referral/consult to Physical/Occupational Therapy; Future; Expected date: 07/20/2020    Educational handout on over-the-counter medications and at-home conservative care, pertinent to the patients diagnosis today, was handed to the patient and discussed in detail.      Follow up if symptoms worsen or fail to improve.

## 2020-07-20 ENCOUNTER — CLINICAL SUPPORT (OUTPATIENT)
Dept: REHABILITATION | Facility: HOSPITAL | Age: 55
End: 2020-07-20
Payer: COMMERCIAL

## 2020-07-20 DIAGNOSIS — R26.2 DIFFICULTY WALKING: ICD-10-CM

## 2020-07-20 DIAGNOSIS — M47.817 SPONDYLOSIS WITHOUT MYELOPATHY OR RADICULOPATHY, LUMBOSACRAL REGION: ICD-10-CM

## 2020-07-20 DIAGNOSIS — M54.50 CHRONIC MIDLINE LOW BACK PAIN WITHOUT SCIATICA: ICD-10-CM

## 2020-07-20 DIAGNOSIS — M47.816 SPONDYLOSIS OF LUMBAR SPINE: ICD-10-CM

## 2020-07-20 DIAGNOSIS — R53.1 GENERALIZED WEAKNESS: ICD-10-CM

## 2020-07-20 DIAGNOSIS — M54.9 DORSALGIA, UNSPECIFIED: ICD-10-CM

## 2020-07-20 DIAGNOSIS — G89.29 CHRONIC MIDLINE LOW BACK PAIN WITHOUT SCIATICA: ICD-10-CM

## 2020-07-20 PROCEDURE — 97110 THERAPEUTIC EXERCISES: CPT

## 2020-07-20 PROCEDURE — 97161 PT EVAL LOW COMPLEX 20 MIN: CPT

## 2020-07-20 NOTE — PLAN OF CARE
"[facility]  Physical Therapy Initial Evaluation    Date: 7/20/2020   Name: Vinny Stover  Clinic Number: [id number]    Therapy Diagnosis:   Encounter Diagnoses   Name Primary?    Dorsalgia, unspecified     Spondylosis without myelopathy or radiculopathy, lumbosacral region     Chronic midline low back pain without sciatica     Generalized weakness     Difficulty walking     Spondylosis of lumbar spine      Physician: Jany Palacio*    Physician Orders: PT Eval and Treat   Medical Diagnosis from Referral:   M54.9 (ICD-10-CM) - Dorsalgia, unspecified   M47.817 (ICD-10-CM) - Spondylosis without myelopathy or radiculopathy, lumbosacral region   Evaluation Date: 7/20/2020  Authorization Period Expiration: 7/31/21  Plan of Care Expiration: 10/16/2020  Visit # / Visits authorized: 1/ 1    Time In: 8:30 am   Time Out: 9:15 am   Total Appointment Time (timed & untimed codes): 45 minutes    Precautions: Standard    Subjective   Date of onset: pt reports back pain off and on for many years, however, states that 3 weeks ago he went for a run with his daughters and his back has really been bothering him   History of current condition - Vinny reports: to PT with complaints of low back pain that has been ongoing off and on for many years. Pt states that he has always been able to "work through" the pain and that typically the pain goes away after a week or so, however, that this time the pain has been ongoing for about 3 weeks. Pt states that he usually walks 3 miles with his wife every day, however, he has been unable to walk further than a mile since his latest exacerbation.      Medical History:   Past Medical History:   Diagnosis Date    Aphthous stomatitis 12/26/2019    Colon polyp     Gastroesophageal reflux disease without esophagitis 2/28/2018    Obstructive sleep apnea syndrome 12/26/2019       Surgical History:   Vinny Stover  has a past surgical history that " "includes Nose surgery and Colonoscopy (2015).    Medications:   Vinny has a current medication list which includes the following prescription(s): cyclobenzaprine and meloxicam.    Allergies:   Review of patient's allergies indicates:  No Known Allergies     Imaging, lumbar x-ray: FINDINGS:  The vertebral bodies demonstrate a normal height and alignment.  There is at least mild-to-moderate bilateral facet arthropathy noted at the L3-4 through the L5-S1 levels.  No pars defects.  There is a nonobstructing lower pole left renal calculus that measures approximately 3 mm.    Prior Therapy: no prior PT   Social History: pt lives with his family   Occupation: pt works as a manager for an insurance company; states that he gets up and walks as much as he can   Prior Level of Function: pt states that he used to be able to walk 3-5 miles with his wife daily   Current Level of Function: pt reports pain in low back daily; only able to walk up to a mile before he has pain in his low back     Pain:  Current 2/10, worst 8/10, best 2/10   Location:   Across the mid back; no radiation   Description: Aching, Grabbing and Sharp  Aggravating Factors: Standing, Morning, Flexing and Lifting  Easing Factors: pain medication, hot bath and rest    Patients goals: " I just want to be active again"     Objective     Observation: pt A&O x 4     Posture:  Decreased lumbar lordosis; mild sway back with flat back     Lumbar Range of Motion:    Degrees   Flexion 50     Extension 15     Left Side Bending 15   Right Side Bending 15   Left rotation   Mod limitation   Right Rotation   Mod limitation         Lower Extremity Strength  Right LE  Left LE    Knee extension: 5/5 Knee extension: 5/5   Knee flexion: 4/5 Knee flexion: 4+/5   Hip flexion: 4+/5 Hip flexion: 4+/5   Hip extension:  4/5 Hip extension: 4/5   Hip abduction: 4-/5 Hip abduction: 4-/5   Hip adduction: 4/5 Hip adduction 4/5   Ankle dorsiflexion: 5/5 Ankle dorsiflexion: 5/5   Ankle " "plantarflexion: 5/5 Ankle plantarflexion: 5/5         Special Tests:  -Repeated Flexion: decreases pain   -Repeated Ext: increases pain  -Piriformis Test: poor flexibility   -Bridge Test: unlevel pelvis with rising       DTR:   Right Left Comment   Patellar (L3-4) 2+ 2+    Achilles (S1) 2+ 2+        Neuro Dynamic Testing:    Sciatic nerve:      SLR: R = negative      L = negative          Slump test: R: negative                L: negative       Joint Mobility: hypomobility with PA's of lumbar spine     Palpation: increased tone in lumbar paraspinals     Sensation: intact     Flexibility: impaired HS flexibility and piriformis flexibility bilaterally            Limitation/Restriction for FOTO Low Back Survey    Therapist reviewed FOTO scores for Vinny Stover on 7/20/2020.   FOTO documents entered into [employer] - see Media section.    Limitation Score: 47%         TREATMENT   Treatment Time In: 9:00 am   Treatment Time Out: 9:15 am   Total Treatment time (time-based codes) separate from Evaluation: 15 minutes    Vinny received therapeutic exercises to develop strength, endurance, ROM, flexibility, posture and core stabilization for 15 minutes including:    TA activation 3" holds x 15 reps  SKTC 5" hold x 10 reps  LTR x 2 minutes   Bridges x 10 reps   SLR x 10 reps each       Home Exercises and Patient Education Provided    Education provided:   - - PT POC  - HEP  - PT prognosis and diagnosis  - all questions and concerns answered       Written Home Exercises Provided: yes.  Exercises were reviewed and Vinny was able to demonstrate them prior to the end of the session.  Vinny demonstrated good  understanding of the education provided.     See EMR under Patient Instructions for exercises provided 7/16/2020.    Assessment   Vinny is a 55 y.o. male referred to outpatient Physical Therapy with a medical diagnosis of   M54.9 (ICD-10-CM) - Dorsalgia, unspecified   M47.817 (ICD-10-CM) - Spondylosis without myelopathy " or radiculopathy, lumbosacral region     . Patient presents with complaints of low back pain that has been ongoing for several years. Pt presents with symptoms consistent with spinal stenosis and spondolysis. Pt responds with flexion > extension. He presents with poor core stability and TA control with poor pelvic/core stability. Pt will benefit from skilled PT intervention in order to address the deficits documented above in order to improve core stability and postural control as well as promote improved functional mobility and exercise tolerance. Pt educated on postural awareness and improved form with walking and running as well as daily stretching and lumbar mobility.     Patient prognosis is Good.   Patientt will benefit from skilled outpatient Physical Therapy to address the deficits stated above and in the chart below, provide patient /family education, and to maximize patientt's level of independence.     Plan of care discussed with patient: Yes  Patient's spiritual, cultural and educational needs considered and patient is agreeable to the plan of care and goals as stated below:     Anticipated Barriers for therapy: none noted     Medical Necessity is demonstrated by the following  History  Co-morbidities and personal factors that may impact the plan of care Co-morbidities:   pre-diabetes, low back pain    Personal Factors:   no deficits     low   Examination  Body Structures and Functions, activity limitations and participation restrictions that may impact the plan of care Body Regions:   back  lower extremities    Body Systems:    gross symmetry  ROM  strength  gross coordinated movement  transitions    Participation Restrictions:   Unable to walk for exercise with his wife     Activity limitations:   Learning and applying knowledge  no deficits    General Tasks and Commands  no deficits    Communication  no deficits    Mobility  walking    Self care  looking after one's health    Domestic  Life  shopping  doing house work (cleaning house, washing dishes, laundry)  assisting others    Interactions/Relationships  no deficits    Life Areas  no deficits    Community and Social Life  no deficits         low   Clinical Presentation stable and uncomplicated low   Decision Making/ Complexity Score: low     Goals:  Short Term Goals: 4 weeks   1. Pt will be independent with HEP performance in order to continue PT progress at home.   2. Pt will improve LE strength to 4+/5 in all planes in order to improve gait performance and postural control   3. Pt will report pain at worst of 4/10 in low back   4. Pt will demonstrate improved core stability as evidenced by ability to perform TA activation hold for 5 seconds or greater.   5. Pt will improve FOTO to 35% disability     Long Term Goals: 8 weeks   1. Pt will improve FOTO to 25% disability or less in order to improve overall QOL   2. Pt will report no pain in low back with daily activities   3. Pt will be able to perform yard work without pain in his low back   4. Pt will demonstrate ability to walk for 3 miles or greater for exercise without pain in low back     Plan   Plan of care Certification: 7/20/2020 to 10/16/2020.    Outpatient Physical Therapy 2 times weekly for 8 weeks to include the following interventions: Cervical/Lumbar Traction, Gait Training, Manual Therapy, Moist Heat/ Ice, Neuromuscular Re-ed, Patient Education, Therapeutic Activites, Therapeutic Exercise, Ultrasound and any other therapies or modalities as clinically indicated or appropriate, including virtual visits as appropriate. Pt may be seen by PTA as part of pt's care team.       Laurie Ovalle, PT, DPT   7/20/2020

## 2020-07-22 NOTE — PROGRESS NOTES
"  Physical Therapy Treatment Note     Name: Vinny Stover  Clinic Number: [id number]    Therapy Diagnosis: No diagnosis found.  Physician: Jany Palacio*    Visit Date: 7/24/2020    Physician Orders: PT Eval and Treat   Medical Diagnosis from Referral:   M54.9 (ICD-10-CM) - Dorsalgia, unspecified   M47.817 (ICD-10-CM) - Spondylosis without myelopathy or radiculopathy, lumbosacral region   Evaluation Date: 7/20/2020  Authorization Period Expiration: 7/31/21  Plan of Care Expiration: 10/16/2020  Visit # / Visits authorized:  2/ 30    Time In: 4:40 pm   Time Out: 5:25 pm   Total Billable Time: 45 minutes    Precautions: Standard       Subjective     Pt reports: that his low back is feeling better, but that he still has occasional pain, especially if he has been sitting for a long period of time.  He was compliant with home exercise program.  Response to previous treatment: reports mild soreness  Functional change: decrease in low back pain     Pain: 3/10  Location:  Low back     Objective     Vinny received therapeutic exercises to develop strength, endurance, ROM, flexibility, posture and core stabilization for 45 minutes including:     Upright bike 5 minutes    DKTC with swiss ball x 20 reps   TA activation 3" holds 2 x 10 reps  SKTC 5" hold x 10 reps  LTR x 2 minutes   Bridges 3 x 10 reps   SLR 2 x 15 reps each   Hip adduction squeeze with ball with 3" hold 2 x 10 reps   hooklying TA activation with band pull apart RTB 2 x 15 reps  hooklying TA activation with resisted shoulder extension RTB 2 x 15 reps   hooklying diagonals D2 RTB with TA activation x 10 reps each   TA activation marches x 10 reps alternating     Resisted lateral walks RTB 2 laps of 10'       Home Exercises Provided and Patient Education Provided      Education provided:   - continue previously issued HEP    Written Home Exercises Provided: Patient instructed to cont prior HEP.  Exercises were reviewed and Vinny was able to " demonstrate them prior to the end of the session.  Vinny demonstrated good  understanding of the education provided.     See EMR under Patient Instructions for exercises provided prior visit.    Assessment     Pt tolerated session well with minimal complaints. He requires cues for proper performance of sidelying abduction as well as to keep core engaged with band pull aparts. Weakness noted in B glute med as well as in glute recruitment. Pt will continue to benefit form   Vinny is progressing well towards his goals.   Pt prognosis is Good.     Pt will continue to benefit from skilled outpatient physical therapy to address the deficits listed in the problem list box on initial evaluation, provide pt/family education and to maximize pt's level of independence in the home and community environment.     Pt's spiritual, cultural and educational needs considered and pt agreeable to plan of care and goals.     Anticipated barriers to physical therapy: none     Goals:  Short Term Goals: 4 weeks   1. Pt will be independent with HEP performance in order to continue PT progress at home. -progressing, not met   2. Pt will improve LE strength to 4+/5 in all planes in order to improve gait performance and postural control -progressing, not met   3. Pt will report pain at worst of 4/10 in low back  -progressing, not met   4. Pt will demonstrate improved core stability as evidenced by ability to perform TA activation hold for 5 seconds or greater. -progressing, not met   5. Pt will improve FOTO to 35% disability -progressing, not met      Long Term Goals: 8 weeks   1. Pt will improve FOTO to 25% disability or less in order to improve overall QOL -progressing, not met   2. Pt will report no pain in low back with daily activities  -progressing, not met   3. Pt will be able to perform yard work without pain in his low back  -progressing, not met   4. Pt will demonstrate ability to walk for 3 miles or greater for exercise without  pain in low back -progressing, not met       Plan     Continue per PT POC, assess tolerance of today's session and progress exercise as tolerated and appropriate     Plan of care Certification: 7/20/2020 to 10/16/2020.     Outpatient Physical Therapy 2 times weekly for 8 weeks to include the following interventions: Cervical/Lumbar Traction, Gait Training, Manual Therapy, Moist Heat/ Ice, Neuromuscular Re-ed, Patient Education, Therapeutic Activites, Therapeutic Exercise, Ultrasound and any other therapies or modalities as clinically indicated or appropriate, including virtual visits as appropriate. Pt may be seen by PTA as part of pt's care team.       Laurie Ovalle, PT , DPT  7/22/2020

## 2020-07-24 ENCOUNTER — CLINICAL SUPPORT (OUTPATIENT)
Dept: REHABILITATION | Facility: HOSPITAL | Age: 55
End: 2020-07-24
Payer: COMMERCIAL

## 2020-07-24 DIAGNOSIS — R26.2 DIFFICULTY WALKING: ICD-10-CM

## 2020-07-24 DIAGNOSIS — M54.50 CHRONIC MIDLINE LOW BACK PAIN WITHOUT SCIATICA: ICD-10-CM

## 2020-07-24 DIAGNOSIS — R53.1 GENERALIZED WEAKNESS: ICD-10-CM

## 2020-07-24 DIAGNOSIS — M47.816 SPONDYLOSIS OF LUMBAR SPINE: ICD-10-CM

## 2020-07-24 DIAGNOSIS — G89.29 CHRONIC MIDLINE LOW BACK PAIN WITHOUT SCIATICA: ICD-10-CM

## 2020-07-24 PROCEDURE — 97110 THERAPEUTIC EXERCISES: CPT

## 2020-07-24 NOTE — PROGRESS NOTES
"  Physical Therapy Treatment Note     Name: Vinny Stover  Clinic Number: [id number]    Therapy Diagnosis:   Encounter Diagnoses   Name Primary?    Chronic midline low back pain without sciatica     Generalized weakness     Difficulty walking     Spondylosis of lumbar spine      Physician: Jany Palacio*    Visit Date: 7/27/2020    Physician Orders: PT Eval and Treat   Medical Diagnosis from Referral:   M54.9 (ICD-10-CM) - Dorsalgia, unspecified   M47.817 (ICD-10-CM) - Spondylosis without myelopathy or radiculopathy, lumbosacral region   Evaluation Date: 7/20/2020  Authorization Period Expiration: 7/31/21  Plan of Care Expiration: 10/16/2020  Visit # / Visits authorized:  3/ 30    Time In: 3:45 pm   Time Out: 4:30 pm   Total Billable Time: 45 minutes    Precautions: Standard       Subjective     Pt reports: that he is feeling pretty good today. He states that he felt good following his last session, but that he went shopping yesterday and that he had a lot of back pain when he had to  line trying to check out at [employer].   He was compliant with home exercise program.  Response to previous treatment: reports mild soreness  Functional change: decrease in low back pain     Pain: 3/10  Location:  Low back     Objective     Vinny received therapeutic exercises to develop strength, endurance, ROM, flexibility, posture and core stabilization for 45 minutes including:     Upright bike 5 minutes    DKTC with swiss ball x 20 reps   TA activation 3" holds 2 x 10 reps  SKTC 5" hold x 10 reps  LTR x 2 minutes   Bridges  x 15 reps   +bridges with band GTB x 15 reps   SLR #2 3 x 10 reps each   Hip adduction squeeze with ball with 3" hold 2 x 10 reps   hooklying TA activation with band pull apart RTB 2 x 15 reps  hooklying TA activation with resisted shoulder extension GTB 2 x 15 reps    +clamshells no band 2 x 10   +supine HS stretch 3 x 30" each   +SL SLR 2 x 10 each   hooklying diagonals D2 RTB with TA " activation x 10 reps each   TA activation marches x 10 reps alternating     Resisted lateral walks RTB 2 laps of 10'       Home Exercises Provided and Patient Education Provided      Education provided:   - continue previously issued HEP    Written Home Exercises Provided: Patient instructed to cont prior HEP.  Exercises were reviewed and Vinny was able to demonstrate them prior to the end of the session.  Vinny demonstrated good  understanding of the education provided.     See EMR under Patient Instructions for exercises provided prior visit.    Assessment     Pt tolerated session well with no complaints. Pt with good tolerance of progressed exercise resistance of SLR as well as progressed to clamshells today. Good fatigue noted with both exercises. Pt will continue to benefit from skilled PT intervention in order to further progress core stability and postural control.   Vinny is progressing well towards his goals.   Pt prognosis is Good.     Pt will continue to benefit from skilled outpatient physical therapy to address the deficits listed in the problem list box on initial evaluation, provide pt/family education and to maximize pt's level of independence in the home and community environment.     Pt's spiritual, cultural and educational needs considered and pt agreeable to plan of care and goals.     Anticipated barriers to physical therapy: none     Goals:  Short Term Goals: 4 weeks   1. Pt will be independent with HEP performance in order to continue PT progress at home. -goal met, 7/27/2020  2. Pt will improve LE strength to 4+/5 in all planes in order to improve gait performance and postural control -progressing, not met   3. Pt will report pain at worst of 4/10 in low back  -progressing, not met   4. Pt will demonstrate improved core stability as evidenced by ability to perform TA activation hold for 5 seconds or greater. -progressing, not met   5. Pt will improve FOTO to 35% disability -progressing,  not met      Long Term Goals: 8 weeks   1. Pt will improve FOTO to 25% disability or less in order to improve overall QOL -progressing, not met   2. Pt will report no pain in low back with daily activities  -progressing, not met   3. Pt will be able to perform yard work without pain in his low back  -progressing, not met   4. Pt will demonstrate ability to walk for 3 miles or greater for exercise without pain in low back -progressing, not met       Plan     Continue per PT POC, assess tolerance of today's session and progress exercise as tolerated and appropriate     Plan of care Certification: 7/20/2020 to 10/16/2020.     Outpatient Physical Therapy 2 times weekly for 8 weeks to include the following interventions: Cervical/Lumbar Traction, Gait Training, Manual Therapy, Moist Heat/ Ice, Neuromuscular Re-ed, Patient Education, Therapeutic Activites, Therapeutic Exercise, Ultrasound and any other therapies or modalities as clinically indicated or appropriate, including virtual visits as appropriate. Pt may be seen by PTA as part of pt's care team.       Laurie Ovalle, PT , DPT  7/27/2020

## 2020-07-27 ENCOUNTER — CLINICAL SUPPORT (OUTPATIENT)
Dept: REHABILITATION | Facility: HOSPITAL | Age: 55
End: 2020-07-27
Payer: COMMERCIAL

## 2020-07-27 DIAGNOSIS — R53.1 GENERALIZED WEAKNESS: ICD-10-CM

## 2020-07-27 DIAGNOSIS — M54.50 CHRONIC MIDLINE LOW BACK PAIN WITHOUT SCIATICA: ICD-10-CM

## 2020-07-27 DIAGNOSIS — G89.29 CHRONIC MIDLINE LOW BACK PAIN WITHOUT SCIATICA: ICD-10-CM

## 2020-07-27 DIAGNOSIS — M47.816 SPONDYLOSIS OF LUMBAR SPINE: ICD-10-CM

## 2020-07-27 DIAGNOSIS — R26.2 DIFFICULTY WALKING: ICD-10-CM

## 2020-07-27 PROCEDURE — 97110 THERAPEUTIC EXERCISES: CPT

## 2020-07-29 NOTE — PROGRESS NOTES
"  Physical Therapy Treatment Note     Name: Vinny Stover  Clinic Number: [id number]    Therapy Diagnosis:   No diagnosis found.  Physician: Jany Palacio*    Visit Date: 7/30/2020    Physician Orders: PT Eval and Treat   Medical Diagnosis from Referral:   M54.9 (ICD-10-CM) - Dorsalgia, unspecified   M47.817 (ICD-10-CM) - Spondylosis without myelopathy or radiculopathy, lumbosacral region   Evaluation Date: 7/20/2020   PN DUE: 8/20/2020  Authorization Period Expiration: 7/31/21  Plan of Care Expiration: 10/16/2020  Visit # / Visits authorized:  4/ 30    Time In: 3:40 pm    Time Out: 3:25 pm   Total Billable Time: 45 minutes    Precautions: Standard       Subjective     Pt reports: that he is feeling good today. He states that he has switched to just advil for pain. Pt reports that he was a little sore after his last session, but that now he is feeling good.   He was compliant with home exercise program.  Response to previous treatment: reports mild soreness  Functional change: decrease in low back pain     Pain: 0/10  Location:  Low back     Objective     Vinny received therapeutic exercises (exercises performed today are bolded)   to develop strength, endurance, ROM, flexibility, posture and core stabilization for 45 minutes including:     Upright bike 5 minutes    DKTC with swiss ball x 20 reps   TA activation 3" holds 2 x 10 reps  SKTC 10" hold x 5 reps  LTR x 2 minutes   bridges with band GTB 2 x 15 reps   SLR #2 3 x 10 reps each   Hip adduction squeeze with ball with 5" hold 2 x 10 reps   +piriformis stretch 3 x 30" each   hooklying TA activation with band pull apart RTB 2 x 15 reps  hooklying TA activation with resisted shoulder extension GTB 2 x 15 reps    clamshells ball between feet 2 x 10   supine HS stretch 3 x 30" each   SL SLR 2 x 15 each   hooklying diagonals D2 RTB with TA activation x 10 reps each   TA activation marches x 10 reps alternating     Resisted lateral walks RTB 2 laps of 10' "     +Farmer's carry #15 KB x 2 laps   +Farmer's carry with hand off #15 KB  +Front carry with #15 KB x 2 laps       Home Exercises Provided and Patient Education Provided      Education provided:   - continue previously issued HEP    Written Home Exercises Provided: Patient instructed to cont prior HEP.  Exercises were reviewed and Vinny was able to demonstrate them prior to the end of the session.  Vinny demonstrated good  understanding of the education provided.     See EMR under Patient Instructions for exercises provided prior visit.    Assessment     Pt tolerated session well with minimal complaints. Pt initially had no pain at beginning of session, however, does have some discomfort with farmer's carry activities. Pt reports a decrease in pain after stretching after slight pain with farmer's carry activities. Pt will continue to benefit from skilled PT intervention in order to further progress core stability and postural control needed for return to PLOF and improve overall QOL.   Vinny is progressing well towards his goals.   Pt prognosis is Good.     Pt will continue to benefit from skilled outpatient physical therapy to address the deficits listed in the problem list box on initial evaluation, provide pt/family education and to maximize pt's level of independence in the home and community environment.     Pt's spiritual, cultural and educational needs considered and pt agreeable to plan of care and goals.     Anticipated barriers to physical therapy: none     Goals:  Short Term Goals: 4 weeks   1. Pt will be independent with HEP performance in order to continue PT progress at home. -goal met, 7/27/2020  2. Pt will improve LE strength to 4+/5 in all planes in order to improve gait performance and postural control -progressing, not met   3. Pt will report pain at worst of 4/10 in low back  -progressing, not met   4. Pt will demonstrate improved core stability as evidenced by ability to perform TA  activation hold for 5 seconds or greater. -progressing, not met   5. Pt will improve FOTO to 35% disability -progressing, not met      Long Term Goals: 8 weeks   1. Pt will improve FOTO to 25% disability or less in order to improve overall QOL -progressing, not met   2. Pt will report no pain in low back with daily activities  -progressing, not met   3. Pt will be able to perform yard work without pain in his low back  -progressing, not met   4. Pt will demonstrate ability to walk for 3 miles or greater for exercise without pain in low back -progressing, not met       Plan     Continue per PT POC, assess tolerance of today's session and progress exercise as tolerated and appropriate     Plan of care Certification: 7/20/2020 to 10/16/2020.     Outpatient Physical Therapy 2 times weekly for 8 weeks to include the following interventions: Cervical/Lumbar Traction, Gait Training, Manual Therapy, Moist Heat/ Ice, Neuromuscular Re-ed, Patient Education, Therapeutic Activites, Therapeutic Exercise, Ultrasound and any other therapies or modalities as clinically indicated or appropriate, including virtual visits as appropriate. Pt may be seen by PTA as part of pt's care team.       Laurie Ovalle, PT , DPT  7/29/2020

## 2020-07-30 ENCOUNTER — CLINICAL SUPPORT (OUTPATIENT)
Dept: REHABILITATION | Facility: HOSPITAL | Age: 55
End: 2020-07-30
Payer: COMMERCIAL

## 2020-07-30 DIAGNOSIS — R53.1 GENERALIZED WEAKNESS: ICD-10-CM

## 2020-07-30 DIAGNOSIS — M47.816 SPONDYLOSIS OF LUMBAR SPINE: ICD-10-CM

## 2020-07-30 DIAGNOSIS — M54.50 CHRONIC MIDLINE LOW BACK PAIN WITHOUT SCIATICA: ICD-10-CM

## 2020-07-30 DIAGNOSIS — R26.2 DIFFICULTY WALKING: ICD-10-CM

## 2020-07-30 DIAGNOSIS — G89.29 CHRONIC MIDLINE LOW BACK PAIN WITHOUT SCIATICA: ICD-10-CM

## 2020-07-30 PROCEDURE — 97110 THERAPEUTIC EXERCISES: CPT

## 2020-08-03 ENCOUNTER — CLINICAL SUPPORT (OUTPATIENT)
Dept: REHABILITATION | Facility: HOSPITAL | Age: 55
End: 2020-08-03
Payer: COMMERCIAL

## 2020-08-03 DIAGNOSIS — M54.50 CHRONIC MIDLINE LOW BACK PAIN WITHOUT SCIATICA: ICD-10-CM

## 2020-08-03 DIAGNOSIS — M47.816 SPONDYLOSIS OF LUMBAR SPINE: ICD-10-CM

## 2020-08-03 DIAGNOSIS — R53.1 GENERALIZED WEAKNESS: ICD-10-CM

## 2020-08-03 DIAGNOSIS — R26.2 DIFFICULTY WALKING: ICD-10-CM

## 2020-08-03 DIAGNOSIS — G89.29 CHRONIC MIDLINE LOW BACK PAIN WITHOUT SCIATICA: ICD-10-CM

## 2020-08-03 PROCEDURE — 97110 THERAPEUTIC EXERCISES: CPT

## 2020-08-03 NOTE — PROGRESS NOTES
"  Physical Therapy Treatment Note     Name: Vinny Stover  Clinic Number: [id number]    Therapy Diagnosis:   No diagnosis found.  Physician: Jany Palacio*    Visit Date: 8/3/2020    Physician Orders: PT Eval and Treat   Medical Diagnosis from Referral:   M54.9 (ICD-10-CM) - Dorsalgia, unspecified   M47.817 (ICD-10-CM) - Spondylosis without myelopathy or radiculopathy, lumbosacral region   Evaluation Date: 7/20/2020   PN DUE: 8/20/2020  Authorization Period Expiration: 7/31/21  Plan of Care Expiration: 10/16/2020  Visit # / Visits authorized:***  4/ 30    Time In: ***   Time Out: ***   Total Billable Time: *** minutes    Precautions: Standard       Subjective     Pt reports: ***  He was compliant with home exercise program.  Response to previous treatment: reports mild soreness  Functional change: decrease in low back pain     Pain: 0/10  Location:  Low back     Objective     Vinny received therapeutic exercises (exercises performed today are bolded)   to develop strength, endurance, ROM, flexibility, posture and core stabilization for *** minutes including:     Upright bike 5 minutes    DKTC with swiss ball x 20 reps   TA activation 3" holds 2 x 10 reps  SKTC 10" hold x 5 reps  LTR x 2 minutes   bridges with band GTB 2 x 15 reps   SLR #2 3 x 10 reps each   Hip adduction squeeze with ball with 5" hold 2 x 10 reps   +piriformis stretch 3 x 30" each   hooklying TA activation with band pull apart RTB 2 x 15 reps  hooklying TA activation with resisted shoulder extension GTB 2 x 15 reps    clamshells ball between feet 2 x 10   supine HS stretch 3 x 30" each   SL SLR 2 x 15 each   hooklying diagonals D2 RTB with TA activation x 10 reps each   TA activation marches x 10 reps alternating     Resisted lateral walks RTB 2 laps of 10'     +Farmer's carry #15 KB x 2 laps   +Farmer's carry with hand off #15 KB  +Front carry with #15 KB x 2 laps       Home Exercises Provided and Patient Education Provided    "   Education provided:   - continue previously issued HEP    Written Home Exercises Provided: Patient instructed to cont prior HEP.  Exercises were reviewed and Vinny was able to demonstrate them prior to the end of the session.  Vinny demonstrated good  understanding of the education provided.     See EMR under Patient Instructions for exercises provided prior visit.    Assessment     ***   Vinny is progressing well towards his goals.   Pt prognosis is Good.     Pt will continue to benefit from skilled outpatient physical therapy to address the deficits listed in the problem list box on initial evaluation, provide pt/family education and to maximize pt's level of independence in the home and community environment.     Pt's spiritual, cultural and educational needs considered and pt agreeable to plan of care and goals.     Anticipated barriers to physical therapy: none     Goals:  Short Term Goals: 4 weeks   1. Pt will be independent with HEP performance in order to continue PT progress at home. -goal met, 7/27/2020  2. Pt will improve LE strength to 4+/5 in all planes in order to improve gait performance and postural control -progressing, not met   3. Pt will report pain at worst of 4/10 in low back  -progressing, not met   4. Pt will demonstrate improved core stability as evidenced by ability to perform TA activation hold for 5 seconds or greater. -progressing, not met   5. Pt will improve FOTO to 35% disability -progressing, not met      Long Term Goals: 8 weeks   1. Pt will improve FOTO to 25% disability or less in order to improve overall QOL -progressing, not met   2. Pt will report no pain in low back with daily activities  -progressing, not met   3. Pt will be able to perform yard work without pain in his low back  -progressing, not met   4. Pt will demonstrate ability to walk for 3 miles or greater for exercise without pain in low back -progressing, not met       Plan     Continue per PT POC, assess  tolerance of today's session and progress exercise as tolerated and appropriate     Plan of care Certification: 7/20/2020 to 10/16/2020.     Outpatient Physical Therapy 2 times weekly for 8 weeks to include the following interventions: Cervical/Lumbar Traction, Gait Training, Manual Therapy, Moist Heat/ Ice, Neuromuscular Re-ed, Patient Education, Therapeutic Activites, Therapeutic Exercise, Ultrasound and any other therapies or modalities as clinically indicated or appropriate, including virtual visits as appropriate. Pt may be seen by PTA as part of pt's care team.       Laurie Ovalle, PT , DPT  8/3/2020

## 2020-08-03 NOTE — PROGRESS NOTES
"  Physical Therapy Treatment Note     Name: Vinny Stover  Clinic Number: [id number]    Therapy Diagnosis:   No diagnosis found.  Physician: Jany Palacio*    Visit Date: 8/3/2020    Physician Orders: PT Eval and Treat   Medical Diagnosis from Referral:   M54.9 (ICD-10-CM) - Dorsalgia, unspecified   M47.817 (ICD-10-CM) - Spondylosis without myelopathy or radiculopathy, lumbosacral region   Evaluation Date: 7/20/2020   PN DUE: 8/20/2020  Authorization Period Expiration: 7/31/21  Plan of Care Expiration: 10/16/2020  Visit # / Visits authorized:  5/ 30    Time In: 3:40 pm    Time Out: 4:25 pm    Total Billable Time: 45 minutes    Precautions: Standard       Subjective     Pt reports: no new complaints. He states that overall he has been feeling a lot better since starting PT. Pt states that he has much less pain and that overall he is feeling a lot better.   He was compliant with home exercise program.  Response to previous treatment: reports mild soreness  Functional change: decrease in low back pain     Pain: 0/10  Location:  Low back     Objective     Vinny received therapeutic exercises (exercises performed today are bolded)   to develop strength, endurance, ROM, flexibility, posture and core stabilization for 45 minutes including:     Upright bike 5 minutes    DKTC with swiss ball x 20 reps   TA activation 3" holds 2 x 10 reps  SKTC 10" hold x 5 reps  LTR x 2 minutes   bridges with band GTB 2 x 15 reps   SLR #2 3 x 10 reps each   Hip adduction squeeze with ball with 5" hold 2 x 10 reps   piriformis stretch 3 x 30" each   hooklying TA activation with band pull apart RTB 2 x 15 reps  hooklying TA activation with resisted shoulder extension GTB 2 x 15 reps    clamshells ball between feet 2 x 10   supine HS stretch 3 x 30" each   SL SLR 3 x 10 each   hooklying diagonals D2 RTB with TA activation x 10 reps each   TA activation marches x 10 reps alternating     Resisted lateral walks RTB 2 laps of 10' "     Freemotion:   Forward walk outs #30 x 5   Backwards walk outs #30 x 5   Anti-rotational press #30 2 x 15 each side  Lifts #20 x 15 each side     Farmer's carry #10 KB x 2 laps   Farmer's carry with hand off #10 KB  Front carry with #15 KB x 2 laps       Home Exercises Provided and Patient Education Provided      Education provided:   - continue previously issued HEP    Written Home Exercises Provided: Patient instructed to cont prior HEP.  Exercises were reviewed and Vinny was able to demonstrate them prior to the end of the session.  Vinny demonstrated good  understanding of the education provided.     See EMR under Patient Instructions for exercises provided prior visit.    Assessment     Pt tolerated session well with no complaints. Pt continues to demonstrate improved core control and stability as well as LE strength. Pt with good tolerance of progressed exercise today with improved tolerance of farmer's carry activities regressed to #10 KB with no complaints of pain. Pt will continue to benefit from skilled PT intervention in order to further progress core stability and postural control to promote safe and appropriate tapering of program with goal of discharge to [facility].   Vinny is progressing well towards his goals.   Pt prognosis is Good.     Pt will continue to benefit from skilled outpatient physical therapy to address the deficits listed in the problem list box on initial evaluation, provide pt/family education and to maximize pt's level of independence in the home and community environment.     Pt's spiritual, cultural and educational needs considered and pt agreeable to plan of care and goals.     Anticipated barriers to physical therapy: none     Goals:  Short Term Goals: 4 weeks   1. Pt will be independent with HEP performance in order to continue PT progress at home. -goal met, 7/27/2020  2. Pt will improve LE strength to 4+/5 in all planes in order to improve gait performance and postural  control -progressing, not met   3. Pt will report pain at worst of 4/10 in low back  -goal met, 8/3/2020  4. Pt will demonstrate improved core stability as evidenced by ability to perform TA activation hold for 5 seconds or greater. -progressing, not met   5. Pt will improve FOTO to 35% disability -progressing, not met      Long Term Goals: 8 weeks   1. Pt will improve FOTO to 25% disability or less in order to improve overall QOL -progressing, not met   2. Pt will report no pain in low back with daily activities  -progressing, not met   3. Pt will be able to perform yard work without pain in his low back  -progressing, not met   4. Pt will demonstrate ability to walk for 3 miles or greater for exercise without pain in low back -progressing, not met       Plan     Continue per PT POC, assess tolerance of today's session and progress exercise as tolerated and appropriate     Plan of care Certification: 7/20/2020 to 10/16/2020.     Outpatient Physical Therapy 2 times weekly for 8 weeks to include the following interventions: Cervical/Lumbar Traction, Gait Training, Manual Therapy, Moist Heat/ Ice, Neuromuscular Re-ed, Patient Education, Therapeutic Activites, Therapeutic Exercise, Ultrasound and any other therapies or modalities as clinically indicated or appropriate, including virtual visits as appropriate. Pt may be seen by PTA as part of pt's care team.       Laurie Ovalle, PT , DPT  8/3/2020

## 2020-08-05 NOTE — PROGRESS NOTES
"  Physical Therapy Treatment Note     Name: Vinny Stover  Clinic Number: [id number]    Therapy Diagnosis:   Encounter Diagnoses   Name Primary?    Chronic midline low back pain without sciatica     Generalized weakness     Difficulty walking     Spondylosis of lumbar spine      Physician: Jany Palacio*    Visit Date: 8/6/2020    Physician Orders: PT Eval and Treat   Medical Diagnosis from Referral:   M54.9 (ICD-10-CM) - Dorsalgia, unspecified   M47.817 (ICD-10-CM) - Spondylosis without myelopathy or radiculopathy, lumbosacral region   Evaluation Date: 7/20/2020   PN DUE: 8/20/2020  Authorization Period Expiration: 7/31/21  Plan of Care Expiration: 10/16/2020  Visit # / Visits authorized:  6/ 30    Time In: 3:45 pm    Time Out: 4:30 pm     Total Billable Time: 45 minutes    Precautions: Standard       Subjective     Pt reports: that overall his low back is feeling better. He reports mild soreness in his low back after his last session, but that overall his pain has greatly improved.   He was compliant with home exercise program.  Response to previous treatment: reports mild soreness  Functional change: decrease in low back pain     Pain: 0/10  Location:  Low back     Objective     Vinny received therapeutic exercises (exercises performed today are bolded)   to develop strength, endurance, ROM, flexibility, posture and core stabilization for 45 minutes including:     Upright bike 5 minutes    DKTC with swiss ball x 20 reps   SKTC 10" hold x 5 reps  bridges with band GTB 2 x 15 reps   SLR #2 3 x 10 reps each   Hip adduction squeeze with ball with 5" hold 2 x 10 reps   piriformis stretch 3 x 30" each   hooklying TA activation with band pull apart RTB 2 x 15 reps  hooklying TA activation with resisted shoulder extension GTB 2 x 15 reps    clamshells ball between feet 2 x 15   supine HS stretch 3 x 30" each   SL SLR 3 x 10 each     Resisted lateral walks RTB 2 laps of 10'     Freemotion:   Forward walk " outs #30 x 10   Backwards walk outs #30 x 10   Anti-rotational press #30 2 x 15 each side  Lifts #20 x 15 each side   +chops #20 x 15 each side  +rotation #30 2 x 15 each side     Farmer's carry #10 KB x 2 laps   Farmer's carry with hand off #10 KB  Front carry with #15 KB x 2 laps     FOTO: 17% disability       Home Exercises Provided and Patient Education Provided      Education provided:   - continue previously issued HEP    Written Home Exercises Provided: Patient instructed to cont prior HEP.  Exercises were reviewed and Vinny was able to demonstrate them prior to the end of the session.  Vinny demonstrated good  understanding of the education provided.     See EMR under Patient Instructions for exercises provided prior visit.    Assessment     Pt tolerated session well with no complaints. He continues to demonstrate improved core stability and postural control. Pt had good tolerance to progressed exercise today in order to dynamically challenge his rotational and antirotational control. Pt with improved disability per FOTO and overall reports improved function. Discussed with pt tapering visits to once weekly in order to progress towards discharge to independent HEP performance in the coming weeks.    Vinny is progressing well towards his goals.   Pt prognosis is Good.     Pt will continue to benefit from skilled outpatient physical therapy to address the deficits listed in the problem list box on initial evaluation, provide pt/family education and to maximize pt's level of independence in the home and community environment.     Pt's spiritual, cultural and educational needs considered and pt agreeable to plan of care and goals.     Anticipated barriers to physical therapy: none     Goals:  Short Term Goals: 4 weeks   1. Pt will be independent with HEP performance in order to continue PT progress at home. -goal met, 7/27/2020  2. Pt will improve LE strength to 4+/5 in all planes in order to improve gait  performance and postural control -progressing, not met   3. Pt will report pain at worst of 4/10 in low back  -goal met, 8/3/2020  4. Pt will demonstrate improved core stability as evidenced by ability to perform TA activation hold for 5 seconds or greater. -goal met 8/6/2020  5. Pt will improve FOTO to 35% disability -progressing, not met      Long Term Goals: 8 weeks   1. Pt will improve FOTO to 25% disability or less in order to improve overall QOL -goal met 8/6/2020  2. Pt will report no pain in low back with daily activities  -progressing, not met   3. Pt will be able to perform yard work without pain in his low back  -progressing, not met   4. Pt will demonstrate ability to walk for 3 miles or greater for exercise without pain in low back -progressing, not met       Plan     Continue per PT POC, assess tolerance of today's session and progress exercise as tolerated and appropriate     Plan of care Certification: 7/20/2020 to 10/16/2020.     Based off of pt's progress and reports of less pain and more function, pt to be tapered to once weekly at this time to assess HEP performance and tolerance of tapering toward discharge to HEP.       Laurie Ovalle, PT , DPT  8/6/2020

## 2020-08-06 ENCOUNTER — CLINICAL SUPPORT (OUTPATIENT)
Dept: REHABILITATION | Facility: HOSPITAL | Age: 55
End: 2020-08-06
Payer: COMMERCIAL

## 2020-08-06 DIAGNOSIS — M54.50 CHRONIC MIDLINE LOW BACK PAIN WITHOUT SCIATICA: ICD-10-CM

## 2020-08-06 DIAGNOSIS — R53.1 GENERALIZED WEAKNESS: ICD-10-CM

## 2020-08-06 DIAGNOSIS — M47.816 SPONDYLOSIS OF LUMBAR SPINE: ICD-10-CM

## 2020-08-06 DIAGNOSIS — R26.2 DIFFICULTY WALKING: ICD-10-CM

## 2020-08-06 DIAGNOSIS — G89.29 CHRONIC MIDLINE LOW BACK PAIN WITHOUT SCIATICA: ICD-10-CM

## 2020-08-06 PROCEDURE — 97110 THERAPEUTIC EXERCISES: CPT

## 2020-08-12 NOTE — PROGRESS NOTES
"  Physical Therapy Treatment Note     Name: Vinny Stover  Clinic Number: [id number]    Therapy Diagnosis:   No diagnosis found.  Physician: Jany Palacio*    Visit Date: 8/13/2020    Physician Orders: PT Eval and Treat   Medical Diagnosis from Referral:   M54.9 (ICD-10-CM) - Dorsalgia, unspecified   M47.817 (ICD-10-CM) - Spondylosis without myelopathy or radiculopathy, lumbosacral region   Evaluation Date: 7/20/2020   PN DUE: 8/20/2020  Authorization Period Expiration: 7/31/21  Plan of Care Expiration: 10/16/2020  Visit # / Visits authorized:  7/ 30    Time In: 3:45 pm     Time Out: 4:30 pm      Total Billable Time: 45 minutes    Precautions: Standard       Subjective     Pt reports: no new complaints. He states that he has really been feeling good and that he was able to travel to [place] without any pain in his back during his drive.   He was compliant with home exercise program.  Response to previous treatment: reports mild soreness  Functional change: decrease in low back pain     Pain: 0/10  Location:  Low back     Objective     Vinny received therapeutic exercises (exercises performed today are bolded)   to develop strength, endurance, ROM, flexibility, posture and core stabilization for 45 minutes including:     Upright bike 5 minutes    DKTC with swiss ball x 20 reps   SKTC 10" hold x 5 reps  bridges with band GTB 3 x 10 reps   SLR #2 3 x 10 reps each   clamshells ball between feet 2 x 15   SL SLR 3 x 10 each     Resisted lateral walks RTB 2 laps of 20'     Freemotion:   Forward walk outs #30 x 10   Backwards walk outs #30 x 10   Anti-rotational press #40  x 15 each side  Lifts #20 x 15 each side   chops #30 x 15 each side  rotation #40 2 x 15 each side   +rotation to pallof press #40 x 15 reps each side   +half kneeling on blue foam pallof press #20  2 x 15 each direction    Farmer's carry #15 KB x 3 laps   Farmer's carry with hand off #15 KB x 2 laps   +farmer's carry with #10 in one hand and " #15 in the other squat to ground and hand off at end of each lap x 3 laps       Home Exercises Provided and Patient Education Provided      Education provided:   - continue previously issued HEP    Written Home Exercises Provided: Patient instructed to cont prior HEP.  Exercises were reviewed and Vinny was able to demonstrate them prior to the end of the session.  Vinny demonstrated good  understanding of the education provided.     See EMR under Patient Instructions for exercises provided initial evaluation and 8/3/2020.    Assessment     Pt tolerated session well with no complaints. He continues to demonstrate improved core and postural control with no complaints of pain with progressed activities today. Pt with good control noted with anti-rotational tasks as well as with tasks that require stabilization with rotation. Pt will continue to benefit from skilled PT intervention in order to further progress core stability and postural control and facilitate safe and appropriate return to PLOF   Vinny is progressing well towards his goals.   Pt prognosis is Good.     Pt will continue to benefit from skilled outpatient physical therapy to address the deficits listed in the problem list box on initial evaluation, provide pt/family education and to maximize pt's level of independence in the home and community environment.     Pt's spiritual, cultural and educational needs considered and pt agreeable to plan of care and goals.     Anticipated barriers to physical therapy: none     Goals:  Short Term Goals: 4 weeks   1. Pt will be independent with HEP performance in order to continue PT progress at home. -goal met, 7/27/2020  2. Pt will improve LE strength to 4+/5 in all planes in order to improve gait performance and postural control -progressing, not met   3. Pt will report pain at worst of 4/10 in low back  -goal met, 8/3/2020  4. Pt will demonstrate improved core stability as evidenced by ability to perform TA  activation hold for 5 seconds or greater. -goal met 8/6/2020  5. Pt will improve FOTO to 35% disability -goal met 8/6/2020     Long Term Goals: 8 weeks   1. Pt will improve FOTO to 25% disability or less in order to improve overall QOL -goal met 8/6/2020  2. Pt will report no pain in low back with daily activities  -progressing, not met   3. Pt will be able to perform yard work without pain in his low back  -progressing, not met   4. Pt will demonstrate ability to walk for 3 miles or greater for exercise without pain in low back -progressing, not met       Plan     Continue per PT POC, assess tolerance of today's session and progress exercise as tolerated and appropriate     Plan of care Certification: 7/20/2020 to 10/16/2020.     Based off of pt's progress and reports of less pain and more function, pt to be tapered to once weekly at this time to assess HEP performance and tolerance of tapering toward discharge to HEP.       Laurie Ovalle, PT , DPT  8/12/2020

## 2020-08-13 ENCOUNTER — CLINICAL SUPPORT (OUTPATIENT)
Dept: REHABILITATION | Facility: HOSPITAL | Age: 55
End: 2020-08-13
Payer: COMMERCIAL

## 2020-08-13 DIAGNOSIS — M47.816 SPONDYLOSIS OF LUMBAR SPINE: ICD-10-CM

## 2020-08-13 DIAGNOSIS — R53.1 GENERALIZED WEAKNESS: ICD-10-CM

## 2020-08-13 DIAGNOSIS — M54.50 CHRONIC MIDLINE LOW BACK PAIN WITHOUT SCIATICA: ICD-10-CM

## 2020-08-13 DIAGNOSIS — G89.29 CHRONIC MIDLINE LOW BACK PAIN WITHOUT SCIATICA: ICD-10-CM

## 2020-08-13 DIAGNOSIS — R26.2 DIFFICULTY WALKING: ICD-10-CM

## 2020-08-13 PROCEDURE — 97110 THERAPEUTIC EXERCISES: CPT

## 2020-08-14 NOTE — PROGRESS NOTES
Physical Therapy Treatment Note     Name: Vinny Stover  Clinic Number: [id number]    Therapy Diagnosis:   No diagnosis found.  Physician: Jany Palacio*    Visit Date: 8/17/2020    Physician Orders: PT Eval and Treat   Medical Diagnosis from Referral:   M54.9 (ICD-10-CM) - Dorsalgia, unspecified   M47.817 (ICD-10-CM) - Spondylosis without myelopathy or radiculopathy, lumbosacral region   Evaluation Date: 7/20/2020   PN DUE: 9/17/2020  Authorization Period Expiration: 7/31/21  Plan of Care Expiration: 10/16/2020  Visit # / Visits authorized:  8/ 30    Time In: 3:45 pm     Time Out: 4:30 pm       Total Billable Time: 45 minutes    Precautions: Standard       Subjective     Pt reports: that overall all he is feeling better. He states that he was a little following his last session, but that his soreness went away in about a day.   He was compliant with home exercise program.  Response to previous treatment: reports mild soreness  Functional change: decrease in low back pain     Pain: 0/10  Location:  Low back     Objective     Lumbar Range of Motion:     Degrees   Flexion 55      Extension 20      Left Side Bending 20   Right Side Bending 20   Left rotation    Min limitation   Right Rotation    Min limitation          Lower Extremity Strength   Right LE   Left LE     Knee extension: 5/5 Knee extension: 5/5   Knee flexion: 4+/5 Knee flexion: 4+/5   Hip flexion: 4+/5 Hip flexion: 4+/5   Hip extension:  4/5 Hip extension: 4/5   Hip abduction: 4+/5 Hip abduction: 4+/5   Hip adduction: 4/5 Hip adduction 4/5   Ankle dorsiflexion: 5/5 Ankle dorsiflexion: 5/5   Ankle plantarflexion: 5/5 Ankle plantarflexion: 5/5      FOTO: 17% disability (from 8/6/2020)    Vinny received therapeutic exercises (exercises performed today are bolded)   to develop strength, endurance, ROM, flexibility, posture and core stabilization for 45 minutes including:     Upright bike 5 minutes    DKTC with swiss ball x 20 reps   +single leg  bridge 2 x 15 reps each   bridges with band GTB 3 x 10 reps   SLR #2 3 x 10 reps each   clamshells ball between feet 2 x 15   SL SLR 3 x 10 each     Resisted lateral walks RTB 2 laps of 20'     Freemotion:   Forward walk outs #30 x 10   Backwards walk outs #30 x 10   Anti-rotational press #40  x 15 each side  Lifts #20 x 15 each side   chops #30 x 15 each side  rotation #40 2 x 15 each side   rotation to pallof press #40 x 15 reps each side   half kneeling on blue foam pallof press #20  2 x 15 each direction    Farmer's carry #20 KB x 3 laps   Farmer's carry with hand off #15 KB x 3 laps   farmer's carry with #20 in one hand and #15 in the other squat to ground and hand off at end of each lap x 3 laps  +squats to  #20 KB and place on tall rogue box, squat to place KB back down on the ground x 10 reps        Home Exercises Provided and Patient Education Provided      Education provided:   - continue previously issued HEP    Written Home Exercises Provided: Patient instructed to cont prior HEP.  Exercises were reviewed and Vinny was able to demonstrate them prior to the end of the session.  Vinny demonstrated good  understanding of the education provided.     See EMR under Patient Instructions for exercises provided initial evaluation and 8/3/2020.    Assessment     Pt has made good progress with therapy thus far. He is progressing well toward all goals and has already met all STGs and part of his LTGs. Pt with no reports of back pain for 2 weeks and has demonstrated good levels of independence with HEP. Pt's exercise was progressed again today to progress towards functional movements. Pt with no complaints with any progressions today. Discussed with pt continuing with one weekly for additional 4 weeks then discharge to HEP and gym maintenance.   Vinny is progressing well towards his goals.   Pt prognosis is Good.     Pt will continue to benefit from skilled outpatient physical therapy to address the  deficits listed in the problem list box on initial evaluation, provide pt/family education and to maximize pt's level of independence in the home and community environment.     Pt's spiritual, cultural and educational needs considered and pt agreeable to plan of care and goals.     Anticipated barriers to physical therapy: none     Goals:  Short Term Goals: 4 weeks   1. Pt will be independent with HEP performance in order to continue PT progress at home. -goal met, 7/27/2020  2. Pt will improve LE strength to 4+/5 in all planes in order to improve gait performance and postural control -goal met, 8/17/2020  3. Pt will report pain at worst of 4/10 in low back  -goal met, 8/3/2020  4. Pt will demonstrate improved core stability as evidenced by ability to perform TA activation hold for 5 seconds or greater. -goal met 8/6/2020  5. Pt will improve FOTO to 35% disability -goal met 8/6/2020     Long Term Goals: 8 weeks   1. Pt will improve FOTO to 25% disability or less in order to improve overall QOL -goal met 8/6/2020  2. Pt will report no pain in low back with daily activities  -progressing, not met   3. Pt will be able to perform yard work without pain in his low back  -progressing, not met   4. Pt will demonstrate ability to walk for 3 miles or greater for exercise without pain in low back -progressing, not met       Plan     Continue per PT POC with sessions tapered to once weekly for a POC of 4 weeks. Progress pt's HEP again at next session.     Plan of care Certification: 7/20/2020 to 10/16/2020.         Laurie Ovalle, PT , DPT  8/14/2020

## 2020-08-17 ENCOUNTER — CLINICAL SUPPORT (OUTPATIENT)
Dept: REHABILITATION | Facility: HOSPITAL | Age: 55
End: 2020-08-17
Payer: COMMERCIAL

## 2020-08-17 DIAGNOSIS — M54.50 CHRONIC MIDLINE LOW BACK PAIN WITHOUT SCIATICA: ICD-10-CM

## 2020-08-17 DIAGNOSIS — G89.29 CHRONIC MIDLINE LOW BACK PAIN WITHOUT SCIATICA: ICD-10-CM

## 2020-08-17 DIAGNOSIS — M47.816 SPONDYLOSIS OF LUMBAR SPINE: ICD-10-CM

## 2020-08-17 DIAGNOSIS — R26.2 DIFFICULTY WALKING: ICD-10-CM

## 2020-08-17 DIAGNOSIS — R53.1 GENERALIZED WEAKNESS: ICD-10-CM

## 2020-08-17 PROCEDURE — 97110 THERAPEUTIC EXERCISES: CPT

## 2020-08-18 ENCOUNTER — TELEPHONE (OUTPATIENT)
Dept: INTERNAL MEDICINE | Facility: CLINIC | Age: 55
End: 2020-08-18

## 2020-08-18 ENCOUNTER — PATIENT MESSAGE (OUTPATIENT)
Dept: INTERNAL MEDICINE | Facility: CLINIC | Age: 55
End: 2020-08-18

## 2020-08-18 NOTE — TELEPHONE ENCOUNTER
----- Message from Violette Hoang sent at 8/18/2020 11:50 AM CDT -----  Regarding: orders  Contact: pt  Caller is requesting a call back regarding a order of a colonoscopy.  Please call back at [phone] (home).  Thanks.

## 2020-08-18 NOTE — TELEPHONE ENCOUNTER
Spoke with patient and he stated that he had a call from the endoscopy scheduling department not too long ago to schedule his colonoscopy that Dr. Garcia ordered, however he didn't schedule at that time because his father had just passed away. Now he would like to schedule. Sent patient the endoscopy scheduling phone number through the patient portal because he was driving when he spoke with me.

## 2020-08-19 ENCOUNTER — TELEPHONE (OUTPATIENT)
Dept: ENDOSCOPY | Facility: HOSPITAL | Age: 55
End: 2020-08-19

## 2020-08-19 DIAGNOSIS — Z13.9 SCREENING PROCEDURE: Primary | ICD-10-CM

## 2020-08-19 RX ORDER — SODIUM, POTASSIUM,MAG SULFATES 17.5-3.13G
1 SOLUTION, RECONSTITUTED, ORAL ORAL DAILY
Qty: 1 KIT | Refills: 0 | Status: SHIPPED | OUTPATIENT
Start: 2020-08-19 | End: 2020-08-21

## 2020-08-19 RX ORDER — SODIUM, POTASSIUM,MAG SULFATES 17.5-3.13G
1 SOLUTION, RECONSTITUTED, ORAL ORAL DAILY
Qty: 1 KIT | Refills: 0 | Status: CANCELLED | OUTPATIENT
Start: 2020-08-19 | End: 2020-08-21

## 2020-08-19 NOTE — TELEPHONE ENCOUNTER
Location Screening:    If answers yes to any of the following, schedule at [facility] ONLY. If No, OK for either location.    1. Is there a diagnosis of heart failure, severe heart valve disease (aortic stenosis) or mechanical valve? no  a. Is the Left Ventricle Ejection Fraction <30% ? no    2. Does the pt have pulmonary hypertension? no   a. Is pulmonary arterial pressure gradient >50mmHg? no   b. Is there evidence of right ventricular dysfunction? no    3. Does the pt have achalasia? no    4. Any history of negative reaction to anesthesia? no   a. Myasthenia gravis? no   b. Malignant hyperthermia? No   c. Other? Yes. Pt stated that 10 years ago he was not given enough and felt everything    5. Is procedure for esophageal banding? no      COVID Screening    1. Have you had a fever in the last 7 days or have you used fever reducing medicines for a fever in the last 7 days?  no    2. Are you experiencing shortness of breath, cough, muscle aches, loss of taste or loss of smell?  no    If answered yes to questions 1 and 2, the patient must seek medical attention with their PCP.  Do not schedule their procedure.     3. Are you residing with anyone who has tested positive for Covid?  no    If answered yes to question 3, recommend 14 day self-quarantine from the date of relative's positive test and place special needs note in the depot.  Wait to schedule.     ENDO screening    1. Have you been admitted for cardiac, kidney or pulmonary causes to the hospital in the past 3 months? no   If yes, schedule an appointment with PCP before scheduling endoscopic procedure.     2. Have you had a stent placed in the last 12 months? no   If yes, for a screening visit, cancel and message the ordering provider.  The patient will need a new order when the time is appropriate.     3. Have you had a stroke or heart attack in the past 6 months? no   If yes, cancel and refer patient to ordering provider for clearance, also message ordering  "provider to inform.     4. Have you had any chest pain in the past 3 months? no   If yes, Have you been evaluated by your PCP and/or cardiologist and it was determined to not be heart related? no   If No, Pt needs to be seen by PCP or Cardiologist .  Pt can be scheduled once clearance obtained by either of those providers.     5. Do you take prescription weight loss medications?  no   If yes, must stop for 2 weeks prior to procedure.     6. Have you been diagnosed with diverticulitis within the past 3 months? no   If yes, must have been seen by GI within the last 3 months, if not schedule with GI ÁNGEL.    If pt has been seen by GI, schedule procedure 8-12 weeks post antibiotic treatment.     7. Are you on Dialysis? no  If yes, schedule procedure for the day AFTER dialysis.  Appt time should be 9am or later, patient arrival time is 2 hours prior.  Nulytely or miralax prep for all patients with kidney disease.     8. Are you diabetic?  no   If yes, schedule morning appt. Advise pt to hold all diabetic meds day of procedure.     9. If pt is older than 80 years of age and HAS NOT been seen by GI or PCP within the last 6 months, needs appt with GI ÁNGEL.   If pt has been seen by the GI provider or PCP within the past 6  months AND meets criteria, schedule procedure AND send message to the endoscopist.     10. Is patient on a "high risk" medication (blood thinner/antiplatelet agent)?  no   If yes, has cardiac clearance been obtained within the last 60 days? N/A   If no, a new clearance needs to be obtained.     Final Questions:    1.I have reviewed the last colonoscopy for recommendations regarding next procedure bowel prep.  yes  2. I have reviewed medications and allergies.  yes  3. I have verified the pharmacy information and appropriate prep sent if needed. yes  4. Prep instructions have been mailed or sent to portal per patient request. yes    09- kevin    All schedulers will have ability to reach out to the " ordering GI provider to clarify any issues.

## 2020-08-24 ENCOUNTER — TELEPHONE (OUTPATIENT)
Dept: ENDOSCOPY | Facility: HOSPITAL | Age: 55
End: 2020-08-24

## 2020-08-24 NOTE — TELEPHONE ENCOUNTER
Spoke with patient regarding procedure scheduled for 9/25/2020.  Patient requested cancellation of order d/t better insurance coverage at [facility].  Patient removed from schedule and order removed from system.

## 2020-08-24 NOTE — TELEPHONE ENCOUNTER
----- Message from Dominga Carroll LPN sent at 8/24/2020 11:04 AM CDT -----  Regarding: pt canceling appointment  Lease cancel endoscopy appointment for 9/25/2020.    Thank you

## 2020-08-28 NOTE — PROGRESS NOTES
Physical Therapy DIscharge Treatment Note     Name: Vinny Stover  [facility] Number: [id number]    Therapy Diagnosis:   Encounter Diagnoses   Name Primary?    Chronic midline low back pain without sciatica     Generalized weakness     Difficulty walking     Spondylosis of lumbar spine      Physician: Jany Palacio*    Visit Date: 8/31/2020    Physician Orders: PT Eval and Treat   Medical Diagnosis from Referral:   M54.9 (ICD-10-CM) - Dorsalgia, unspecified   M47.817 (ICD-10-CM) - Spondylosis without myelopathy or radiculopathy, lumbosacral region   Evaluation Date: 7/20/2020   PN DUE: 9/17/2020  Authorization Period Expiration: 7/31/21  Plan of Care Expiration: 10/16/2020  Visit # / Visits authorized:  9/ 30    Time In: 3:45 pm      Time Out: 4:40 pm        Total Billable Time: 45 minutes    Precautions: Standard       Subjective     Pt reports: that he is feeling much better. He states that he does not have any more back pain and that he has been able to do much more around his house. Pt states that he did a lot of cleaning around his home this weekend and was able to clean without any pain. He states that he has been very compliant with his HEP and feels that he is ready for discharge today.   He was compliant with home exercise program.  Response to previous treatment: felt good   Functional change: reports that he no longer has pain in his back     Pain: 0/10  Location:  Low back     Objective     Lumbar Range of Motion:     Degrees   Flexion 60      Extension 25      Left Side Bending 35   Right Side Bending 35   Left rotation    Min limitation   Right Rotation    Min limitation          Lower Extremity Strength   Right LE   Left LE     Knee extension: 5/5 Knee extension: 5/5   Knee flexion: 5/5 Knee flexion: 4+/5   Hip flexion: 5/5 Hip flexion: 4+/5   Hip extension:  5/5 Hip extension: 4/5   Hip abduction: 5/5 Hip abduction: 4+/5   Hip adduction: 5o/5 Hip adduction 4/5   Ankle dorsiflexion: 5/5  Ankle dorsiflexion: 5/5   Ankle plantarflexion: 5/5 Ankle plantarflexion: 5/5      FOTO: 17% disability      Vinny received therapeutic exercises (exercises performed today are bolded)   to develop strength, endurance, ROM, flexibility, posture and core stabilization for 45 minutes including:     Upright bike 5 minutes    DKTC with swiss ball x 20 reps   single leg bridge 2 x 15 reps each   bridges with band GTB 3 x 10 reps   SLR RTB 3 x 10 reps each   Clamshells RTB ball between feet 2 x 15   SL SLR 3 x 10 each     Resisted lateral walks RTB 2 laps of 20'   +[employer], [employer] and forward step downs on stairs 2 x 10 each LE     Freemotion:   Forward walk outs #30 x 10   Backwards walk outs #30 x 10   Anti-rotational press #40  x 15 each side  Lifts #20 x 15 each side   chops #30 x 15 each side  rotation #40 2 x 15 each side   rotation to pallof press #40 x 15 reps each side   half kneeling on blue foam pallof press #20  2 x 15 each direction    Farmer's carry #20 KB x 3 laps   Farmer's carry with hand off #15 KB x 3 laps   farmer's carry with #20 in one hand and #15 in the other squat to ground and hand off at end of each lap x 3 laps  squats to  #20 KB and place on tall rogue box, squat to place KB back down on the ground x 10 reps        Home Exercises Provided and Patient Education Provided      Education provided:   - educated pt of his progress with therapy and instructed him in new and revised HEP     Written Home Exercises Provided: yes.  Exercises were reviewed and Vinny was able to demonstrate them prior to the end of the session.  Vinny demonstrated good  understanding of the education provided.     See EMR under Patient Instructions for exercises provided 8/31/2020.    Assessment     Pt made great progress with skilled PT intervention. He has met all LTGs at this time and has returned to his PLOF. Pt with no complaints or difficulty with performance of exercise today and HEP was performed as part of  there-ex today. Pt is appropriate for discharge to [facility] and continued exercise performance at home with progression to return to gym after COVID restrictions are lifted. Pt to be discharged from skilled PT at this time.   Vinny is progressing well towards his goals.   Pt prognosis is Good.     Pt will continue to benefit from skilled outpatient physical therapy to address the deficits listed in the problem list box on initial evaluation, provide pt/family education and to maximize pt's level of independence in the home and community environment.     Pt's spiritual, cultural and educational needs considered and pt agreeable to plan of care and goals.     Anticipated barriers to physical therapy: none     Goals:  Short Term Goals: 4 weeks   1. Pt will be independent with HEP performance in order to continue PT progress at home. -goal met, 7/27/2020  2. Pt will improve LE strength to 4+/5 in all planes in order to improve gait performance and postural control -goal met, 8/17/2020  3. Pt will report pain at worst of 4/10 in low back  -goal met, 8/3/2020  4. Pt will demonstrate improved core stability as evidenced by ability to perform TA activation hold for 5 seconds or greater. -goal met 8/6/2020  5. Pt will improve FOTO to 35% disability -goal met 8/6/2020     Long Term Goals: 8 weeks   1. Pt will improve FOTO to 25% disability or less in order to improve overall QOL -goal met 8/6/2020  2. Pt will report no pain in low back with daily activities  -goal met, 8/31/2020   3. Pt will be able to perform yard work without pain in his low back  -goal met 8/31/2020   4. Pt will demonstrate ability to walk for 3 miles or greater for exercise without pain in low back -goal met, 8/31/2020       Plan     Pt discharged to [facility] today in order to continue his progress at home and with home workout routine. Pt instructed to call our office with any questions or concerns.          Laurie Ovalle, PT , DPT  8/31/2020

## 2020-08-31 ENCOUNTER — CLINICAL SUPPORT (OUTPATIENT)
Dept: REHABILITATION | Facility: HOSPITAL | Age: 55
End: 2020-08-31
Payer: COMMERCIAL

## 2020-08-31 DIAGNOSIS — M47.816 SPONDYLOSIS OF LUMBAR SPINE: ICD-10-CM

## 2020-08-31 DIAGNOSIS — M54.50 CHRONIC MIDLINE LOW BACK PAIN WITHOUT SCIATICA: ICD-10-CM

## 2020-08-31 DIAGNOSIS — R26.2 DIFFICULTY WALKING: ICD-10-CM

## 2020-08-31 DIAGNOSIS — G89.29 CHRONIC MIDLINE LOW BACK PAIN WITHOUT SCIATICA: ICD-10-CM

## 2020-08-31 DIAGNOSIS — R53.1 GENERALIZED WEAKNESS: ICD-10-CM

## 2020-08-31 PROCEDURE — 97110 THERAPEUTIC EXERCISES: CPT

## 2020-08-31 NOTE — PLAN OF CARE
Outpatient Therapy Discharge Summary     Name: Vinny Stover  [facility] Number: [id number]    Therapy Diagnosis:        Encounter Diagnoses   Name Primary?    Chronic midline low back pain without sciatica      Generalized weakness      Difficulty walking      Spondylosis of lumbar spine       Physician: Jany Palacio*     Visit Date: 8/31/2020     Physician Orders: PT Eval and Treat   Medical Diagnosis from Referral:   M54.9 (ICD-10-CM) - Dorsalgia, unspecified   M47.817 (ICD-10-CM) - Spondylosis without myelopathy or radiculopathy, lumbosacral region   Evaluation Date: 7/20/2020   PN DUE: 9/17/2020  Authorization Period Expiration: 7/31/21  Plan of Care Expiration: 10/16/2020  Visit # / Visits authorized:  9/ 30    Assessment      Pt made great progress with skilled PT intervention. He has met all LTGs at this time and has returned to his PLOF. Pt with no complaints or difficulty with performance of exercise today and HEP was performed as part of there-ex today. Pt is appropriate for discharge to HEP and continued exercise performance at home with progression to return to gym after COVID restrictions are lifted. Pt to be discharged from skilled PT at this time.   Vinny is progressing well towards his goals.   Pt prognosis is Good.      Pt will continue to benefit from skilled outpatient physical therapy to address the deficits listed in the problem list box on initial evaluation, provide pt/family education and to maximize pt's level of independence in the home and community environment.      Pt's spiritual, cultural and educational needs considered and pt agreeable to plan of care and goals.     Anticipated barriers to physical therapy: none      Goals:  Short Term Goals: 4 weeks   1. Pt will be independent with HEP performance in order to continue PT progress at home. -goal met, 7/27/2020  2. Pt will improve LE strength to 4+/5 in all planes in order to improve gait performance and postural  control -goal met, 8/17/2020  3. Pt will report pain at worst of 4/10 in low back  -goal met, 8/3/2020  4. Pt will demonstrate improved core stability as evidenced by ability to perform TA activation hold for 5 seconds or greater. -goal met 8/6/2020  5. Pt will improve FOTO to 35% disability -goal met 8/6/2020     Long Term Goals: 8 weeks   1. Pt will improve FOTO to 25% disability or less in order to improve overall QOL -goal met 8/6/2020  2. Pt will report no pain in low back with daily activities  -goal met, 8/31/2020   3. Pt will be able to perform yard work without pain in his low back  -goal met 8/31/2020   4. Pt will demonstrate ability to walk for 3 miles or greater for exercise without pain in low back -goal met, 8/31/2020         Plan      Pt discharged to [facility] today in order to continue his progress at home and with home workout routine. Pt instructed to call our office with any questions or concerns.            Laurie Ovalle, PT , DPT  8/31/2020

## 2020-09-18 LAB — CRC RECOMMENDATION EXT: NORMAL

## 2020-10-03 ENCOUNTER — CLINICAL SUPPORT (OUTPATIENT)
Dept: URGENT CARE | Facility: CLINIC | Age: 55
End: 2020-10-03
Payer: COMMERCIAL

## 2020-10-03 VITALS
HEART RATE: 86 BPM | DIASTOLIC BLOOD PRESSURE: 87 MMHG | WEIGHT: 180 LBS | BODY MASS INDEX: 29.99 KG/M2 | OXYGEN SATURATION: 97 % | HEIGHT: 65 IN | TEMPERATURE: 97 F | SYSTOLIC BLOOD PRESSURE: 136 MMHG

## 2020-10-03 DIAGNOSIS — Z23 INFLUENZA VACCINATION GIVEN: ICD-10-CM

## 2020-10-03 DIAGNOSIS — S76.012A HIP STRAIN, LEFT, INITIAL ENCOUNTER: Primary | ICD-10-CM

## 2020-10-03 PROCEDURE — 90471 FLU VACCINE (QUAD) GREATER THAN OR EQUAL TO 3YO PRESERVATIVE FREE IM: ICD-10-PCS | Mod: S$GLB,,, | Performed by: PHYSICIAN ASSISTANT

## 2020-10-03 PROCEDURE — 99214 PR OFFICE/OUTPT VISIT, EST, LEVL IV, 30-39 MIN: ICD-10-PCS | Mod: 25,S$GLB,, | Performed by: PHYSICIAN ASSISTANT

## 2020-10-03 PROCEDURE — 90686 FLU VACCINE (QUAD) GREATER THAN OR EQUAL TO 3YO PRESERVATIVE FREE IM: ICD-10-PCS | Mod: S$GLB,,, | Performed by: PHYSICIAN ASSISTANT

## 2020-10-03 PROCEDURE — 99214 OFFICE O/P EST MOD 30 MIN: CPT | Mod: 25,S$GLB,, | Performed by: PHYSICIAN ASSISTANT

## 2020-10-03 PROCEDURE — 90471 IMMUNIZATION ADMIN: CPT | Mod: S$GLB,,, | Performed by: PHYSICIAN ASSISTANT

## 2020-10-03 PROCEDURE — 90686 IIV4 VACC NO PRSV 0.5 ML IM: CPT | Mod: S$GLB,,, | Performed by: PHYSICIAN ASSISTANT

## 2020-10-03 RX ORDER — CYCLOBENZAPRINE HCL 10 MG
10 TABLET ORAL 3 TIMES DAILY PRN
Qty: 21 TABLET | Refills: 0 | Status: SHIPPED | OUTPATIENT
Start: 2020-10-03 | End: 2020-10-10

## 2020-10-03 NOTE — PATIENT INSTRUCTIONS
Hip Strain    You have a strain of the muscles around the hip joint. A muscle strain is a stretching or tearing of muscle fibers. This causes pain, especially when you move that muscle. There may also be some swelling and bruising.  Home care  · Stay off the injured leg as much as possible until you can walk on it without pain. If you have a lot of pain with walking, crutches or a walker may be prescribed. These can be rented or purchased at many pharmacies and surgical or orthopedic supply stores. Follow your healthcare provider's advice regarding when to begin putting weight on that leg.  · Apply an ice pack over the injured area for 15 to 20 minutes every 3 to 6 hours. You should do this for the first 24 to 48 hours. You can make an ice pack by filling a plastic bag that seals at the top with ice cubes and then wrapping it with a thin towel. Be careful not to injure your skin with the ice treatments. Ice should never be applied directly to skin. Continue the use of ice packs for relief of pain and swelling as needed. After 48 hours, apply heat (warm shower or warm bath) for 15 to 20 minutes several times a day, or alternate ice and heat.  · You may use over-the-counter pain medicine to control pain, unless another pain medicine was prescribed. If you have chronic liver or kidney disease or ever had a stomach ulcer or GI bleeding, talk with your healthcare provider beforeusing these medicines.  · If you play sports, you may resume these activities when you are able to hop and run on the injured leg without pain.  Follow-up care  Follow up with your healthcare provider, or as advised. If your symptoms do not begin to get better after a week, more tests may be needed.  If X-rays were taken, you will be told of any new findings that may affect your care.  When to seek medical advice  Call your healthcare provider right away if any of these occur:  · Increased swelling or bruising  · Increased pain  · Losing the  ability to put weight on the injured side  Date Last Reviewed: 11/19/2015  © 2586-5697 [employer]. [address] [place] [address], [place] [address]. All rights reserved. This information is not intended as a substitute for professional medical care. Always follow your healthcare professional's instructions.

## 2020-10-03 NOTE — PROGRESS NOTES
"Subjective:       Patient ID: Vinny Stover is a 55 y.o. male.    Vitals:  height is 5' 5" (1.651 m) and weight is 81.6 kg (180 lb). His tympanic temperature is 97.1 °F (36.2 °C). His blood pressure is 136/87 and his pulse is 86. His oxygen saturation is 97%.     Chief Complaint: Flank Pain (left )    Patient presents on today for left hip pain that began while walking this morning. Pt states he suffers from low back pain and has been walking a few miles in the mornings.  Patient states that during his walk this morning he began to have sharp pain in his left hip.  Pain is worse with walking or changing from sitting to standing position.  Pain does not radiate.  Patient took Advil prior to arrival with no relief.  Patient denies any numbness/tingling, weakness, trauma or injury, swelling, decreased range of motion.    Pt also requesting flu shot.     Flank Pain  This is a new problem. The current episode started today. The problem occurs constantly. The problem has been gradually worsening since onset. The quality of the pain is described as stabbing. The pain does not radiate. The pain is at a severity of 7/10. The pain is moderate. The symptoms are aggravated by standing. Stiffness is present all day.       Constitution: Negative.   HENT: Negative.    Neck: negative.   Cardiovascular: Negative.    Respiratory: Negative.    Gastrointestinal: Negative.    Endocrine: negative.   Genitourinary: Positive for flank pain.   Musculoskeletal: Positive for pain.   Skin: Negative.  Negative for erythema.   Allergic/Immunologic: Negative.    Neurological: Negative.    Hematologic/Lymphatic: Negative.    Psychiatric/Behavioral: Negative.        Objective:      Physical Exam   Constitutional: He is oriented to person, place, and time. He appears well-developed.   HENT:   Head: Normocephalic and atraumatic. Head is without abrasion, without contusion and without laceration.   Ears:   Right Ear: External ear normal.   Left " Ear: External ear normal.   Nose: Nose normal.   Mouth/Throat: Oropharynx is clear and moist and mucous membranes are normal.   Eyes: Pupils are equal, round, and reactive to light. Conjunctivae, EOM and lids are normal.   Neck: Trachea normal, full passive range of motion without pain and phonation normal. Neck supple.   Cardiovascular: Normal rate, regular rhythm and normal heart sounds.   Pulmonary/Chest: Effort normal and breath sounds normal. No stridor. No respiratory distress.   Musculoskeletal: Normal range of motion.      Left hip: He exhibits bony tenderness (iliac crest). He exhibits normal range of motion (pain with abduction), normal strength, no swelling and no crepitus.   Neurological: He is alert and oriented to person, place, and time.   Skin: Skin is warm, dry, intact and no rash. Capillary refill takes less than 2 seconds. abrasion, burn, bruising, erythema and ecchymosisPsychiatric: His speech is normal and behavior is normal. Judgment and thought content normal.   Nursing note and vitals reviewed.        Assessment:       1. Hip strain, left, initial encounter    2. Influenza vaccination given        Plan:       Pt states he has Mobic at home. Recommend to take either mobic or advil prn. Pt has had relief with flexeril in the past, take prn. Pt declined toradol injection at this time. Rest and ice area. Recommend to f/u with PCP if no improvement.   Hip strain, left, initial encounter  -     cyclobenzaprine (FLEXERIL) 10 MG tablet; Take 1 tablet (10 mg total) by mouth 3 (three) times daily as needed for Muscle spasms.  Dispense: 21 tablet; Refill: 0    Influenza vaccination given  -     Influenza - Quadrivalent *Preferred* (6 months+) (PF)      Patient Instructions     Hip Strain    You have a strain of the muscles around the hip joint. A muscle strain is a stretching or tearing of muscle fibers. This causes pain, especially when you move that muscle. There may also be some swelling and  bruising.  Home care  · Stay off the injured leg as much as possible until you can walk on it without pain. If you have a lot of pain with walking, crutches or a walker may be prescribed. These can be rented or purchased at many pharmacies and surgical or orthopedic supply stores. Follow your healthcare provider's advice regarding when to begin putting weight on that leg.  · Apply an ice pack over the injured area for 15 to 20 minutes every 3 to 6 hours. You should do this for the first 24 to 48 hours. You can make an ice pack by filling a plastic bag that seals at the top with ice cubes and then wrapping it with a thin towel. Be careful not to injure your skin with the ice treatments. Ice should never be applied directly to skin. Continue the use of ice packs for relief of pain and swelling as needed. After 48 hours, apply heat (warm shower or warm bath) for 15 to 20 minutes several times a day, or alternate ice and heat.  · You may use over-the-counter pain medicine to control pain, unless another pain medicine was prescribed. If you have chronic liver or kidney disease or ever had a stomach ulcer or GI bleeding, talk with your healthcare provider beforeusing these medicines.  · If you play sports, you may resume these activities when you are able to hop and run on the injured leg without pain.  Follow-up care  Follow up with your healthcare provider, or as advised. If your symptoms do not begin to get better after a week, more tests may be needed.  If X-rays were taken, you will be told of any new findings that may affect your care.  When to seek medical advice  Call your healthcare provider right away if any of these occur:  · Increased swelling or bruising  · Increased pain  · Losing the ability to put weight on the injured side  Date Last Reviewed: 11/19/2015  © 3310-9847 [employer]. [address] [place] [address], [place] [address]. All rights reserved. This information is not intended as a substitute  for professional medical care. Always follow your healthcare professional's instructions.

## 2020-10-22 ENCOUNTER — PATIENT MESSAGE (OUTPATIENT)
Dept: INTERNAL MEDICINE | Facility: CLINIC | Age: 55
End: 2020-10-22

## 2020-10-24 NOTE — TELEPHONE ENCOUNTER
Vinny Negrete.    I'm sorry to hear about the death in your family.    I am sending a message to the sleep clinic asking them if they can act on my earlier orders.    I reviewed your chart, and I noticed that you are due for your annual wellness exam. Please schedule an appointment with me for your annual wellness exam at your earliest convenience so your treatment will not be interrupted.    You can schedule your appointment from your [facility] account or from the [facility] on your smartphone or by calling our appointment desk at [phone]. If you have any difficulty, send my staff a message, and they will be glad to help.    At your appointment, I will examine your heart, lungs, arteries, and abdomen and take care of any preventive health measures you need. If the sleep clinic needs more current documentation proceed, we can take care of it then.    Most health insurances allow for one free wellness exam per year, and some employers offer premium discounts for employees who get their annual wellness exam. If possible, we will let this appointment to be your yearly wellness exam.    Thanks for letting me care for you, and thanks for [facility].    I look forward to seeing you at your next appointment. Until then, stay safe, take care, and be well.     Sincerely,    GAL Garcia MD

## 2020-10-26 DIAGNOSIS — G47.33 OBSTRUCTIVE SLEEP APNEA SYNDROME: Primary | Chronic | ICD-10-CM

## 2021-03-02 ENCOUNTER — TELEPHONE (OUTPATIENT)
Dept: PRIMARY CARE CLINIC | Facility: CLINIC | Age: 56
End: 2021-03-02

## 2021-03-02 ENCOUNTER — OFFICE VISIT (OUTPATIENT)
Dept: PRIMARY CARE CLINIC | Facility: CLINIC | Age: 56
End: 2021-03-02
Payer: COMMERCIAL

## 2021-03-02 VITALS
SYSTOLIC BLOOD PRESSURE: 110 MMHG | WEIGHT: 187.19 LBS | HEIGHT: 65 IN | DIASTOLIC BLOOD PRESSURE: 74 MMHG | BODY MASS INDEX: 31.19 KG/M2 | TEMPERATURE: 97 F | HEART RATE: 55 BPM

## 2021-03-02 DIAGNOSIS — M54.50 ACUTE LOW BACK PAIN WITHOUT SCIATICA, UNSPECIFIED BACK PAIN LATERALITY: ICD-10-CM

## 2021-03-02 PROCEDURE — 99999 PR PBB SHADOW E&M-EST. PATIENT-LVL III: ICD-10-PCS | Mod: PBBFAC,,, | Performed by: PHYSICIAN ASSISTANT

## 2021-03-02 PROCEDURE — 99999 PR PBB SHADOW E&M-EST. PATIENT-LVL III: CPT | Mod: PBBFAC,,, | Performed by: PHYSICIAN ASSISTANT

## 2021-03-02 PROCEDURE — 99214 PR OFFICE/OUTPT VISIT, EST, LEVL IV, 30-39 MIN: ICD-10-PCS | Mod: S$GLB,,, | Performed by: PHYSICIAN ASSISTANT

## 2021-03-02 PROCEDURE — 99214 OFFICE O/P EST MOD 30 MIN: CPT | Mod: S$GLB,,, | Performed by: PHYSICIAN ASSISTANT

## 2021-03-02 RX ORDER — MELOXICAM 15 MG/1
TABLET ORAL
Qty: 30 TABLET | Refills: 0 | Status: SHIPPED | OUTPATIENT
Start: 2021-03-02 | End: 2021-07-29 | Stop reason: ALTCHOICE

## 2021-03-02 RX ORDER — CYCLOBENZAPRINE HCL 10 MG
10 TABLET ORAL 3 TIMES DAILY PRN
Qty: 30 TABLET | Refills: 0 | Status: SHIPPED | OUTPATIENT
Start: 2021-03-02 | End: 2021-03-12

## 2021-04-28 ENCOUNTER — PATIENT MESSAGE (OUTPATIENT)
Dept: RESEARCH | Facility: HOSPITAL | Age: 56
End: 2021-04-28

## 2021-06-10 ENCOUNTER — OFFICE VISIT (OUTPATIENT)
Dept: DERMATOLOGY | Facility: CLINIC | Age: 56
End: 2021-06-10
Payer: COMMERCIAL

## 2021-06-10 DIAGNOSIS — L30.9 DERMATITIS: Primary | ICD-10-CM

## 2021-06-10 DIAGNOSIS — D22.9 MULTIPLE BENIGN NEVI: ICD-10-CM

## 2021-06-10 DIAGNOSIS — H02.9 EYELID LESION: ICD-10-CM

## 2021-06-10 DIAGNOSIS — L82.1 SEBORRHEIC KERATOSES: ICD-10-CM

## 2021-06-10 PROCEDURE — 99999 PR PBB SHADOW E&M-EST. PATIENT-LVL II: CPT | Mod: PBBFAC,,, | Performed by: PHYSICIAN ASSISTANT

## 2021-06-10 PROCEDURE — 99999 PR PBB SHADOW E&M-EST. PATIENT-LVL II: ICD-10-PCS | Mod: PBBFAC,,, | Performed by: PHYSICIAN ASSISTANT

## 2021-06-10 PROCEDURE — 99214 OFFICE O/P EST MOD 30 MIN: CPT | Mod: S$GLB,,, | Performed by: PHYSICIAN ASSISTANT

## 2021-06-10 PROCEDURE — 99214 PR OFFICE/OUTPT VISIT, EST, LEVL IV, 30-39 MIN: ICD-10-PCS | Mod: S$GLB,,, | Performed by: PHYSICIAN ASSISTANT

## 2021-06-10 RX ORDER — TRIAMCINOLONE ACETONIDE 0.25 MG/G
CREAM TOPICAL 2 TIMES DAILY
Qty: 30 G | Refills: 0 | Status: SHIPPED | OUTPATIENT
Start: 2021-06-10 | End: 2021-10-19 | Stop reason: ALTCHOICE

## 2021-06-10 RX ORDER — MICONAZOLE NITRATE 20 MG/G
POWDER TOPICAL
Qty: 85 G | Refills: 11 | Status: SHIPPED | OUTPATIENT
Start: 2021-06-10 | End: 2021-10-19 | Stop reason: ALTCHOICE

## 2021-06-10 RX ORDER — KETOCONAZOLE 20 MG/G
CREAM TOPICAL 2 TIMES DAILY
Qty: 60 G | Refills: 0 | Status: SHIPPED | OUTPATIENT
Start: 2021-06-10 | End: 2021-10-19 | Stop reason: ALTCHOICE

## 2021-07-29 ENCOUNTER — PATIENT MESSAGE (OUTPATIENT)
Dept: INTERNAL MEDICINE | Facility: CLINIC | Age: 56
End: 2021-07-29

## 2021-07-29 ENCOUNTER — OFFICE VISIT (OUTPATIENT)
Dept: INTERNAL MEDICINE | Facility: CLINIC | Age: 56
End: 2021-07-29
Payer: COMMERCIAL

## 2021-07-29 VITALS
TEMPERATURE: 99 F | SYSTOLIC BLOOD PRESSURE: 130 MMHG | BODY MASS INDEX: 31.04 KG/M2 | DIASTOLIC BLOOD PRESSURE: 74 MMHG | OXYGEN SATURATION: 97 % | HEIGHT: 65 IN | HEART RATE: 64 BPM | WEIGHT: 186.31 LBS

## 2021-07-29 DIAGNOSIS — M54.50 ACUTE LOW BACK PAIN WITHOUT SCIATICA, UNSPECIFIED BACK PAIN LATERALITY: ICD-10-CM

## 2021-07-29 PROCEDURE — 99214 PR OFFICE/OUTPT VISIT, EST, LEVL IV, 30-39 MIN: ICD-10-PCS | Mod: S$GLB,,, | Performed by: PHYSICIAN ASSISTANT

## 2021-07-29 PROCEDURE — 99999 PR PBB SHADOW E&M-EST. PATIENT-LVL III: CPT | Mod: PBBFAC,,, | Performed by: PHYSICIAN ASSISTANT

## 2021-07-29 PROCEDURE — 99214 OFFICE O/P EST MOD 30 MIN: CPT | Mod: S$GLB,,, | Performed by: PHYSICIAN ASSISTANT

## 2021-07-29 PROCEDURE — 99999 PR PBB SHADOW E&M-EST. PATIENT-LVL III: ICD-10-PCS | Mod: PBBFAC,,, | Performed by: PHYSICIAN ASSISTANT

## 2021-07-29 RX ORDER — MELOXICAM 15 MG/1
15 TABLET ORAL DAILY PRN
Qty: 30 TABLET | Refills: 1 | Status: SHIPPED | OUTPATIENT
Start: 2021-07-29 | End: 2021-07-29 | Stop reason: SDUPTHER

## 2021-07-29 RX ORDER — MELOXICAM 15 MG/1
15 TABLET ORAL DAILY PRN
Qty: 30 TABLET | Refills: 1 | Status: SHIPPED | OUTPATIENT
Start: 2021-07-29 | End: 2022-04-22

## 2021-07-29 RX ORDER — CYCLOBENZAPRINE HCL 10 MG
5 TABLET ORAL 3 TIMES DAILY PRN
Qty: 30 TABLET | Refills: 1 | Status: SHIPPED | OUTPATIENT
Start: 2021-07-29 | End: 2022-04-12

## 2021-07-29 RX ORDER — CYCLOBENZAPRINE HCL 10 MG
5 TABLET ORAL 3 TIMES DAILY PRN
Qty: 30 TABLET | Refills: 1 | Status: SHIPPED | OUTPATIENT
Start: 2021-07-29 | End: 2021-07-29 | Stop reason: SDUPTHER

## 2021-10-19 ENCOUNTER — OFFICE VISIT (OUTPATIENT)
Dept: PRIMARY CARE CLINIC | Facility: CLINIC | Age: 56
End: 2021-10-19
Payer: COMMERCIAL

## 2021-10-19 VITALS
SYSTOLIC BLOOD PRESSURE: 124 MMHG | BODY MASS INDEX: 31 KG/M2 | WEIGHT: 186.31 LBS | OXYGEN SATURATION: 97 % | DIASTOLIC BLOOD PRESSURE: 78 MMHG | HEART RATE: 64 BPM | TEMPERATURE: 97 F

## 2021-10-19 DIAGNOSIS — S29.012A STRAIN OF LATISSIMUS DORSI MUSCLE, INITIAL ENCOUNTER: Primary | ICD-10-CM

## 2021-10-19 DIAGNOSIS — M25.511 ACUTE PAIN OF RIGHT SHOULDER: ICD-10-CM

## 2021-10-19 PROCEDURE — 99999 PR PBB SHADOW E&M-EST. PATIENT-LVL III: CPT | Mod: PBBFAC,,, | Performed by: NURSE PRACTITIONER

## 2021-10-19 PROCEDURE — 99213 PR OFFICE/OUTPT VISIT, EST, LEVL III, 20-29 MIN: ICD-10-PCS | Mod: S$GLB,,, | Performed by: NURSE PRACTITIONER

## 2021-10-19 PROCEDURE — 99213 OFFICE O/P EST LOW 20 MIN: CPT | Mod: S$GLB,,, | Performed by: NURSE PRACTITIONER

## 2021-10-19 PROCEDURE — 99999 PR PBB SHADOW E&M-EST. PATIENT-LVL III: ICD-10-PCS | Mod: PBBFAC,,, | Performed by: NURSE PRACTITIONER

## 2021-10-19 RX ORDER — METHYLPREDNISOLONE 4 MG/1
TABLET ORAL
Qty: 1 PACKAGE | Refills: 0 | Status: SHIPPED | OUTPATIENT
Start: 2021-10-19 | End: 2021-12-09

## 2021-12-01 ENCOUNTER — OFFICE VISIT (OUTPATIENT)
Dept: URGENT CARE | Facility: CLINIC | Age: 56
End: 2021-12-01
Payer: COMMERCIAL

## 2021-12-01 ENCOUNTER — HOSPITAL ENCOUNTER (OUTPATIENT)
Dept: RADIOLOGY | Facility: CLINIC | Age: 56
Discharge: HOME | End: 2021-12-01
Attending: PHYSICIAN ASSISTANT
Payer: COMMERCIAL

## 2021-12-01 VITALS
DIASTOLIC BLOOD PRESSURE: 63 MMHG | RESPIRATION RATE: 20 BRPM | HEART RATE: 74 BPM | SYSTOLIC BLOOD PRESSURE: 129 MMHG | BODY MASS INDEX: 30.99 KG/M2 | OXYGEN SATURATION: 97 % | TEMPERATURE: 99 F | HEIGHT: 65 IN | WEIGHT: 186 LBS

## 2021-12-01 DIAGNOSIS — J06.9 VIRAL URI: Primary | ICD-10-CM

## 2021-12-01 DIAGNOSIS — R05.9 COUGH: ICD-10-CM

## 2021-12-01 DIAGNOSIS — R09.89 CHEST CONGESTION: ICD-10-CM

## 2021-12-01 LAB
CTP QC/QA: YES
SARS-COV-2 RDRP RESP QL NAA+PROBE: NEGATIVE

## 2021-12-01 PROCEDURE — 71046 XR CHEST PA AND LATERAL: ICD-10-PCS | Mod: S$GLB,,, | Performed by: RADIOLOGY

## 2021-12-01 PROCEDURE — U0002: ICD-10-PCS | Mod: QW,S$GLB,, | Performed by: PHYSICIAN ASSISTANT

## 2021-12-01 PROCEDURE — U0002 COVID-19 LAB TEST NON-CDC: HCPCS | Mod: QW,S$GLB,, | Performed by: PHYSICIAN ASSISTANT

## 2021-12-01 PROCEDURE — 71046 X-RAY EXAM CHEST 2 VIEWS: CPT | Mod: S$GLB,,, | Performed by: RADIOLOGY

## 2021-12-01 PROCEDURE — 99214 PR OFFICE/OUTPT VISIT, EST, LEVL IV, 30-39 MIN: ICD-10-PCS | Mod: S$GLB,,, | Performed by: PHYSICIAN ASSISTANT

## 2021-12-01 PROCEDURE — 99214 OFFICE O/P EST MOD 30 MIN: CPT | Mod: S$GLB,,, | Performed by: PHYSICIAN ASSISTANT

## 2021-12-01 RX ORDER — PROMETHAZINE HYDROCHLORIDE AND DEXTROMETHORPHAN HYDROBROMIDE 6.25; 15 MG/5ML; MG/5ML
5 SYRUP ORAL EVERY 6 HOURS PRN
Qty: 118 ML | Refills: 0 | Status: SHIPPED | OUTPATIENT
Start: 2021-12-01 | End: 2022-03-10

## 2021-12-04 ENCOUNTER — TELEPHONE (OUTPATIENT)
Dept: URGENT CARE | Facility: CLINIC | Age: 56
End: 2021-12-04
Payer: COMMERCIAL

## 2021-12-09 ENCOUNTER — OFFICE VISIT (OUTPATIENT)
Dept: INTERNAL MEDICINE | Facility: CLINIC | Age: 56
End: 2021-12-09
Payer: COMMERCIAL

## 2021-12-09 VITALS
TEMPERATURE: 99 F | BODY MASS INDEX: 31.11 KG/M2 | DIASTOLIC BLOOD PRESSURE: 78 MMHG | OXYGEN SATURATION: 95 % | WEIGHT: 186.75 LBS | HEIGHT: 65 IN | SYSTOLIC BLOOD PRESSURE: 110 MMHG | HEART RATE: 86 BPM

## 2021-12-09 DIAGNOSIS — J32.9 SINUSITIS, UNSPECIFIED CHRONICITY, UNSPECIFIED LOCATION: Primary | ICD-10-CM

## 2021-12-09 PROCEDURE — 99214 PR OFFICE/OUTPT VISIT, EST, LEVL IV, 30-39 MIN: ICD-10-PCS | Mod: S$GLB,,, | Performed by: FAMILY MEDICINE

## 2021-12-09 PROCEDURE — 99214 OFFICE O/P EST MOD 30 MIN: CPT | Mod: S$GLB,,, | Performed by: FAMILY MEDICINE

## 2021-12-09 PROCEDURE — 99999 PR PBB SHADOW E&M-EST. PATIENT-LVL III: ICD-10-PCS | Mod: PBBFAC,,, | Performed by: FAMILY MEDICINE

## 2021-12-09 PROCEDURE — 99999 PR PBB SHADOW E&M-EST. PATIENT-LVL III: CPT | Mod: PBBFAC,,, | Performed by: FAMILY MEDICINE

## 2021-12-09 RX ORDER — AMOXICILLIN AND CLAVULANATE POTASSIUM 875; 125 MG/1; MG/1
1 TABLET, FILM COATED ORAL 2 TIMES DAILY
Qty: 20 TABLET | Refills: 0 | Status: SHIPPED | OUTPATIENT
Start: 2021-12-09 | End: 2021-12-19

## 2021-12-09 RX ORDER — AMOXICILLIN AND CLAVULANATE POTASSIUM 875; 125 MG/1; MG/1
1 TABLET, FILM COATED ORAL 2 TIMES DAILY
Qty: 20 TABLET | Refills: 0 | Status: SHIPPED | OUTPATIENT
Start: 2021-12-09 | End: 2021-12-09

## 2021-12-09 RX ORDER — MELOXICAM 15 MG/1
15 TABLET ORAL
COMMUNITY
End: 2022-03-22 | Stop reason: SDUPTHER

## 2022-03-08 ENCOUNTER — OFFICE VISIT (OUTPATIENT)
Dept: URGENT CARE | Facility: CLINIC | Age: 57
End: 2022-03-08
Payer: COMMERCIAL

## 2022-03-08 VITALS
SYSTOLIC BLOOD PRESSURE: 122 MMHG | TEMPERATURE: 99 F | RESPIRATION RATE: 16 BRPM | OXYGEN SATURATION: 99 % | HEART RATE: 61 BPM | DIASTOLIC BLOOD PRESSURE: 59 MMHG | HEIGHT: 65 IN | WEIGHT: 180 LBS | BODY MASS INDEX: 29.99 KG/M2

## 2022-03-08 DIAGNOSIS — R42 VERTIGO: Primary | ICD-10-CM

## 2022-03-08 LAB — GLUCOSE SERPL-MCNC: 91 MG/DL (ref 70–110)

## 2022-03-08 PROCEDURE — 93005 EKG 12-LEAD: ICD-10-PCS | Mod: S$GLB,,, | Performed by: STUDENT IN AN ORGANIZED HEALTH CARE EDUCATION/TRAINING PROGRAM

## 2022-03-08 PROCEDURE — 82962 GLUCOSE BLOOD TEST: CPT | Mod: S$GLB,,, | Performed by: STUDENT IN AN ORGANIZED HEALTH CARE EDUCATION/TRAINING PROGRAM

## 2022-03-08 PROCEDURE — 82962 POCT GLUCOSE, HAND-HELD DEVICE: ICD-10-PCS | Mod: S$GLB,,, | Performed by: STUDENT IN AN ORGANIZED HEALTH CARE EDUCATION/TRAINING PROGRAM

## 2022-03-08 PROCEDURE — 93005 ELECTROCARDIOGRAM TRACING: CPT | Mod: S$GLB,,, | Performed by: STUDENT IN AN ORGANIZED HEALTH CARE EDUCATION/TRAINING PROGRAM

## 2022-03-08 PROCEDURE — 99213 OFFICE O/P EST LOW 20 MIN: CPT | Mod: S$GLB,,, | Performed by: STUDENT IN AN ORGANIZED HEALTH CARE EDUCATION/TRAINING PROGRAM

## 2022-03-08 PROCEDURE — 93010 EKG 12-LEAD: ICD-10-PCS | Mod: S$GLB,,, | Performed by: STUDENT IN AN ORGANIZED HEALTH CARE EDUCATION/TRAINING PROGRAM

## 2022-03-08 PROCEDURE — 99213 PR OFFICE/OUTPT VISIT, EST, LEVL III, 20-29 MIN: ICD-10-PCS | Mod: S$GLB,,, | Performed by: STUDENT IN AN ORGANIZED HEALTH CARE EDUCATION/TRAINING PROGRAM

## 2022-03-08 PROCEDURE — 93010 ELECTROCARDIOGRAM REPORT: CPT | Mod: S$GLB,,, | Performed by: STUDENT IN AN ORGANIZED HEALTH CARE EDUCATION/TRAINING PROGRAM

## 2022-03-08 NOTE — PROGRESS NOTES
"Subjective:       Patient ID: Vinny Stover is a 56 y.o. male.    Vitals:  height is 5' 5" (1.651 m) and weight is 81.6 kg (180 lb). His temperature is 98.8 °F (37.1 °C). His blood pressure is 139/66. His respiration is 16 and oxygen saturation is 99%.     Chief Complaint: Dizziness    Pt presents for episode of dizziness. Reports in 12/21 after URI had episode of acute dizziness with blurry vision, feeling feint, and HA while walking from desk to get coffee. Did not have recurrence until this Saturday when he had similar episode while standing at his kitchen counter with acute onset of HA then dizziness, chest tightness, and near syncope. Episode lasted ~5 minutes. No focal deficits during episode though pt does report brief episode of L arm tingling yesterday while playing board game but was otherwise asymptomatic at the time. Denied f/c, emesis, palpitations, CP, syncope, diaphoresis, SoB, peripheral edema, confusion, hearing changes, recent URI.    Dizziness:   Chronicity:  Recurrent  Onset:  In the past 7 days  Progression since onset:  Resolved  Frequency - weeks/days included: twice in last 3 mo.  Pain Scale:  0/10  Severity:  Initially severe, but improved  Duration:  5 minutes  Dizziness characteristics:  Spacial disorientation, sensation of movement, off-balance, walking on uneven surface, trouble focusing eyes and lightheaded/impending faint  Initial Spell Date and Length:  10 minutes  Time frame: q3mo.  Duration of Spells:  10 minutes   Associated symptoms: headaches, visual disturbances and light-headedness.no hearing loss, no ear congestion, no ear pain, no fever, no tinnitus, no nausea, no vomiting, no diaphoresis, no weakness, no syncope, no palpitations, no panic, no facial weakness, no slurred speech, no numbness in extremities (occured next day and unassociated with dizziness) and no chest pain.  Aggravated by:  Nothing  Risk factors:  Travel  Treatments tried:  Rest  Improvements on treatment: " " Resolvedno strokes, no cardiac surgery, no neurologic disease, no head trauma, no head trauma, no anxiety, no MRI head and no CT head.      Constitution: Negative for chills, sweating and fever.   HENT: Negative for ear pain, tinnitus, hearing loss, congestion, sinus pain, sinus pressure, sore throat, trouble swallowing and voice change.    Neck: Negative for neck pain and neck stiffness.   Cardiovascular: Negative for chest pain, leg swelling, palpitations, sob on exertion and passing out.   Eyes: Positive for blurred vision (during episode). Negative for eye pain, photophobia and vision loss.   Respiratory: Positive for chest tightness (during episode). Negative for cough and shortness of breath.    Gastrointestinal: Negative for nausea and vomiting.   Musculoskeletal: Negative for muscle ache.   Skin: Negative for rash.   Neurological: Positive for dizziness, light-headedness and headaches. Negative for history of vertigo, passing out, facial drooping, altered mental status, loss of consciousness and numbness.   Psychiatric/Behavioral: Negative for altered mental status and confusion.       Objective:       Vitals:    03/08/22 1128 03/08/22 1142   BP: 139/66 (!) 122/59   Pulse: 61    Resp: 16    Temp: 98.8 °F (37.1 °C)    SpO2: 99%    Weight: 81.6 kg (180 lb)    Height: 5' 5" (1.651 m)      Physical Exam   Constitutional: He is oriented to person, place, and time. He appears well-developed. He does not appear ill. No distress.   HENT:   Head: Normocephalic and atraumatic.   Ears:   Right Ear: Hearing and external ear normal.   Left Ear: Hearing and external ear normal.   Eyes: Conjunctivae and EOM are normal. Pupils are equal, round, and reactive to light. Right eye exhibits no discharge. Left eye exhibits no discharge.      extraocular movement intact   Neck: Neck supple.   Cardiovascular: Normal rate, regular rhythm and normal heart sounds.   Pulmonary/Chest: Effort normal and breath sounds normal. "   Musculoskeletal: Normal range of motion.         General: Normal range of motion.      Right lower leg: No edema.      Left lower leg: No edema.   Neurological: no focal deficit. He is alert and oriented to person, place, and time. He displays no weakness. No cranial nerve deficit (CN II-XII intact) or sensory deficit. He exhibits normal muscle tone. Coordination normal.   Skin: Skin is warm, dry and no rash.   Psychiatric: His behavior is normal. Judgment and thought content normal.   Nursing note and vitals reviewed.    Recent Lab Results       03/08/22  1216        POC Glucose 91           EKG: NSR without ectopy, no acute ST/TW changes compared to prior from 2012 ( physician interpretation)      Assessment:       1. Vertigo          Plan:         Vertigo  -     POCT Glucose, Hand-Held Device  -     IN OFFICE EKG 12-LEAD (to [place])  -     Ambulatory referral/consult to Internal Medicine  - unclear etiology of x2 episodes of dizziness/vertigo; current VSS on RA, negative orthostatics, normal glucose, unchanged EKG, reviewed these results with patient; pt exam and results reassuring and pt counseled on follow-up for further outpt work-up of vertigo episodes with ER return precautions given. Questions answered and pt expressed understanding.    Follow up in 2 days (on 3/10/2022) for re-evaluation with Internal Medicine, or sooner with ER if worsening.    Neel Cortes MD/MPH  [facility]

## 2022-03-10 ENCOUNTER — OFFICE VISIT (OUTPATIENT)
Dept: INTERNAL MEDICINE | Facility: CLINIC | Age: 57
End: 2022-03-10
Payer: COMMERCIAL

## 2022-03-10 VITALS
SYSTOLIC BLOOD PRESSURE: 110 MMHG | WEIGHT: 183.56 LBS | BODY MASS INDEX: 30.58 KG/M2 | HEART RATE: 97 BPM | DIASTOLIC BLOOD PRESSURE: 52 MMHG | OXYGEN SATURATION: 97 % | HEIGHT: 65 IN | TEMPERATURE: 99 F

## 2022-03-10 DIAGNOSIS — R42 VERTIGO: Primary | ICD-10-CM

## 2022-03-10 DIAGNOSIS — J30.2 SEASONAL ALLERGIES: ICD-10-CM

## 2022-03-10 PROCEDURE — 99999 PR PBB SHADOW E&M-EST. PATIENT-LVL IV: ICD-10-PCS | Mod: PBBFAC,,, | Performed by: PHYSICIAN ASSISTANT

## 2022-03-10 PROCEDURE — 99213 PR OFFICE/OUTPT VISIT, EST, LEVL III, 20-29 MIN: ICD-10-PCS | Mod: S$GLB,,, | Performed by: PHYSICIAN ASSISTANT

## 2022-03-10 PROCEDURE — 99999 PR PBB SHADOW E&M-EST. PATIENT-LVL IV: CPT | Mod: PBBFAC,,, | Performed by: PHYSICIAN ASSISTANT

## 2022-03-10 PROCEDURE — 99213 OFFICE O/P EST LOW 20 MIN: CPT | Mod: S$GLB,,, | Performed by: PHYSICIAN ASSISTANT

## 2022-03-10 RX ORDER — LEVOCETIRIZINE DIHYDROCHLORIDE 5 MG/1
5 TABLET, FILM COATED ORAL NIGHTLY
Qty: 30 TABLET | Refills: 0 | Status: SHIPPED | OUTPATIENT
Start: 2022-03-10 | End: 2022-06-17

## 2022-03-10 NOTE — PROGRESS NOTES
Subjective:       Patient ID: Vinny Stover is a 56 y.o. male.    Chief Complaint: Dizziness    Patient Care Team:  GAL Garcia MD as PCP - General (Family Medicine)  Markell Mosley MD as Consulting Physician (Gastroenterology)      Patient is new to me.    Seen in  on 3/8 for dizziness, normal EKG and glucose. PT has not had another episode of dizziness. He reports PND for a few days. Denies N/V. Reports headache with dizzy spells and blurred vision but all have resolved.     Dizziness: no ear pain, no fever, no headaches, no tinnitus, no weakness and no chest pain.    Review of Systems   Constitutional: Negative for chills, fatigue, fever and unexpected weight change.   HENT: Positive for postnasal drip. Negative for congestion, ear pain and tinnitus.    Eyes: Negative for visual disturbance.   Respiratory: Negative for shortness of breath.    Cardiovascular: Negative for chest pain.   Musculoskeletal: Negative for myalgias.   Neurological: Positive for dizziness. Negative for facial asymmetry, speech difficulty, weakness, numbness and headaches.       Objective:      Physical Exam  Vitals and nursing note reviewed.   Constitutional:       General: He is not in acute distress.     Appearance: He is well-developed.   HENT:      Head: Normocephalic and atraumatic.      Right Ear: Ear canal and external ear normal. A middle ear effusion is present.      Left Ear: Ear canal and external ear normal. A middle ear effusion is present.      Nose: Nose normal.      Mouth/Throat:      Comments: Clear PND noted to posterior pharynx  Eyes:      General: Lids are normal. No scleral icterus.     Extraocular Movements: Extraocular movements intact.      Conjunctiva/sclera: Conjunctivae normal.   Cardiovascular:      Rate and Rhythm: Normal rate and regular rhythm.      Heart sounds: No murmur heard.    No friction rub. No gallop.   Pulmonary:      Effort: Pulmonary effort is normal.      Breath sounds: Normal  breath sounds. No decreased breath sounds, wheezing, rhonchi or rales.   Neurological:      Mental Status: He is alert.      Cranial Nerves: No cranial nerve deficit.      Gait: Gait normal.   Psychiatric:         Mood and Affect: Mood and affect normal.         Assessment:       1. Vertigo    2. Seasonal allergies        Plan:     Problem List Items Addressed This Visit    None     Visit Diagnoses     Vertigo    -  Primary    Seasonal allergies        Relevant Medications    levocetirizine (XYZAL) 5 MG tablet

## 2022-03-11 ENCOUNTER — TELEPHONE (OUTPATIENT)
Dept: URGENT CARE | Facility: CLINIC | Age: 57
End: 2022-03-11
Payer: COMMERCIAL

## 2022-03-22 ENCOUNTER — OFFICE VISIT (OUTPATIENT)
Dept: INTERNAL MEDICINE | Facility: CLINIC | Age: 57
End: 2022-03-22
Payer: COMMERCIAL

## 2022-03-22 ENCOUNTER — OFFICE VISIT (OUTPATIENT)
Dept: OTOLARYNGOLOGY | Facility: CLINIC | Age: 57
End: 2022-03-22
Payer: COMMERCIAL

## 2022-03-22 ENCOUNTER — PATIENT MESSAGE (OUTPATIENT)
Dept: OTOLARYNGOLOGY | Facility: CLINIC | Age: 57
End: 2022-03-22

## 2022-03-22 ENCOUNTER — CLINICAL SUPPORT (OUTPATIENT)
Dept: AUDIOLOGY | Facility: CLINIC | Age: 57
End: 2022-03-22
Payer: COMMERCIAL

## 2022-03-22 ENCOUNTER — PATIENT OUTREACH (OUTPATIENT)
Dept: ADMINISTRATIVE | Facility: OTHER | Age: 57
End: 2022-03-22
Payer: COMMERCIAL

## 2022-03-22 ENCOUNTER — TELEPHONE (OUTPATIENT)
Dept: OTOLARYNGOLOGY | Facility: CLINIC | Age: 57
End: 2022-03-22
Payer: COMMERCIAL

## 2022-03-22 VITALS
DIASTOLIC BLOOD PRESSURE: 74 MMHG | TEMPERATURE: 98 F | WEIGHT: 182.88 LBS | SYSTOLIC BLOOD PRESSURE: 118 MMHG | OXYGEN SATURATION: 97 % | HEART RATE: 92 BPM | RESPIRATION RATE: 18 BRPM | BODY MASS INDEX: 30.43 KG/M2

## 2022-03-22 VITALS
WEIGHT: 183 LBS | BODY MASS INDEX: 30.45 KG/M2 | TEMPERATURE: 99 F | HEART RATE: 80 BPM | DIASTOLIC BLOOD PRESSURE: 76 MMHG | SYSTOLIC BLOOD PRESSURE: 112 MMHG

## 2022-03-22 DIAGNOSIS — R42 VERTIGO: ICD-10-CM

## 2022-03-22 DIAGNOSIS — I95.9 HYPOTENSION, UNSPECIFIED HYPOTENSION TYPE: Primary | ICD-10-CM

## 2022-03-22 DIAGNOSIS — H81.4 VERTIGO OF CENTRAL ORIGIN: ICD-10-CM

## 2022-03-22 DIAGNOSIS — H90.3 SENSORINEURAL HEARING LOSS (SNHL) OF BOTH EARS: Primary | ICD-10-CM

## 2022-03-22 DIAGNOSIS — R42 VERTIGO: Primary | ICD-10-CM

## 2022-03-22 PROCEDURE — 99999 PR PBB SHADOW E&M-EST. PATIENT-LVL III: ICD-10-PCS | Mod: PBBFAC,,, | Performed by: OTOLARYNGOLOGY

## 2022-03-22 PROCEDURE — 92557 COMPREHENSIVE HEARING TEST: CPT | Mod: S$GLB,,,

## 2022-03-22 PROCEDURE — 99213 PR OFFICE/OUTPT VISIT, EST, LEVL III, 20-29 MIN: ICD-10-PCS | Mod: S$GLB,,, | Performed by: PHYSICIAN ASSISTANT

## 2022-03-22 PROCEDURE — 92567 PR TYMPA2METRY: ICD-10-PCS | Mod: S$GLB,,,

## 2022-03-22 PROCEDURE — 99999 PR PBB SHADOW E&M-EST. PATIENT-LVL II: ICD-10-PCS | Mod: PBBFAC,,,

## 2022-03-22 PROCEDURE — 99213 PR OFFICE/OUTPT VISIT, EST, LEVL III, 20-29 MIN: ICD-10-PCS | Mod: S$GLB,,, | Performed by: OTOLARYNGOLOGY

## 2022-03-22 PROCEDURE — 99999 PR PBB SHADOW E&M-EST. PATIENT-LVL IV: CPT | Mod: PBBFAC,,, | Performed by: PHYSICIAN ASSISTANT

## 2022-03-22 PROCEDURE — 92557 PR COMPREHENSIVE HEARING TEST: ICD-10-PCS | Mod: S$GLB,,,

## 2022-03-22 PROCEDURE — 99999 PR PBB SHADOW E&M-EST. PATIENT-LVL IV: ICD-10-PCS | Mod: PBBFAC,,, | Performed by: PHYSICIAN ASSISTANT

## 2022-03-22 PROCEDURE — 99213 OFFICE O/P EST LOW 20 MIN: CPT | Mod: S$GLB,,, | Performed by: PHYSICIAN ASSISTANT

## 2022-03-22 PROCEDURE — 99999 PR PBB SHADOW E&M-EST. PATIENT-LVL III: CPT | Mod: PBBFAC,,, | Performed by: OTOLARYNGOLOGY

## 2022-03-22 PROCEDURE — 99213 OFFICE O/P EST LOW 20 MIN: CPT | Mod: S$GLB,,, | Performed by: OTOLARYNGOLOGY

## 2022-03-22 PROCEDURE — 99999 PR PBB SHADOW E&M-EST. PATIENT-LVL II: CPT | Mod: PBBFAC,,,

## 2022-03-22 PROCEDURE — 92567 TYMPANOMETRY: CPT | Mod: S$GLB,,,

## 2022-03-22 NOTE — PROGRESS NOTES
Health Maintenance Due   Topic Date Due    Hepatitis C Screening  Never done    HIV Screening  Never done    TETANUS VACCINE  Never done    Shingles Vaccine (1 of 2) Never done    Influenza Vaccine (1) 09/01/2021     Updates were requested from care everywhere.  Chart was reviewed for overdue Proactive [facility] Encounters ([facility]) topics (CRS, Breast Cancer Screening, Eye exam)  Health Maintenance has been updated.  LINKS immunization registry triggered.  Immunizations were reconciled.

## 2022-03-22 NOTE — TELEPHONE ENCOUNTER
----- Message from Holli Patterson sent at 3/22/2022  3:11 PM CDT -----  Pt is calling in regards to his MRI on 03/28/2022. Pt stated that he would like an open MRI. Pt stated that he is claustrophobic. Please call [phone].

## 2022-03-22 NOTE — PROGRESS NOTES
Subjective:       Patient ID: Vinny Stover is a 56 y.o. male.    Chief Complaint: Dizziness and Blurred Vision (Occurring longest 8 minutes)    Patient Care Team:  GAL Garcia MD as PCP - General (Family Medicine)  Markell Mosley MD as Consulting Physician (Gastroenterology)    Vinny Stover is a 56 y.o. male who presents today with complaints of Dizziness and Blurred Vision.  Patient reports single episode yesterday of dizziness with blurred vision that lasted about 8-1/2 minutes then resolved completely.  He denies any other symptoms such as difficulty with speech, hearing loss, headache, double vision, complete loss of vision, paresthesias, weakness or facial drooping.  Patient was seen almost 2 weeks ago for similar complaints and instructed to take over-the-counter meclizine that is dissolvable.  He reports this medication has helped but because he did not have it at work yesterday he was unable to use it with this episode.  He stated that he did take it tonight and has not had any episodes today.    Review of Systems   Constitutional: Negative for chills and fever.   Eyes: Positive for visual disturbance.   Gastrointestinal: Negative for nausea.   Musculoskeletal: Negative for gait problem and myalgias.   Neurological: Positive for dizziness. Negative for facial asymmetry, speech difficulty, weakness and headaches.       Objective:      Physical Exam  Vitals and nursing note reviewed.   Constitutional:       General: He is not in acute distress.     Appearance: He is well-developed.   HENT:      Head: Normocephalic and atraumatic.   Eyes:      General: Lids are normal. No scleral icterus.     Extraocular Movements: Extraocular movements intact.      Conjunctiva/sclera: Conjunctivae normal.   Pulmonary:      Effort: Pulmonary effort is normal.   Neurological:      Mental Status: He is alert.      Cranial Nerves: No cranial nerve deficit.      Gait: Gait normal.   Psychiatric:         Mood  and Affect: Mood and affect normal.         Assessment:       1. Vertigo        Plan:     Problem List Items Addressed This Visit    None     Visit Diagnoses     Vertigo    -  Primary    Relevant Orders    Ambulatory referral/consult to ENT        Danielito Burroughs every 6-8 hours, ref to ENT for evaluation    Patient given ED precautions such as speech difficulty, severe headache, facial drooping, extremity weakness.

## 2022-03-22 NOTE — PROGRESS NOTES
Referring Provider:    Eva Ortiz Pa-c  [id number] [facility] [place] [id number]  Subjective:   Patient: Vinny Stover [id number], :1965   Visit date:3/22/2022 1:43 PM    Chief Complaint:  Dizziness    HPI:    Prior notes reviewed by myself.  Clinical documentation obtained by nursing staff reviewed.     56-year-old gentleman presents with complaints of dizziness.  He describes intermittent episodes of dizziness that can last for minutes to hours.  He describes these episodes occasionally as being lightheadedness but also several times were consistent with spinning.  He has had some associated symptoms including headache, phonophobia, photophobia and vision changes.  He has not been able to identify any alleviating or exacerbating factors.  He does not have a known history of migraine headache.  He does have a history of hypotension.  No new medications.  No significant otologic history.        Objective:     Physical Exam:  Vitals:  /76 (BP Location: Left arm, Patient Position: Sitting)   Pulse 80   Temp 98.8 °F (37.1 °C) (Temporal)   Wt 83 kg (182 lb 15.7 oz)   BMI 30.45 kg/m²   General appearance:  Well developed, well nourished    Ears:  Otoscopy of external auditory canals and tympanic membranes was normal, clinical speech reception thresholds grossly intact, no mass/lesion of auricle.    Nose:  No masses/lesions of external nose, nasal mucosa, septum, and turbinates were within normal limits.    Mouth:  No mass/lesion of lips, teeth, gums, hard/soft palate, tongue, tonsils, or oropharynx.    Neck & Lymphatics:  No cervical lymphadenopathy, no neck mass/crepitus/ asymmetry, trachea is midline, no thyroid enlargement/tenderness/mass.    Neuro:  CN II-XII intact bilaterally, negative hallpike    [x]  Data Reviewed:    Lab Results   Component Value Date    WBC 7.29 2017    HGB 14.8 2017    HCT 42.1 2017    MCV 91 2017    EOSINOPHIL 7.3 2017          Reviewed recent notes from Eva Ortiz PA-C      Assessment & Plan:   Hypotension, unspecified hypotension type    Vertigo of central origin  -     MRI IAC/Temporal Bones W W/O Contrast; Future; Expected date: 03/22/2022        His neuro exam and Hallpike's were negative today.  He is having some unusual neurologic symptoms such as visual changes along with his dizziness.  I recommended imaging of his brain to rule out any intracranial pathology.  We discussed the option of a full vestibular workup including VNG.  He is going out of the country for several weeks, so we elected to hold off on this until his return.  He does have some symptoms that are consistent with vestibular migraine including possible aura, photophobia and phonophobia along with headache at the time of his dizziness.  We discussed medical treatment for this, but he would like to hold off until after his trip  He does have fairly low blood pressure readings on his last several internal medicine visits.  Today his blood pressure was 112/76.  I explained that he may be having some orthostatic hypotension and that he should start checking his blood pressure immediately each time he has 1 of these episodes.  He understands and agrees with plan

## 2022-03-22 NOTE — PROGRESS NOTES
Referring Provider: Eva Ortiz PA-C    Vinny Stover was seen 03/22/2022 for an audiological evaluation. Patient complains of a few instances of dizzy spells. He noted since most recent episode lasted approximately 8-9 minutes and he has associated blurred vision. He described dizziness as varying between a room-spinning sensation and lightheadedness. Patient noted headaches occurring either before or after each episode. Patient endorsed a history of noise exposure through  service with use of hearing protection. Patient denied tinnitus, otalgia, and history of ear related surgeries.     Otoscopy revealed clear canal with visualization of the tympanic membrane in both ears. Tympanograms were Type A for the right ear and Type A for the left ear. Audiometry revealed normal hearing sensitivity through 3000 Hz sloping to a moderate sensorineural hearing loss rising to normal hearing sensitivity at 4696-5629 Hz for the right ear, and normal hearing sensitivity through 2000 Hz sloping to a mild-to-moderate sensorineural hearing loss for the left ear. Speech Reception Thresholds were  20 dBHL for the right ear and 20 dBHL for the left ear. Word recognition scores were excellent for the right ear and excellent for the left ear.    Right [place]-Hallpike: Negative for BPPV  Left [place]-Hallpike: Negative for BPPV    Patient was counseled on the above findings.    Recommendations:  1. Consultation with ENT, as scheduled.  2. Repeat audiological evaluation in one to two years to monitor hearing, or sooner if needed.  3. Consider formal VNG testing for dizziness.

## 2022-03-23 DIAGNOSIS — H81.4 VERTIGO OF CENTRAL ORIGIN: ICD-10-CM

## 2022-03-29 ENCOUNTER — TELEPHONE (OUTPATIENT)
Dept: OTOLARYNGOLOGY | Facility: CLINIC | Age: 57
End: 2022-03-29
Payer: COMMERCIAL

## 2022-03-29 NOTE — TELEPHONE ENCOUNTER
Spoke with [facility] and they stated that pt's MRI scheduled for 3/30 at 7 am was not authorized. They will contact pt to cancel MRI for tomorrow and reschedule after 4/16 when he gets back in town. Sent message to prior authorization team inquiring about authorization for MRI and if there's anything that needs to be done by ENT dept.

## 2022-03-29 NOTE — TELEPHONE ENCOUNTER
----- Message from Kate Smart sent at 3/29/2022  3:48 PM CDT -----  Contact: imaging center  Daisy is calling to check on the authorization for MRI. Pt appointment is scheduled for tomorrow. Please call her back at [phone].            Thanks  DD

## 2022-03-30 ENCOUNTER — TELEPHONE (OUTPATIENT)
Dept: OTOLARYNGOLOGY | Facility: CLINIC | Age: 57
End: 2022-03-30
Payer: COMMERCIAL

## 2022-03-30 NOTE — TELEPHONE ENCOUNTER
Called and left message for pt instructing him to let us know the date he will get outside MRI done at [facility]. Once date of his scan is known, will enter date into scheduling section of external referral so that it can be authorized.

## 2022-04-20 ENCOUNTER — TELEPHONE (OUTPATIENT)
Dept: OTOLARYNGOLOGY | Facility: CLINIC | Age: 57
End: 2022-04-20
Payer: COMMERCIAL

## 2022-04-20 NOTE — TELEPHONE ENCOUNTER
Messaged the pre cert dept today regarding prior auth for pt's MRI scheduled at [facility] on 4/21/22 @11:15a.  Per Maribell Frias the auth was not submitted until today and will take another 2+ business days to obtain a response.  [facility] notified

## 2022-04-20 NOTE — TELEPHONE ENCOUNTER
----- Message from Donna Martini sent at 4/20/2022 12:48 PM CDT -----  Contact: [phone] @ Imaging center  Good Afternoon  Imaging office called need approval for MRI    Please call and advise

## 2022-06-17 ENCOUNTER — OFFICE VISIT (OUTPATIENT)
Dept: PRIMARY CARE CLINIC | Facility: CLINIC | Age: 57
End: 2022-06-17
Payer: COMMERCIAL

## 2022-06-17 VITALS
WEIGHT: 184.63 LBS | SYSTOLIC BLOOD PRESSURE: 120 MMHG | HEART RATE: 78 BPM | BODY MASS INDEX: 30.76 KG/M2 | HEIGHT: 65 IN | DIASTOLIC BLOOD PRESSURE: 76 MMHG | OXYGEN SATURATION: 99 % | TEMPERATURE: 97 F

## 2022-06-17 DIAGNOSIS — M54.50 ACUTE BILATERAL LOW BACK PAIN WITHOUT SCIATICA: Primary | ICD-10-CM

## 2022-06-17 DIAGNOSIS — Z12.5 SCREENING FOR MALIGNANT NEOPLASM OF PROSTATE: ICD-10-CM

## 2022-06-17 DIAGNOSIS — Z00.00 ENCOUNTER FOR ANNUAL HEALTH EXAMINATION: ICD-10-CM

## 2022-06-17 PROCEDURE — 99999 PR PBB SHADOW E&M-EST. PATIENT-LVL III: ICD-10-PCS | Mod: PBBFAC,,, | Performed by: NURSE PRACTITIONER

## 2022-06-17 PROCEDURE — 99213 PR OFFICE/OUTPT VISIT, EST, LEVL III, 20-29 MIN: ICD-10-PCS | Mod: S$GLB,,, | Performed by: NURSE PRACTITIONER

## 2022-06-17 PROCEDURE — 99999 PR PBB SHADOW E&M-EST. PATIENT-LVL III: CPT | Mod: PBBFAC,,, | Performed by: NURSE PRACTITIONER

## 2022-06-17 PROCEDURE — 99213 OFFICE O/P EST LOW 20 MIN: CPT | Mod: S$GLB,,, | Performed by: NURSE PRACTITIONER

## 2022-06-17 RX ORDER — MELOXICAM 15 MG/1
15 TABLET ORAL DAILY
Qty: 30 TABLET | Refills: 3 | Status: SHIPPED | OUTPATIENT
Start: 2022-06-17 | End: 2023-02-02

## 2022-06-17 RX ORDER — CYCLOBENZAPRINE HCL 10 MG
10 TABLET ORAL 3 TIMES DAILY PRN
Qty: 30 TABLET | Refills: 2 | Status: SHIPPED | OUTPATIENT
Start: 2022-06-17 | End: 2022-06-27

## 2022-06-17 NOTE — PROGRESS NOTES
Disclaimer:  This note is prepared using voice recognition software and as such is likely to have errors and has not been proof read. Please contact me for questions.    Subjective:      Patient ID: Vinny Stover is a 57 y.o. male.    Chief Complaint: Back Pain    Mr. Stover presents to clinic today with complaints of low back pain for a few days. Started the day after he painted his daughters condo. Over the last few years he's had similar flare ups that resolved with Mobic and Flexeril. He had a few doses left from a while back but took the last few doses that he had on hand. Reports that symptoms are gradually improving as time passes. Wanted to see about getting more meloxicam and cyclobenzaprine. Feels like he will be able to get resolution if he has a few more days of medication. He's had imaging of the back previously and done physical therapy. Imaging revealed some degenerative changes. Physical therapy was helpful but he has all the exercises and stretches that he used to do and feels like he can manage at home for now without a referral. No fever, GI/ symptoms, weakness/numbness of the lower extremities, saddle anesthesia, or other red flag symptoms.    Has appt for annual at the end of July. Wanting to have annual labs done through [facility] and requests an order. Might want to reschedule with one of us at [facility] location if he can get in sooner than his July appointment with Dr. Garcia.      Review of Systems   Constitutional: Negative.  Negative for chills and fever.   HENT: Negative.    Eyes: Negative.    Respiratory: Negative.  Negative for shortness of breath and wheezing.    Cardiovascular: Negative.    Gastrointestinal: Negative.  Negative for abdominal pain, constipation, diarrhea, nausea and vomiting.   Endocrine: Negative.    Genitourinary: Negative.    Musculoskeletal: Positive for back pain. Negative for gait problem, joint swelling, neck pain and neck stiffness.   Skin: Negative.   "Negative for rash.   Allergic/Immunologic: Negative.    Neurological: Negative.    Hematological: Negative.    Psychiatric/Behavioral: Negative.    All other systems reviewed and are negative.      Objective:   /76 (BP Location: Left arm, Patient Position: Sitting)   Pulse 78   Temp 97.4 °F (36.3 °C) (Temporal)   Ht 5' 5" (1.651 m)   Wt 83.7 kg (184 lb 10.2 oz)   SpO2 99%   BMI 30.72 kg/m²   Physical Exam  Vitals reviewed.   Constitutional:       Appearance: Normal appearance. He is well-developed.   HENT:      Head: Normocephalic and atraumatic.      Right Ear: External ear normal.      Left Ear: External ear normal.      Nose: Nose normal.      Mouth/Throat:      Mouth: Mucous membranes are moist.      Pharynx: Oropharynx is clear. Uvula midline. No posterior oropharyngeal erythema.   Eyes:      General: No scleral icterus.        Right eye: No discharge.         Left eye: No discharge.      Conjunctiva/sclera: Conjunctivae normal.   Cardiovascular:      Rate and Rhythm: Normal rate and regular rhythm.      Pulses: Normal pulses.      Heart sounds: Normal heart sounds.   Pulmonary:      Effort: Pulmonary effort is normal. No respiratory distress.      Breath sounds: Normal breath sounds. No wheezing.   Abdominal:      General: Bowel sounds are normal.      Palpations: Abdomen is soft.      Tenderness: There is no abdominal tenderness.   Musculoskeletal:      Cervical back: Normal, normal range of motion and neck supple.      Thoracic back: Normal.      Lumbar back: Tenderness (paraspinal musculature tense and moderately TTP, no midline TTP, no other abnormalities noted on exam) present. No swelling, deformity, signs of trauma, lacerations or bony tenderness. Normal range of motion. Negative right straight leg raise test and negative left straight leg raise test. No scoliosis.      Right lower leg: No edema.      Left lower leg: No edema.   Lymphadenopathy:      Cervical: No cervical adenopathy. "   Skin:     General: Skin is warm and dry.      Capillary Refill: Capillary refill takes less than 2 seconds.      Findings: No rash.   Neurological:      General: No focal deficit present.      Mental Status: He is alert and oriented to person, place, and time.      Sensory: No sensory deficit.      Motor: No weakness.      Coordination: Coordination normal.      Gait: Gait normal.      Deep Tendon Reflexes: Reflexes normal.   Psychiatric:         Mood and Affect: Mood normal.         Behavior: Behavior normal. Behavior is cooperative.         Thought Content: Thought content normal.         Judgment: Judgment normal.       Assessment:     1. Acute bilateral low back pain without sciatica    2. Encounter for annual health examination    3. Screening for malignant neoplasm of prostate       Plan:   Acute bilateral low back pain without sciatica  -     cyclobenzaprine (FLEXERIL) 10 MG tablet; Take 1 tablet (10 mg total) by mouth 3 (three) times daily as needed for Muscle spasms.  Dispense: 30 tablet; Refill: 2  -     meloxicam (MOBIC) 15 MG tablet; Take 1 tablet (15 mg total) by mouth once daily.  Dispense: 30 tablet; Refill: 3    Encounter for annual health examination  Comments:  diagnosis used for coding; has upcoming annual appointment and would like to have labs done through [facility] prior to appt; orders placed per pt request.  Orders:  -     CBC Auto Differential; Future; Expected date: 06/20/2022  -     Comprehensive Metabolic Panel; Future; Expected date: 06/20/2022  -     Hemoglobin A1C; Future; Expected date: 06/20/2022  -     Lipid Panel; Future; Expected date: 06/20/2022  -     PSA, Screening; Future; Expected date: 06/20/2022  -     TSH; Future; Expected date: 06/20/2022    Screening for malignant neoplasm of prostate  -     PSA, Screening; Future; Expected date: 06/20/2022    Declined toradol injection today. Feels like he just needs a little more time on meloxicam and cyclobenzaprine.   Declines  physical therapy referral for now. Has stretches and strengthening exercises from past visits to PT for his back.  Lab orders placed to be done prior to upcoming annual visit.     No follow-ups on file.    There are no Patient Instructions on file for this visit.

## 2022-06-19 NOTE — TELEPHONE ENCOUNTER
Group Topic: BH CBT    Date: 6/19/2022  Start Time: 1300  End Time: 1345  Facilitators: Wilfredo Lan LCSW    Focus:  symptom recognition and relapse.   Number in attendance: 7    Method: Group  Attendance: Present  Participation: Active  Patient Response: Appropriate feedback  Behavior/Socialization: Appropriate to group  Thought Process: Unremarkable  Task Performance: Follows directions  Patient Evaluation: Independent - full participation       LOV  4/5/2018, not due til July for follow up

## 2022-06-21 DIAGNOSIS — R97.20 ELEVATED PSA: Primary | ICD-10-CM

## 2022-06-21 LAB
ALBUMIN SERPL-MCNC: 4.3 G/DL (ref 3.8–4.9)
ALBUMIN/GLOB SERPL: 1.9 {RATIO} (ref 1.2–2.2)
ALP SERPL-CCNC: 76 IU/L (ref 44–121)
ALT SERPL-CCNC: 25 IU/L (ref 0–44)
AST SERPL-CCNC: 21 IU/L (ref 0–40)
BASOPHILS # BLD AUTO: 0.1 X10E3/UL (ref 0–0.2)
BASOPHILS NFR BLD AUTO: 1 %
BILIRUB SERPL-MCNC: 0.4 MG/DL (ref 0–1.2)
BUN SERPL-MCNC: 10 MG/DL (ref 6–24)
BUN/CREAT SERPL: 10 (ref 9–20)
CALCIUM SERPL-MCNC: 9.1 MG/DL (ref 8.7–10.2)
CHLORIDE SERPL-SCNC: 104 MMOL/L (ref 96–106)
CHOLEST SERPL-MCNC: 162 MG/DL (ref 100–199)
CO2 SERPL-SCNC: 24 MMOL/L (ref 20–29)
CREAT SERPL-MCNC: 0.99 MG/DL (ref 0.76–1.27)
EOSINOPHIL # BLD AUTO: 0.3 X10E3/UL (ref 0–0.4)
EOSINOPHIL NFR BLD AUTO: 4 %
ERYTHROCYTE [DISTWIDTH] IN BLOOD BY AUTOMATED COUNT: 12.7 % (ref 11.6–15.4)
EST. GFR  (NO RACE VARIABLE): 89 ML/MIN/1.73
GLOBULIN SER CALC-MCNC: 2.3 G/DL (ref 1.5–4.5)
GLUCOSE SERPL-MCNC: 105 MG/DL (ref 65–99)
HBA1C MFR BLD: 5.6 % (ref 4.8–5.6)
HCT VFR BLD AUTO: 42.7 % (ref 37.5–51)
HDLC SERPL-MCNC: 42 MG/DL
HGB BLD-MCNC: 14.4 G/DL (ref 13–17.7)
IMM GRANULOCYTES # BLD AUTO: 0 X10E3/UL (ref 0–0.1)
IMM GRANULOCYTES NFR BLD AUTO: 0 %
LDLC SERPL CALC-MCNC: 104 MG/DL (ref 0–99)
LYMPHOCYTES # BLD AUTO: 2.5 X10E3/UL (ref 0.7–3.1)
LYMPHOCYTES NFR BLD AUTO: 35 %
MCH RBC QN AUTO: 31.4 PG (ref 26.6–33)
MCHC RBC AUTO-ENTMCNC: 33.7 G/DL (ref 31.5–35.7)
MCV RBC AUTO: 93 FL (ref 79–97)
MONOCYTES # BLD AUTO: 0.7 X10E3/UL (ref 0.1–0.9)
MONOCYTES NFR BLD AUTO: 10 %
NEUTROPHILS # BLD AUTO: 3.5 X10E3/UL (ref 1.4–7)
NEUTROPHILS NFR BLD AUTO: 50 %
PLATELET # BLD AUTO: 235 X10E3/UL (ref 150–450)
POTASSIUM SERPL-SCNC: 4.7 MMOL/L (ref 3.5–5.2)
PROT SERPL-MCNC: 6.6 G/DL (ref 6–8.5)
PSA SERPL-MCNC: 7.7 NG/ML (ref 0–4)
RBC # BLD AUTO: 4.58 X10E6/UL (ref 4.14–5.8)
SODIUM SERPL-SCNC: 141 MMOL/L (ref 134–144)
TRIGL SERPL-MCNC: 85 MG/DL (ref 0–149)
TSH SERPL DL<=0.005 MIU/L-ACNC: 3.94 UIU/ML (ref 0.45–4.5)
VLDLC SERPL CALC-MCNC: 16 MG/DL (ref 5–40)
WBC # BLD AUTO: 7 X10E3/UL (ref 3.4–10.8)

## 2022-07-05 ENCOUNTER — OFFICE VISIT (OUTPATIENT)
Dept: UROLOGY | Facility: CLINIC | Age: 57
End: 2022-07-05
Payer: COMMERCIAL

## 2022-07-05 ENCOUNTER — LAB VISIT (OUTPATIENT)
Dept: LAB | Facility: HOSPITAL | Age: 57
End: 2022-07-05
Attending: UROLOGY
Payer: COMMERCIAL

## 2022-07-05 VITALS
BODY MASS INDEX: 30.78 KG/M2 | DIASTOLIC BLOOD PRESSURE: 86 MMHG | SYSTOLIC BLOOD PRESSURE: 122 MMHG | WEIGHT: 184.94 LBS

## 2022-07-05 DIAGNOSIS — R97.20 ELEVATED PSA: ICD-10-CM

## 2022-07-05 DIAGNOSIS — R97.20 ELEVATED PSA: Primary | ICD-10-CM

## 2022-07-05 PROCEDURE — 99204 PR OFFICE/OUTPT VISIT, NEW, LEVL IV, 45-59 MIN: ICD-10-PCS | Mod: S$GLB,,, | Performed by: UROLOGY

## 2022-07-05 PROCEDURE — 84520 ASSAY OF UREA NITROGEN: CPT | Performed by: UROLOGY

## 2022-07-05 PROCEDURE — 36415 COLL VENOUS BLD VENIPUNCTURE: CPT | Mod: PN | Performed by: UROLOGY

## 2022-07-05 PROCEDURE — 51798 PR MEAS,POST-VOID RES,US,NON-IMAGING: ICD-10-PCS | Mod: S$GLB,,, | Performed by: UROLOGY

## 2022-07-05 PROCEDURE — 51798 US URINE CAPACITY MEASURE: CPT | Mod: S$GLB,,, | Performed by: UROLOGY

## 2022-07-05 PROCEDURE — 99999 PR PBB SHADOW E&M-EST. PATIENT-LVL III: CPT | Mod: PBBFAC,,, | Performed by: UROLOGY

## 2022-07-05 PROCEDURE — 84153 ASSAY OF PSA TOTAL: CPT | Performed by: UROLOGY

## 2022-07-05 PROCEDURE — 99999 PR PBB SHADOW E&M-EST. PATIENT-LVL III: ICD-10-PCS | Mod: PBBFAC,,, | Performed by: UROLOGY

## 2022-07-05 PROCEDURE — 82565 ASSAY OF CREATININE: CPT | Performed by: UROLOGY

## 2022-07-05 PROCEDURE — 99204 OFFICE O/P NEW MOD 45 MIN: CPT | Mod: S$GLB,,, | Performed by: UROLOGY

## 2022-07-05 RX ORDER — DIAZEPAM 5 MG/1
TABLET ORAL
Qty: 2 TABLET | Refills: 0 | Status: SHIPPED | OUTPATIENT
Start: 2022-07-05 | End: 2022-08-05

## 2022-07-05 NOTE — PROGRESS NOTES
Chief Complaint   Patient presents with    Other     Elevated psa, Frequency       History of Present Illness:   Vinny Stover is a 57 y.o. male here for evaluation of Other (Elevated psa, Frequency)    7/5/22- 58yo male here for evaluation of elevated PSA of 7.7. Pt denies any h/o elevated PSA. Did have prostatitis 5-6 years ago and was given antibiotics at that time. Reports having urinary frequency. Does drink a lot of coffee. Nocturia x 2. No gross hematuria or dysuria. Stream is strong.   No family h/o CaP.       Review of Systems   Constitutional: Negative for chills and fever.   Respiratory: Negative for shortness of breath.    Cardiovascular: Negative for chest pain.   Gastrointestinal: Negative for abdominal pain.   Genitourinary: Positive for frequency. Negative for difficulty urinating.   Musculoskeletal: Positive for back pain.   All other systems reviewed and are negative.      Past Medical History:   Diagnosis Date    Aphthous stomatitis 12/26/2019    Arthritis     Colon polyp     Gastroesophageal reflux disease without esophagitis 02/28/2018    Obstructive sleep apnea syndrome 12/26/2019       Past Surgical History:   Procedure Laterality Date    COLONOSCOPY  2015    NOSE SURGERY         Family History   Problem Relation Age of Onset    Drug abuse Mother     No Known Problems Father     Colon cancer Paternal Grandfather        Social History     Tobacco Use    Smoking status: Never Smoker    Smokeless tobacco: Never Used   Substance Use Topics    Alcohol use: No    Drug use: No       Current Outpatient Medications   Medication Sig Dispense Refill    meloxicam (MOBIC) 15 MG tablet Take 1 tablet (15 mg total) by mouth once daily. 30 tablet 3    diazePAM (VALIUM) 5 MG tablet Take 1 po 45 minutes before visit. 2 tablet 0     No current facility-administered medications for this visit.       Review of patient's allergies indicates:  No Known Allergies    Physical Exam  Vitals:     07/05/22 1510   BP: 122/86       General: Well-developed, well-nourished in no acute distress  HEENT: Normocephalic, atraumatic, Extraocular movements intact  Neck: supple, trachea midline, no cervical or supraclavicular lymphadenopathy  Respirations: even and unlabored  Back: midline spine, no CVA tenderness  Abdomen: soft, Non-tender, non-distended, no organomegaly or palpable masses, no rebound or guarding  Rectal: 7/5/22-30g prostate, no nodules or tenderness. No gross blood  Extremities: atraumatic, moves all equally, no clubbing, cyanosis or edema  Psych: normal affect  Skin: warm and dry, no lesions  Neuro: Alert and oriented, Cranial nerves II-XII grossly intact    Urinalysis  Negative for blood, LE, nit    PVR: 69cc 7/5/22    Lab Results   Component Value Date    PSA 1.9 05/22/2017       Assessment:   1. Elevated PSA  Ambulatory referral/consult to Urology    Prostate Specific Antigen, Diagnostic    Creatinine, Serum    BUN    MRI Prostate W W/O Contrast       Plan:  Elevated PSA  -     Ambulatory referral/consult to Urology  -     Prostate Specific Antigen, Diagnostic; Future; Expected date: 07/05/2022  -     Creatinine, Serum; Future; Expected date: 07/05/2022  -     BUN; Future; Expected date: 07/05/2022  -     MRI Prostate W W/O Contrast; Future; Expected date: 07/05/2022    Other orders  -     diazePAM (VALIUM) 5 MG tablet; Take 1 po 45 minutes before visit.  Dispense: 2 tablet; Refill: 0        Follow up for MRI results.

## 2022-07-06 ENCOUNTER — PATIENT MESSAGE (OUTPATIENT)
Dept: UROLOGY | Facility: CLINIC | Age: 57
End: 2022-07-06
Payer: COMMERCIAL

## 2022-07-06 LAB — BUN SERPL-MCNC: 11 MG/DL (ref 6–20)

## 2022-07-07 ENCOUNTER — TELEPHONE (OUTPATIENT)
Dept: UROLOGY | Facility: CLINIC | Age: 57
End: 2022-07-07
Payer: COMMERCIAL

## 2022-07-07 ENCOUNTER — PATIENT MESSAGE (OUTPATIENT)
Dept: UROLOGY | Facility: CLINIC | Age: 57
End: 2022-07-07
Payer: COMMERCIAL

## 2022-07-07 LAB — COMPLEXED PSA SERPL-MCNC: 5.3 NG/ML (ref 0–4)

## 2022-07-07 NOTE — TELEPHONE ENCOUNTER
Spoke with patient. Explained that once he has MRI done he will follow up with Dr. Brasher to discuss next steps. She will review PSA and MRI with him on f/up visit and schedule any further testing at that time.   Frandy COBB

## 2022-07-27 ENCOUNTER — TELEPHONE (OUTPATIENT)
Dept: UROLOGY | Facility: CLINIC | Age: 57
End: 2022-07-27

## 2022-07-27 NOTE — TELEPHONE ENCOUNTER
----- Message from Анна Bright RT sent at 7/27/2022 11:46 AM CDT -----  Regarding: Creatinine  Reaching out to get the results for the Creatinine that was done on 07/05/2022.  He has a MRI scheduled at 7:30 am on 08/04 and we need them prior to the exam.    Thanks

## 2022-07-29 ENCOUNTER — PATIENT MESSAGE (OUTPATIENT)
Dept: UROLOGY | Facility: CLINIC | Age: 57
End: 2022-07-29
Payer: COMMERCIAL

## 2022-07-29 DIAGNOSIS — R97.20 ELEVATED PSA: Primary | ICD-10-CM

## 2022-07-29 NOTE — TELEPHONE ENCOUNTER
Called and spoke with pt, apologized to pt that his creatinine was sent to the lab to be tested. Rescheduled the pt for another creatinine on 8/1/22 at the [facility]. Pt verbalized understanding.

## 2022-08-01 ENCOUNTER — LAB VISIT (OUTPATIENT)
Dept: LAB | Facility: HOSPITAL | Age: 57
End: 2022-08-01
Attending: UROLOGY
Payer: COMMERCIAL

## 2022-08-01 DIAGNOSIS — R97.20 ELEVATED PSA: ICD-10-CM

## 2022-08-01 LAB
CREAT SERPL-MCNC: 1 MG/DL (ref 0.5–1.4)
EST. GFR  (NO RACE VARIABLE): >60 ML/MIN/1.73 M^2

## 2022-08-01 PROCEDURE — 36415 COLL VENOUS BLD VENIPUNCTURE: CPT | Performed by: UROLOGY

## 2022-08-01 PROCEDURE — 82565 ASSAY OF CREATININE: CPT | Performed by: UROLOGY

## 2022-08-04 ENCOUNTER — HOSPITAL ENCOUNTER (OUTPATIENT)
Dept: RADIOLOGY | Facility: HOSPITAL | Age: 57
Discharge: HOME | End: 2022-08-04
Attending: UROLOGY
Payer: COMMERCIAL

## 2022-08-04 DIAGNOSIS — R97.20 ELEVATED PSA: ICD-10-CM

## 2022-08-04 PROCEDURE — 72197 MRI PELVIS W/O & W/DYE: CPT | Mod: 26,,, | Performed by: RADIOLOGY

## 2022-08-04 PROCEDURE — 72197 MRI PROSTATE W W/O CONTRAST: ICD-10-PCS | Mod: 26,,, | Performed by: RADIOLOGY

## 2022-08-04 PROCEDURE — 25500020 PHARM REV CODE 255: Performed by: UROLOGY

## 2022-08-04 PROCEDURE — 72197 MRI PELVIS W/O & W/DYE: CPT | Mod: TC

## 2022-08-04 PROCEDURE — A9585 GADOBUTROL INJECTION: HCPCS | Performed by: UROLOGY

## 2022-08-04 RX ORDER — GADOBUTROL 604.72 MG/ML
8.5 INJECTION INTRAVENOUS
Status: COMPLETED | OUTPATIENT
Start: 2022-08-04 | End: 2022-08-04

## 2022-08-04 RX ADMIN — GADOBUTROL 8.5 ML: 604.72 INJECTION INTRAVENOUS at 08:08

## 2022-08-05 ENCOUNTER — OFFICE VISIT (OUTPATIENT)
Dept: UROLOGY | Facility: CLINIC | Age: 57
End: 2022-08-05
Payer: COMMERCIAL

## 2022-08-05 ENCOUNTER — LAB VISIT (OUTPATIENT)
Dept: LAB | Facility: HOSPITAL | Age: 57
End: 2022-08-05
Attending: UROLOGY
Payer: COMMERCIAL

## 2022-08-05 VITALS
SYSTOLIC BLOOD PRESSURE: 140 MMHG | WEIGHT: 186.94 LBS | HEIGHT: 65 IN | BODY MASS INDEX: 31.14 KG/M2 | DIASTOLIC BLOOD PRESSURE: 78 MMHG

## 2022-08-05 DIAGNOSIS — R97.20 ELEVATED PSA: ICD-10-CM

## 2022-08-05 DIAGNOSIS — R97.20 ELEVATED PSA: Primary | ICD-10-CM

## 2022-08-05 LAB — COMPLEXED PSA SERPL-MCNC: 4.1 NG/ML (ref 0–4)

## 2022-08-05 PROCEDURE — 99213 PR OFFICE/OUTPT VISIT, EST, LEVL III, 20-29 MIN: ICD-10-PCS | Mod: S$GLB,,, | Performed by: UROLOGY

## 2022-08-05 PROCEDURE — 99999 PR PBB SHADOW E&M-EST. PATIENT-LVL III: CPT | Mod: PBBFAC,,, | Performed by: UROLOGY

## 2022-08-05 PROCEDURE — 36415 COLL VENOUS BLD VENIPUNCTURE: CPT | Mod: PN | Performed by: UROLOGY

## 2022-08-05 PROCEDURE — 84153 ASSAY OF PSA TOTAL: CPT | Performed by: UROLOGY

## 2022-08-05 PROCEDURE — 99999 PR PBB SHADOW E&M-EST. PATIENT-LVL III: ICD-10-PCS | Mod: PBBFAC,,, | Performed by: UROLOGY

## 2022-08-05 PROCEDURE — 99213 OFFICE O/P EST LOW 20 MIN: CPT | Mod: S$GLB,,, | Performed by: UROLOGY

## 2022-08-05 NOTE — PROGRESS NOTES
Chief Complaint   Patient presents with    Other     MRI Results       History of Present Illness:   Vinny Stover is a 57 y.o. male here for evaluation of Other (MRI Results)    8/5/22- MRI read as PI RADS 2. Prostate measures at 61cc. No stranguria, gross hematuria. He does have frequency. PSA did decrease after last visit to 5.3.   7/5/22- 58yo male here for evaluation of elevated PSA of 7.7. Pt denies any h/o elevated PSA. Did have prostatitis 5-6 years ago and was given antibiotics at that time. Reports having urinary frequency. Does drink a lot of coffee. Nocturia x 2. No gross hematuria or dysuria. Stream is strong.   No family h/o CaP.       Review of Systems   Constitutional: Negative for chills and fever.   Respiratory: Negative for shortness of breath.    Cardiovascular: Negative for chest pain.   Gastrointestinal: Negative for abdominal pain.   Genitourinary: Positive for frequency. Negative for difficulty urinating.   Musculoskeletal: Positive for back pain.   All other systems reviewed and are negative.      Past Medical History:   Diagnosis Date    Aphthous stomatitis 12/26/2019    Arthritis     Colon polyp     Gastroesophageal reflux disease without esophagitis 02/28/2018    Obstructive sleep apnea syndrome 12/26/2019       Past Surgical History:   Procedure Laterality Date    COLONOSCOPY  2015    NOSE SURGERY         Family History   Problem Relation Age of Onset    Drug abuse Mother     No Known Problems Father     Colon cancer Paternal Grandfather        Social History     Tobacco Use    Smoking status: Never Smoker    Smokeless tobacco: Never Used   Substance Use Topics    Alcohol use: No    Drug use: No       Current Outpatient Medications   Medication Sig Dispense Refill    meloxicam (MOBIC) 15 MG tablet Take 1 tablet (15 mg total) by mouth once daily. 30 tablet 3     No current facility-administered medications for this visit.       Review of patient's allergies  indicates:  No Known Allergies    Physical Exam  Vitals:    08/05/22 1107   BP: (!) 140/78       General: Well-developed, well-nourished in no acute distress  HEENT: Normocephalic, atraumatic, Extraocular movements intact  Neck: supple, trachea midline, no cervical or supraclavicular lymphadenopathy  Respirations: even and unlabored  Back: midline spine, no CVA tenderness  Abdomen: soft, Non-tender, non-distended, no organomegaly or palpable masses, no rebound or guarding  Rectal: 7/5/22-30g prostate, no nodules or tenderness. No gross blood  Extremities: atraumatic, moves all equally, no clubbing, cyanosis or edema  Psych: normal affect  Skin: warm and dry, no lesions  Neuro: Alert and oriented, Cranial nerves II-XII grossly intact    Urinalysis  Negative for blood, LE, nit    PVR: 69cc 7/5/22    Assessment:   1. Elevated PSA  Prostate Specific Antigen, Diagnostic       Plan:  Elevated PSA  -     Prostate Specific Antigen, Diagnostic; Future; Expected date: 08/05/2022    MRI is read as normal and PSA declining. Discussed sensitivity of MRI.   If PSA continues to decline, will continue to monitor PSA. If stable or rising, will schedule TRUS biopsy  Will call

## 2022-08-06 DIAGNOSIS — R97.20 ELEVATED PSA: Primary | ICD-10-CM

## 2022-08-08 NOTE — PROGRESS NOTES
Called and spoke with pt, pt scheduled for 3 month f/u with psa before his visit at the [facility].

## 2022-09-13 ENCOUNTER — OFFICE VISIT (OUTPATIENT)
Dept: INTERNAL MEDICINE | Facility: CLINIC | Age: 57
End: 2022-09-13
Payer: COMMERCIAL

## 2022-09-13 ENCOUNTER — HOSPITAL ENCOUNTER (OUTPATIENT)
Dept: RADIOLOGY | Facility: HOSPITAL | Age: 57
Discharge: HOME | End: 2022-09-13
Attending: PHYSICIAN ASSISTANT
Payer: COMMERCIAL

## 2022-09-13 VITALS
RESPIRATION RATE: 15 BRPM | HEART RATE: 67 BPM | BODY MASS INDEX: 30.85 KG/M2 | TEMPERATURE: 98 F | OXYGEN SATURATION: 99 % | SYSTOLIC BLOOD PRESSURE: 126 MMHG | WEIGHT: 185.19 LBS | HEIGHT: 65 IN | DIASTOLIC BLOOD PRESSURE: 74 MMHG

## 2022-09-13 DIAGNOSIS — M25.541 PAIN INVOLVING JOINT OF FINGER OF RIGHT HAND: ICD-10-CM

## 2022-09-13 DIAGNOSIS — S62.629D CLOSED AVULSION FRACTURE OF MIDDLE PHALANX OF FINGER WITH ROUTINE HEALING, SUBSEQUENT ENCOUNTER: Primary | ICD-10-CM

## 2022-09-13 PROCEDURE — 73140 XR FINGER 2 OR MORE VIEWS RIGHT: ICD-10-PCS | Mod: 26,RT,, | Performed by: RADIOLOGY

## 2022-09-13 PROCEDURE — 99213 OFFICE O/P EST LOW 20 MIN: CPT | Mod: S$GLB,,, | Performed by: PHYSICIAN ASSISTANT

## 2022-09-13 PROCEDURE — 73140 X-RAY EXAM OF FINGER(S): CPT | Mod: 26,RT,, | Performed by: RADIOLOGY

## 2022-09-13 PROCEDURE — 99213 PR OFFICE/OUTPT VISIT, EST, LEVL III, 20-29 MIN: ICD-10-PCS | Mod: S$GLB,,, | Performed by: PHYSICIAN ASSISTANT

## 2022-09-13 PROCEDURE — 99999 PR PBB SHADOW E&M-EST. PATIENT-LVL IV: CPT | Mod: PBBFAC,,, | Performed by: PHYSICIAN ASSISTANT

## 2022-09-13 PROCEDURE — 99999 PR PBB SHADOW E&M-EST. PATIENT-LVL IV: ICD-10-PCS | Mod: PBBFAC,,, | Performed by: PHYSICIAN ASSISTANT

## 2022-09-13 PROCEDURE — 73140 X-RAY EXAM OF FINGER(S): CPT | Mod: TC,RT

## 2022-09-13 NOTE — PROGRESS NOTES
Subjective:       Patient ID: Vinny Stover is a 57 y.o. male.    Chief Complaint: Hand Pain (Patient injured his ring finger)      Patient Care Team:  GAL Garcia MD as PCP - General (Family Medicine)  Markell Mosley MD as Consulting Physician (Gastroenterology)    HPI    Vinny Stover is a 57 y.o. male who presents today with complaints of Hand Pain (Patient injured his ring finger)  Patient presents with complaints of pain and swelling to right 4th finger.  Started 2 weeks ago.  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  Pain started immediately after.  Pain is gradually improving.  He denies loss of range of motion.    Review of Systems   Musculoskeletal:  Positive for arthralgias and joint swelling.   Skin:  Negative for color change and wound.   Neurological:  Negative for numbness.     Objective:      Physical Exam  Vitals and nursing note reviewed.   Constitutional:       General: He is not in acute distress.     Appearance: He is well-developed.   HENT:      Head: Normocephalic and atraumatic.   Eyes:      General: Lids are normal. No scleral icterus.     Extraocular Movements: Extraocular movements intact.      Conjunctiva/sclera: Conjunctivae normal.   Cardiovascular:      Pulses:           Radial pulses are 2+ on the right side.   Pulmonary:      Effort: Pulmonary effort is normal.   Musculoskeletal:        Hands:    Neurological:      Mental Status: He is alert.      Cranial Nerves: No cranial nerve deficit.   Psychiatric:         Mood and Affect: Mood and affect normal.       Assessment:       1. Closed avulsion fracture of middle phalanx of finger with routine healing, subsequent encounter    2. Pain involving joint of finger of right hand        Plan:   1. Closed avulsion fracture of middle phalanx of finger with routine healing, subsequent encounter    2. Pain involving joint of finger of right hand  -     X-Ray Finger 2 or  More Views Right    Called patient with results and encouraged him to buddy tape his right 4th and 5th fingers together and stretches shown in clinic to do 4 times a day.  Elevate, ice compresses and ibuprofen as needed.  Treatment needed for an additional 2-3 weeks.    X-Ray Finger 2 or More Views Right  Narrative: EXAMINATION:  XR FINGER 2 OR MORE VIEWS RIGHT    CLINICAL HISTORY:  finger pain;  Pain in joints of right hand    TECHNIQUE:  Three views of the right ring finger are provided.    COMPARISON:  None    FINDINGS:  There is mild soft tissue swelling over the PIP joint.  A small avulsion fracture is noted of the base of the middle phalanx at the PIP joint without displacement.  Other fractures are not seen.  Impression: Avulsion fracture of the base of the middle phalanx of the right ring finger at the PIP joint.  Mild soft tissue swelling.    Electronically signed by: Mahamed Martinez MD  Date:    09/13/2022  Time:    16:19

## 2022-11-03 ENCOUNTER — LAB VISIT (OUTPATIENT)
Dept: LAB | Facility: HOSPITAL | Age: 57
End: 2022-11-03
Attending: UROLOGY
Payer: COMMERCIAL

## 2022-11-03 DIAGNOSIS — R97.20 ELEVATED PSA: ICD-10-CM

## 2022-11-03 PROCEDURE — 84153 ASSAY OF PSA TOTAL: CPT | Performed by: UROLOGY

## 2022-11-03 PROCEDURE — 36415 COLL VENOUS BLD VENIPUNCTURE: CPT | Performed by: UROLOGY

## 2022-11-04 LAB — COMPLEXED PSA SERPL-MCNC: 5.1 NG/ML (ref 0–4)

## 2022-11-07 ENCOUNTER — OFFICE VISIT (OUTPATIENT)
Dept: UROLOGY | Facility: CLINIC | Age: 57
End: 2022-11-07
Payer: COMMERCIAL

## 2022-11-07 VITALS
DIASTOLIC BLOOD PRESSURE: 78 MMHG | SYSTOLIC BLOOD PRESSURE: 136 MMHG | WEIGHT: 188.06 LBS | BODY MASS INDEX: 31.29 KG/M2 | HEART RATE: 69 BPM

## 2022-11-07 DIAGNOSIS — N40.1 BPH WITH OBSTRUCTION/LOWER URINARY TRACT SYMPTOMS: ICD-10-CM

## 2022-11-07 DIAGNOSIS — N13.8 BPH WITH OBSTRUCTION/LOWER URINARY TRACT SYMPTOMS: ICD-10-CM

## 2022-11-07 DIAGNOSIS — R35.0 URINARY FREQUENCY: Primary | ICD-10-CM

## 2022-11-07 DIAGNOSIS — R97.20 ELEVATED PSA: ICD-10-CM

## 2022-11-07 PROCEDURE — 99214 PR OFFICE/OUTPT VISIT, EST, LEVL IV, 30-39 MIN: ICD-10-PCS | Mod: S$GLB,,, | Performed by: UROLOGY

## 2022-11-07 PROCEDURE — 99999 PR PBB SHADOW E&M-EST. PATIENT-LVL III: ICD-10-PCS | Mod: PBBFAC,,, | Performed by: UROLOGY

## 2022-11-07 PROCEDURE — 99214 OFFICE O/P EST MOD 30 MIN: CPT | Mod: S$GLB,,, | Performed by: UROLOGY

## 2022-11-07 PROCEDURE — 99999 PR PBB SHADOW E&M-EST. PATIENT-LVL III: CPT | Mod: PBBFAC,,, | Performed by: UROLOGY

## 2022-11-07 RX ORDER — DIAZEPAM 5 MG/1
TABLET ORAL
Qty: 2 TABLET | Refills: 0 | Status: SHIPPED | OUTPATIENT
Start: 2022-11-07 | End: 2022-12-13

## 2022-11-07 RX ORDER — LEVOFLOXACIN 500 MG/1
TABLET, FILM COATED ORAL
Qty: 1 TABLET | Refills: 0 | Status: SHIPPED | OUTPATIENT
Start: 2022-11-07 | End: 2022-12-13

## 2022-11-07 NOTE — PROGRESS NOTES
Chief Complaint   Patient presents with    Other     3 months f/u          History of Present Illness:   Vinny Stover is a 57 y.o. male here for evaluation of Other (3 months f/u )    11/7/22- PSA up a little. No stranguria or dysuria. He does have frequency. Nocturia x 3. Drinks a lot of coffee.   8/5/22- MRI read as PI RADS 2. Prostate measures at 61cc. No stranguria, gross hematuria. He does have frequency. PSA did decrease after last visit to 5.3.   7/5/22- 56yo male here for evaluation of elevated PSA of 7.7. Pt denies any h/o elevated PSA. Did have prostatitis 5-6 years ago and was given antibiotics at that time. Reports having urinary frequency. Does drink a lot of coffee. Nocturia x 2. No gross hematuria or dysuria. Stream is strong.   No family h/o CaP.       Review of Systems   Constitutional:  Negative for chills and fever.   Respiratory:  Negative for shortness of breath.    Cardiovascular:  Negative for chest pain.   Gastrointestinal:  Negative for abdominal pain.   Genitourinary:  Positive for frequency. Negative for difficulty urinating.   Musculoskeletal:  Positive for back pain.   All other systems reviewed and are negative.    Past Medical History:   Diagnosis Date    Aphthous stomatitis 12/26/2019    Arthritis     Colon polyp     Gastroesophageal reflux disease without esophagitis 02/28/2018    Obstructive sleep apnea syndrome 12/26/2019       Past Surgical History:   Procedure Laterality Date    COLONOSCOPY  2015    NOSE SURGERY         Family History   Problem Relation Age of Onset    Drug abuse Mother     No Known Problems Father     Colon cancer Paternal Grandfather        Social History     Tobacco Use    Smoking status: Never    Smokeless tobacco: Never   Substance Use Topics    Alcohol use: No    Drug use: No       Current Outpatient Medications   Medication Sig Dispense Refill    meloxicam (MOBIC) 15 MG tablet Take 1 tablet (15 mg total) by mouth once daily. 30 tablet 3    diazePAM  (VALIUM) 5 MG tablet Take 1 po 45 minutes before visit. 2 tablet 0    levoFLOXacin (LEVAQUIN) 500 MG tablet Take 1 po 1 hour before biopsy 1 tablet 0     No current facility-administered medications for this visit.       Review of patient's allergies indicates:  No Known Allergies    Physical Exam  Vitals:    11/07/22 0819   BP: 136/78   Pulse: 69         General: Well-developed, well-nourished in no acute distress  HEENT: Normocephalic, atraumatic, Extraocular movements intact  Neck: supple, trachea midline, no cervical or supraclavicular lymphadenopathy  Respirations: even and unlabored  Back: midline spine, no CVA tenderness  Rectal: 7/5/22-30g prostate, no nodules or tenderness. No gross blood  Extremities: atraumatic, moves all equally, no clubbing, cyanosis or edema  Psych: normal affect  Skin: warm and dry, no lesions  Neuro: Alert and oriented, Cranial nerves II-XII grossly intact    Urinalysis  Trace LE, trace protein, trace blood    PVR: 69cc 7/5/22    PSA  3/26/19: 3.0  6/20/22: 7.7  7/8/22: 5.3  8/5/22: 4.1  11/3/22: 5.1    Assessment:   1. Urinary frequency  Urine culture      2. Elevated PSA        3. BPH with obstruction/lower urinary tract symptoms              Plan:  Urinary frequency  -     Urine culture    Elevated PSA    BPH with obstruction/lower urinary tract symptoms    Other orders  -     levoFLOXacin (LEVAQUIN) 500 MG tablet; Take 1 po 1 hour before biopsy  Dispense: 1 tablet; Refill: 0  -     diazePAM (VALIUM) 5 MG tablet; Take 1 po 45 minutes before visit.  Dispense: 2 tablet; Refill: 0    Discussed risks/benefits of TRUS biopsy. Pt elects to proceed.     Follow up for TRUS biopsy.

## 2022-11-28 ENCOUNTER — PROCEDURE VISIT (OUTPATIENT)
Dept: UROLOGY | Facility: CLINIC | Age: 57
End: 2022-11-28
Payer: COMMERCIAL

## 2022-11-28 VITALS
HEART RATE: 71 BPM | HEIGHT: 65 IN | SYSTOLIC BLOOD PRESSURE: 138 MMHG | DIASTOLIC BLOOD PRESSURE: 85 MMHG | WEIGHT: 188 LBS | BODY MASS INDEX: 31.32 KG/M2

## 2022-11-28 DIAGNOSIS — R97.20 ELEVATED PSA: Primary | ICD-10-CM

## 2022-11-28 PROCEDURE — 88305 TISSUE EXAM BY PATHOLOGIST: CPT | Mod: 26,,, | Performed by: PATHOLOGY

## 2022-11-28 PROCEDURE — 55700 PR BIOPSY OF PROSTATE,NEEDLE/PUNCH: ICD-10-PCS | Mod: S$GLB,,, | Performed by: UROLOGY

## 2022-11-28 PROCEDURE — 88305 TISSUE EXAM BY PATHOLOGIST: ICD-10-PCS | Mod: 26,,, | Performed by: PATHOLOGY

## 2022-11-28 PROCEDURE — 76872 US TRANSRECTAL: CPT | Mod: 26,S$GLB,, | Performed by: UROLOGY

## 2022-11-28 PROCEDURE — 96372 PR INJECTION,THERAP/PROPH/DIAG2ST, IM OR SUBCUT: ICD-10-PCS | Mod: 59,S$GLB,, | Performed by: UROLOGY

## 2022-11-28 PROCEDURE — 55700 PR BIOPSY OF PROSTATE,NEEDLE/PUNCH: CPT | Mod: S$GLB,,, | Performed by: UROLOGY

## 2022-11-28 PROCEDURE — 76872 PR US TRANSRECTAL: ICD-10-PCS | Mod: 26,S$GLB,, | Performed by: UROLOGY

## 2022-11-28 PROCEDURE — 96372 THER/PROPH/DIAG INJ SC/IM: CPT | Mod: 59,S$GLB,, | Performed by: UROLOGY

## 2022-11-28 PROCEDURE — 88305 TISSUE EXAM BY PATHOLOGIST: CPT | Mod: 59 | Performed by: PATHOLOGY

## 2022-11-28 RX ORDER — CEFTRIAXONE 1 G/1
1 INJECTION, POWDER, FOR SOLUTION INTRAMUSCULAR; INTRAVENOUS
Status: COMPLETED | OUTPATIENT
Start: 2022-11-28 | End: 2022-11-28

## 2022-11-28 RX ORDER — LIDOCAINE HYDROCHLORIDE 20 MG/ML
JELLY TOPICAL
Status: COMPLETED | OUTPATIENT
Start: 2022-11-28 | End: 2022-11-28

## 2022-11-28 RX ADMIN — LIDOCAINE HYDROCHLORIDE 10 ML: 20 JELLY TOPICAL at 12:11

## 2022-11-28 RX ADMIN — CEFTRIAXONE 1 G: 1 INJECTION, POWDER, FOR SOLUTION INTRAMUSCULAR; INTRAVENOUS at 12:11

## 2022-11-28 NOTE — PROGRESS NOTES
Chief Complaint   Patient presents with    Other     Prostate biopsy            History of Present Illness:   Vinny Stover is a 57 y.o. male here for evaluation of Other (Prostate biopsy )    11/28/22-here for TRUS biopsy. 60g prostate  11/7/22- PSA up a little. No stranguria or dysuria. He does have frequency. Nocturia x 3. Drinks a lot of coffee.   8/5/22- MRI read as PI RADS 2. Prostate measures at 61cc. No stranguria, gross hematuria. He does have frequency. PSA did decrease after last visit to 5.3.   7/5/22- 56yo male here for evaluation of elevated PSA of 7.7. Pt denies any h/o elevated PSA. Did have prostatitis 5-6 years ago and was given antibiotics at that time. Reports having urinary frequency. Does drink a lot of coffee. Nocturia x 2. No gross hematuria or dysuria. Stream is strong.   No family h/o CaP.       Review of Systems   Constitutional:  Negative for chills and fever.   Respiratory:  Negative for shortness of breath.    Cardiovascular:  Negative for chest pain.   Gastrointestinal:  Negative for abdominal pain.   Genitourinary:  Positive for frequency. Negative for difficulty urinating.   Musculoskeletal:  Positive for back pain.   All other systems reviewed and are negative.    Past Medical History:   Diagnosis Date    Aphthous stomatitis 12/26/2019    Arthritis     Colon polyp     Gastroesophageal reflux disease without esophagitis 02/28/2018    Obstructive sleep apnea syndrome 12/26/2019       Past Surgical History:   Procedure Laterality Date    COLONOSCOPY  2015    NOSE SURGERY         Family History   Problem Relation Age of Onset    Drug abuse Mother     No Known Problems Father     Colon cancer Paternal Grandfather        Social History     Tobacco Use    Smoking status: Never    Smokeless tobacco: Never   Substance Use Topics    Alcohol use: No    Drug use: No       Current Outpatient Medications   Medication Sig Dispense Refill    diazePAM (VALIUM) 5 MG tablet Take 1 po 45 minutes  before visit. 2 tablet 0    levoFLOXacin (LEVAQUIN) 500 MG tablet Take 1 po 1 hour before biopsy 1 tablet 0    meloxicam (MOBIC) 15 MG tablet Take 1 tablet (15 mg total) by mouth once daily. 30 tablet 3     Current Facility-Administered Medications   Medication Dose Route Frequency Provider Last Rate Last Admin    cefTRIAXone injection 1 g  1 g Intramuscular 1 time in Clinic/HOD Tamara Brasher MD        LIDOcaine HCl 2% urojet   Mucous Membrane 1 time in Clinic/HOD Tamara Brasher MD           Review of patient's allergies indicates:  No Known Allergies    Physical Exam  Vitals:    11/28/22 1031   BP: 138/85   Pulse: 71           General: Well-developed, well-nourished in no acute distress  HEENT: Normocephalic, atraumatic, Extraocular movements intact  Neck: supple, trachea midline, no cervical or supraclavicular lymphadenopathy  Respirations: even and unlabored  Back: midline spine, no CVA tenderness  Rectal: 7/5/22-30g prostate, no nodules or tenderness. No gross blood  Extremities: atraumatic, moves all equally, no clubbing, cyanosis or edema  Psych: normal affect  Skin: warm and dry, no lesions  Neuro: Alert and oriented, Cranial nerves II-XII grossly intact    Urinalysis  50 blood, otherwise negative    PVR: 69cc 7/5/22    PSA  3/26/19: 3.0  6/20/22: 7.7  7/8/22: 5.3  8/5/22: 4.1  11/3/22: 5.1      Procedure: (1) Transrectal Prostate Biopsy                      (2) Transrectal ultrasound of prostate                     (3) Ultrasound Guidance of Prostate Biopsy needles    Detail: Antibiotic prophylaxis was provided using levaquin and rocephin. After proper consents were obtained, the patient was prepped and draped in normal fashion in the left lateral decubitus position for TRUS/Bx.  5 ml of lidocaine jelly was instilled in the rectum.  Rectal exam noted a smooth prostate. The U/S with rectal probe was into the patient's rectum.  A spinal needle was used and 10ml of 1% lidocaine was instilled on the  side of the prostate at the base of the seminal vesicles. Systematic review was performed of the prostate, noting no ultrasonic lesions. The prostate measured to be 60g. Biopsy was then performed using an 18Ga biopsy needle directed at the base, mid and apex bilaterally for a total of 12 cores. The patient tolerated the procedure well. Estimated blood loss was 3cc.         Assessment:   1. Elevated PSA                Plan:  Elevated PSA    Other orders  -     LIDOcaine HCl 2% urojet  -     cefTRIAXone injection 1 g      I had a detailed discussion with the patient regarding post-TRUS biopsy fever and need to go to the ED for admission for IV antibiotics for any temperature above 100.4 degrees.     Follow up in about 2 weeks (around 12/12/2022).

## 2022-12-08 LAB
FINAL PATHOLOGIC DIAGNOSIS: NORMAL
GROSS: NORMAL
Lab: NORMAL

## 2022-12-13 ENCOUNTER — PATIENT MESSAGE (OUTPATIENT)
Dept: UROLOGY | Facility: CLINIC | Age: 57
End: 2022-12-13

## 2022-12-13 ENCOUNTER — OFFICE VISIT (OUTPATIENT)
Dept: UROLOGY | Facility: CLINIC | Age: 57
End: 2022-12-13
Payer: COMMERCIAL

## 2022-12-13 VITALS
HEIGHT: 65 IN | SYSTOLIC BLOOD PRESSURE: 146 MMHG | RESPIRATION RATE: 18 BRPM | BODY MASS INDEX: 31.51 KG/M2 | DIASTOLIC BLOOD PRESSURE: 74 MMHG | HEART RATE: 58 BPM | WEIGHT: 189.13 LBS

## 2022-12-13 DIAGNOSIS — R97.20 ELEVATED PSA: ICD-10-CM

## 2022-12-13 DIAGNOSIS — N13.8 BPH WITH OBSTRUCTION/LOWER URINARY TRACT SYMPTOMS: Primary | ICD-10-CM

## 2022-12-13 DIAGNOSIS — N41.1 CHRONIC PROSTATITIS: ICD-10-CM

## 2022-12-13 DIAGNOSIS — N40.1 BPH WITH OBSTRUCTION/LOWER URINARY TRACT SYMPTOMS: Primary | ICD-10-CM

## 2022-12-13 PROCEDURE — 99999 PR PBB SHADOW E&M-EST. PATIENT-LVL III: ICD-10-PCS | Mod: PBBFAC,,, | Performed by: UROLOGY

## 2022-12-13 PROCEDURE — 99999 PR PBB SHADOW E&M-EST. PATIENT-LVL III: CPT | Mod: PBBFAC,,, | Performed by: UROLOGY

## 2022-12-13 PROCEDURE — 99214 PR OFFICE/OUTPT VISIT, EST, LEVL IV, 30-39 MIN: ICD-10-PCS | Mod: S$GLB,,, | Performed by: UROLOGY

## 2022-12-13 PROCEDURE — 99214 OFFICE O/P EST MOD 30 MIN: CPT | Mod: S$GLB,,, | Performed by: UROLOGY

## 2022-12-13 RX ORDER — TAMSULOSIN HYDROCHLORIDE 0.4 MG/1
0.4 CAPSULE ORAL DAILY
Qty: 30 CAPSULE | Refills: 11 | Status: SHIPPED | OUTPATIENT
Start: 2022-12-13 | End: 2022-12-19

## 2022-12-13 NOTE — PROGRESS NOTES
Chief Complaint   Patient presents with    Other     2 wk f/u (prostate bx)           History of Present Illness:   Vinny Stover is a 57 y.o. male here for evaluation of Other (2 wk f/u (prostate bx))    12/13/22-TRUS biopsy pathology benign with chronic inflammation. He does have urinary frequency and urgency. He has a good stream. No hematuria at this point.   11/28/22-here for TRUS biopsy. 60g prostate  11/7/22- PSA up a little. No stranguria or dysuria. He does have frequency. Nocturia x 3. Drinks a lot of coffee.   8/5/22- MRI read as PI RADS 2. Prostate measures at 61cc. No stranguria, gross hematuria. He does have frequency. PSA did decrease after last visit to 5.3.   7/5/22- 58yo male here for evaluation of elevated PSA of 7.7. Pt denies any h/o elevated PSA. Did have prostatitis 5-6 years ago and was given antibiotics at that time. Reports having urinary frequency. Does drink a lot of coffee. Nocturia x 2. No gross hematuria or dysuria. Stream is strong.   No family h/o CaP.       Review of Systems   Constitutional:  Negative for chills and fever.   Respiratory:  Negative for shortness of breath.    Cardiovascular:  Negative for chest pain.   Gastrointestinal:  Negative for abdominal pain.   Genitourinary:  Positive for frequency. Negative for difficulty urinating.   Musculoskeletal:  Positive for back pain.   All other systems reviewed and are negative.    Past Medical History:   Diagnosis Date    Aphthous stomatitis 12/26/2019    Arthritis     Colon polyp     Gastroesophageal reflux disease without esophagitis 02/28/2018    Obstructive sleep apnea syndrome 12/26/2019       Past Surgical History:   Procedure Laterality Date    COLONOSCOPY  2015    NOSE SURGERY         Family History   Problem Relation Age of Onset    Drug abuse Mother     No Known Problems Father     Colon cancer Paternal Grandfather        Social History     Tobacco Use    Smoking status: Never    Smokeless tobacco: Never    Substance Use Topics    Alcohol use: No    Drug use: No       Current Outpatient Medications   Medication Sig Dispense Refill    meloxicam (MOBIC) 15 MG tablet Take 1 tablet (15 mg total) by mouth once daily. 30 tablet 3    tamsulosin (FLOMAX) 0.4 mg Cap Take 1 capsule (0.4 mg total) by mouth once daily. 30 capsule 11     No current facility-administered medications for this visit.       Review of patient's allergies indicates:  No Known Allergies    Physical Exam  Vitals:    12/13/22 1410   BP: (!) 146/74   Pulse: (!) 58   Resp: 18           General: Well-developed, well-nourished in no acute distress  HEENT: Normocephalic, atraumatic, Extraocular movements intact  Neck: supple, trachea midline, no cervical or supraclavicular lymphadenopathy  Respirations: even and unlabored  Rectal: 7/5/22-30g prostate, no nodules or tenderness. No gross blood  Extremities: atraumatic, moves all equally, no clubbing, cyanosis or edema  Psych: normal affect  Skin: warm and dry, no lesions  Neuro: Alert and oriented, Cranial nerves II-XII grossly intact    Urinalysis  50 blood, +LE    PVR: 69cc 7/5/22    PSA  3/26/19: 3.0  6/20/22: 7.7  7/8/22: 5.3  8/5/22: 4.1  11/3/22: 5.1      Assessment:   1. BPH with obstruction/lower urinary tract symptoms        2. Elevated PSA  Prostate Specific Antigen, Diagnostic      3. Chronic prostatitis                  Plan:  BPH with obstruction/lower urinary tract symptoms    Elevated PSA  -     Prostate Specific Antigen, Diagnostic; Future; Expected date: 12/13/2022    Chronic prostatitis    Other orders  -     tamsulosin (FLOMAX) 0.4 mg Cap; Take 1 capsule (0.4 mg total) by mouth once daily.  Dispense: 30 capsule; Refill: 11      Discussed sensitivity of workup with MRI + TRUS biopsy  Discussed need for continued surveillance      Follow up in about 6 months (around 6/13/2023) for labs before visit.

## 2022-12-16 ENCOUNTER — OFFICE VISIT (OUTPATIENT)
Dept: SPORTS MEDICINE | Facility: CLINIC | Age: 57
End: 2022-12-16
Payer: COMMERCIAL

## 2022-12-16 VITALS — RESPIRATION RATE: 18 BRPM | HEIGHT: 65 IN | WEIGHT: 190.69 LBS | BODY MASS INDEX: 31.77 KG/M2

## 2022-12-16 DIAGNOSIS — S62.654A CLOSED NONDISPLACED FRACTURE OF MIDDLE PHALANX OF RIGHT RING FINGER, INITIAL ENCOUNTER: Primary | ICD-10-CM

## 2022-12-16 DIAGNOSIS — R29.898 DECREASED GRIP STRENGTH OF RIGHT HAND: ICD-10-CM

## 2022-12-16 DIAGNOSIS — M25.60 DECREASED RANGE OF MOTION: ICD-10-CM

## 2022-12-16 PROCEDURE — 99999 PR PBB SHADOW E&M-EST. PATIENT-LVL III: CPT | Mod: PBBFAC,,, | Performed by: STUDENT IN AN ORGANIZED HEALTH CARE EDUCATION/TRAINING PROGRAM

## 2022-12-16 PROCEDURE — 99203 PR OFFICE/OUTPT VISIT, NEW, LEVL III, 30-44 MIN: ICD-10-PCS | Mod: S$PBB,,, | Performed by: STUDENT IN AN ORGANIZED HEALTH CARE EDUCATION/TRAINING PROGRAM

## 2022-12-16 PROCEDURE — 99999 PR PBB SHADOW E&M-EST. PATIENT-LVL III: ICD-10-PCS | Mod: PBBFAC,,, | Performed by: STUDENT IN AN ORGANIZED HEALTH CARE EDUCATION/TRAINING PROGRAM

## 2022-12-16 PROCEDURE — 99203 OFFICE O/P NEW LOW 30 MIN: CPT | Mod: S$PBB,,, | Performed by: STUDENT IN AN ORGANIZED HEALTH CARE EDUCATION/TRAINING PROGRAM

## 2022-12-16 NOTE — PROGRESS NOTES
Patient ID: Vinny Stover  YOB: 1965  MRN: [id number]    Chief Complaint: Injury and Pain of the Right Hand (middle phalanx of  right ring  finger )    Referred By: Self for right hand volar plate fracture    History of Present Illness: Vinny Stover is a right-hand dominant 57 y.o. male who presents today with difficulty closing and gripping of hid right hand.     The patient is active in  [employer] .  Occupation: [employer]    Vinny Stover is here for evaluation of his right hand s/p base of middle 4th middle proximal fracture 3 months ago. He states he was placed in a strict finger immobilizer for 8 weeks and barbie taped 4th and 5th finger. He endorses today that his pain is well controlled but there is difficulty with active finger flexion and gripping activities. He has not done OT or PT for this. He is active in [employer] and is not able to perform sport at this time due to this.     Past Medical History:   Past Medical History:   Diagnosis Date    Aphthous stomatitis 12/26/2019    Arthritis     Colon polyp     Gastroesophageal reflux disease without esophagitis 02/28/2018    Obstructive sleep apnea syndrome 12/26/2019     Past Surgical History:   Procedure Laterality Date    COLONOSCOPY  2015    NOSE SURGERY       Family History   Problem Relation Age of Onset    Drug abuse Mother     No Known Problems Father     Colon cancer Paternal Grandfather      Social History     Socioeconomic History    Marital status:    Occupational History     Employer: other   Tobacco Use    Smoking status: Never     Passive exposure: Never    Smokeless tobacco: Never   Substance and Sexual Activity    Alcohol use: No    Drug use: No    Sexual activity: Yes     Partners: Female     Medication List with Changes/Refills   Current Medications    MELOXICAM (MOBIC) 15 MG TABLET    Take 1 tablet (15 mg total) by mouth once daily.    TAMSULOSIN (FLOMAX) 0.4 MG CAP    Take 1 capsule  (0.4 mg total) by mouth once daily.     Review of patient's allergies indicates:  No Known Allergies    Physical Exam:   Body mass index is 31.73 kg/m².    GENERAL: Well appearing, in no acute distress.  HEAD: Normocephalic and atraumatic.  ENT: External ears and nose grossly normal.  EYES: EOMI bilaterally  PULMONARY: Respirations are grossly even and non-labored.  NEURO: Awake, alert, and oriented x 3.  SKIN: No obvious rashes appreciated.  PSYCH: Mood & affect are appropriate.    Detailed MSK exam:   4th R Finger:   Limited active finger flexion  Decreased  strength   Increased PIP soft tissue density  Negative varus  Negative valgus  Intact finger flexion and extension actively respectively     Imaging:  X-Ray Finger 2 or More Views Right  Narrative: EXAMINATION:  XR FINGER 2 OR MORE VIEWS RIGHT    CLINICAL HISTORY:  finger pain;  Pain in joints of right hand    TECHNIQUE:  Three views of the right ring finger are provided.    COMPARISON:  None    FINDINGS:  There is mild soft tissue swelling over the PIP joint.  A small avulsion fracture is noted of the base of the middle phalanx at the PIP joint without displacement.  Other fractures are not seen.  Impression: Avulsion fracture of the base of the middle phalanx of the right ring finger at the PIP joint.  Mild soft tissue swelling.    Electronically signed by: Mahamed Martinez MD  Date:    09/13/2022  Time:    16:19      Relevant imaging results were reviewed and interpreted by me and per my read as above.  This was discussed with the patient and / or family today.     Assessment:  Vinny Stover is a 57 y.o. male presents today 3 months status post volar plate fracture to the middle phalanx of the right ring finger.  Was splinted for 6 weeks and barbie taped and now has decreased range of motion at the ring and pinky finger with flexion at the PIP and D IP joint.  No tenderness over the volar aspect.  Has not been able to regain his range of motion and  has a slight flexion contracture as well at the PIP joint.  He is not done any formal occupational therapy.  Discussed no further immobilization at that time we will get into formal OT as soon as possible and trial 4-6 weeks.  Referred to hand surgery if not improving.  Okay to return back to physical activity in jutsu as tolerated.  Patient is eager to get back into regular activity at this time.    Closed nondisplaced fracture of middle phalanx of right ring finger, initial encounter  -     Ambulatory referral/consult to Physical/Occupational Therapy; Future; Expected date: 12/23/2022    Decreased range of motion  -     Ambulatory referral/consult to Physical/Occupational Therapy; Future; Expected date: 12/23/2022    Decreased  strength of right hand  -     Ambulatory referral/consult to Physical/Occupational Therapy; Future; Expected date: 12/23/2022         A copy of today's visit note has been sent to the referring provider.       Carlos Bundy MD    Disclaimer: This note was prepared using a voice recognition system and is likely to have sound alike errors within the text.

## 2022-12-16 NOTE — PATIENT INSTRUCTIONS
Assessment:  Vinny Stover is a 57 y.o. male   Chief Complaint   Patient presents with    Right Hand - Injury, Pain     middle phalanx of  right ring  finger        Encounter Diagnoses   Name Primary?    Closed nondisplaced fracture of middle phalanx of right ring finger, initial encounter Yes    Decreased range of motion     Decreased  strength of right hand         Plan:  An ambulatory referral to Occupational therapy [facility]. You should expect a phone call within the next few days from Centralized Scheduling. If you do not hear lfrom them, please reach out to the PT department directly at [phone].    Continue with sport as tolerated  Work on finger flexion and extension         Follow-up: As needed or sooner if there are any problems between now and then.    Thank you for choosing [facility] and Dr. Carlos Bundy for your orthopedic & sports medicine care. It is our goal to provide you with exceptional care that will help keep you healthy, active, and get you back in the game.    Please do not hesitate to reach out to us via email, phone, or MyChart with any questions, concerns, or feedback.    If you felt that you received exemplary care today, please consider leaving us feedback on Healthgrades at:  [email or web address]    If you are experiencing pain/discomfort ,or have questions after 5pm and would like to be connected to the [facility] on-call team, please call this number and specify which Sports Medicine provider is treating you: [phone]

## 2022-12-19 ENCOUNTER — OFFICE VISIT (OUTPATIENT)
Dept: UROLOGY | Facility: CLINIC | Age: 57
End: 2022-12-19
Payer: COMMERCIAL

## 2022-12-19 ENCOUNTER — CLINICAL SUPPORT (OUTPATIENT)
Dept: REHABILITATION | Facility: HOSPITAL | Age: 57
End: 2022-12-19
Attending: STUDENT IN AN ORGANIZED HEALTH CARE EDUCATION/TRAINING PROGRAM
Payer: COMMERCIAL

## 2022-12-19 VITALS
BODY MASS INDEX: 31.7 KG/M2 | WEIGHT: 190.25 LBS | SYSTOLIC BLOOD PRESSURE: 155 MMHG | HEIGHT: 65 IN | DIASTOLIC BLOOD PRESSURE: 87 MMHG | HEART RATE: 80 BPM

## 2022-12-19 DIAGNOSIS — N13.8 BPH WITH OBSTRUCTION/LOWER URINARY TRACT SYMPTOMS: ICD-10-CM

## 2022-12-19 DIAGNOSIS — S62.654A CLOSED NONDISPLACED FRACTURE OF MIDDLE PHALANX OF RIGHT RING FINGER, INITIAL ENCOUNTER: ICD-10-CM

## 2022-12-19 DIAGNOSIS — N40.1 BPH WITH OBSTRUCTION/LOWER URINARY TRACT SYMPTOMS: ICD-10-CM

## 2022-12-19 DIAGNOSIS — M25.60 DECREASED RANGE OF MOTION: ICD-10-CM

## 2022-12-19 DIAGNOSIS — R29.898 DECREASED GRIP STRENGTH OF RIGHT HAND: ICD-10-CM

## 2022-12-19 DIAGNOSIS — R97.20 ELEVATED PSA: Primary | ICD-10-CM

## 2022-12-19 DIAGNOSIS — R42 DIZZINESS: ICD-10-CM

## 2022-12-19 PROCEDURE — 97165 OT EVAL LOW COMPLEX 30 MIN: CPT | Performed by: OCCUPATIONAL THERAPIST

## 2022-12-19 PROCEDURE — 99214 OFFICE O/P EST MOD 30 MIN: CPT | Mod: S$GLB,,, | Performed by: UROLOGY

## 2022-12-19 PROCEDURE — 97110 THERAPEUTIC EXERCISES: CPT | Performed by: OCCUPATIONAL THERAPIST

## 2022-12-19 PROCEDURE — 99999 PR PBB SHADOW E&M-EST. PATIENT-LVL III: CPT | Mod: PBBFAC,,, | Performed by: UROLOGY

## 2022-12-19 PROCEDURE — 99999 PR PBB SHADOW E&M-EST. PATIENT-LVL III: ICD-10-PCS | Mod: PBBFAC,,, | Performed by: UROLOGY

## 2022-12-19 PROCEDURE — 99214 PR OFFICE/OUTPT VISIT, EST, LEVL IV, 30-39 MIN: ICD-10-PCS | Mod: S$GLB,,, | Performed by: UROLOGY

## 2022-12-19 RX ORDER — ALFUZOSIN HYDROCHLORIDE 10 MG/1
10 TABLET, EXTENDED RELEASE ORAL
Qty: 30 TABLET | Refills: 11 | Status: SHIPPED | OUTPATIENT
Start: 2022-12-19 | End: 2023-09-26 | Stop reason: ALTCHOICE

## 2022-12-19 NOTE — PROGRESS NOTES
Chief Complaint   Patient presents with    Follow-up           History of Present Illness:   Vinny Stover is a 57 y.o. male here for evaluation of Follow-up    12/19/22- here to further discuss flomax. It has helped his urination, but has been making him dizzy. He also notes ejaculatory changes. Doesn't really like to take medication.   12/13/22-TRUS biopsy pathology benign with chronic inflammation. He does have urinary frequency and urgency. He has a good stream. No hematuria at this point.   11/28/22-here for TRUS biopsy. 60g prostate  11/7/22- PSA up a little. No stranguria or dysuria. He does have frequency. Nocturia x 3. Drinks a lot of coffee.   8/5/22- MRI read as PI RADS 2. Prostate measures at 61cc. No stranguria, gross hematuria. He does have frequency. PSA did decrease after last visit to 5.3.   7/5/22- 58yo male here for evaluation of elevated PSA of 7.7. Pt denies any h/o elevated PSA. Did have prostatitis 5-6 years ago and was given antibiotics at that time. Reports having urinary frequency. Does drink a lot of coffee. Nocturia x 2. No gross hematuria or dysuria. Stream is strong.   No family h/o CaP.       Review of Systems   Constitutional:  Negative for chills and fever.   Respiratory:  Negative for shortness of breath.    Cardiovascular:  Negative for chest pain.   Gastrointestinal:  Negative for abdominal pain.   Genitourinary:  Positive for frequency. Negative for difficulty urinating.   Musculoskeletal:  Positive for back pain.   All other systems reviewed and are negative.    Past Medical History:   Diagnosis Date    Aphthous stomatitis 12/26/2019    Arthritis     Colon polyp     Gastroesophageal reflux disease without esophagitis 02/28/2018    Obstructive sleep apnea syndrome 12/26/2019       Past Surgical History:   Procedure Laterality Date    COLONOSCOPY  2015    NOSE SURGERY         Family History   Problem Relation Age of Onset    Drug abuse Mother     No Known Problems Father      Colon cancer Paternal Grandfather        Social History     Tobacco Use    Smoking status: Never     Passive exposure: Never    Smokeless tobacco: Never   Substance Use Topics    Alcohol use: No    Drug use: No       Current Outpatient Medications   Medication Sig Dispense Refill    meloxicam (MOBIC) 15 MG tablet Take 1 tablet (15 mg total) by mouth once daily. 30 tablet 3    alfuzosin (UROXATRAL) 10 mg Tb24 Take 1 tablet (10 mg total) by mouth daily with breakfast. 30 tablet 11     No current facility-administered medications for this visit.       Review of patient's allergies indicates:  No Known Allergies    Physical Exam  Vitals:    12/19/22 0927   BP: (!) 155/87   Pulse: 80             General: Well-developed, well-nourished in no acute distress  HEENT: Normocephalic, atraumatic, Extraocular movements intact  Neck: supple, trachea midline, no cervical or supraclavicular lymphadenopathy  Respirations: even and unlabored  Rectal: 7/5/22-30g prostate, no nodules or tenderness. No gross blood  Extremities: atraumatic, moves all equally, no clubbing, cyanosis or edema  Psych: normal affect  Skin: warm and dry, no lesions  Neuro: Alert and oriented, Cranial nerves II-XII grossly intact      PVR: 69cc 7/5/22    PSA  3/26/19: 3.0  6/20/22: 7.7  7/8/22: 5.3  8/5/22: 4.1  11/3/22: 5.1      Assessment:   1. Elevated PSA        2. BPH with obstruction/lower urinary tract symptoms        3. Dizziness                    Plan:  Elevated PSA    BPH with obstruction/lower urinary tract symptoms    Dizziness    Other orders  -     alfuzosin (UROXATRAL) 10 mg Tb24; Take 1 tablet (10 mg total) by mouth daily with breakfast.  Dispense: 30 tablet; Refill: 11      Okay to stop flomax  Discussed Urolift      Follow up in about 6 months (around 6/19/2023) for labs before visit.

## 2022-12-19 NOTE — PLAN OF CARE
[facility]  Occupational Therapy Initial Evaluation    Date: 12/19/2022  Name: Vinny COLIN [facility] Number: [id number]    Therapy Diagnosis:   Encounter Diagnoses   Name Primary?    Closed nondisplaced fracture of middle phalanx of right ring finger, initial encounter     Decreased range of motion     Decreased  strength of right hand      Physician: Carlos Bundy MD    Physician Orders: Evaluation and treatment   Medical Diagnosis:   S62.654A (ICD-10-CM) - Closed nondisplaced fracture of middle phalanx of right ring finger, initial encounter   M25.60 (ICD-10-CM) - Decreased range of motion   R29.898 (ICD-10-CM) - Decreased  strength of right hand     Surgical Procedure and Date: NA,   Evaluation Date: 12/19/2022  Insurance Authorization Period Expiration: 12/16/2022 - 12/16/2023  Plan of Care Certification Period: 12/19/2022 to 2/19/2022  Progress Note Due: 1/19/2022   Date of Return to MD: None scheduled  Visit # / Visits authorized: 1 / 1  FOTO: 1/3    Precautions:  Standard    Time In:4:15 pm  Time Out: 5:15 pm  Total Appointment Time (timed & untimed codes): 16 minutes    SUBJECTIVE     Date of Onset: Mid September    History of Current Condition/Mechanism of Injury: Vinny reports: he jammed his finger into someone's head 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 He has been using heat on his hand, but is still unable to close his hand for a tight  and in unable to return to [employer]yl[employer] class. He is only having mild pain with self care and home care activities. He is back at work and mostly uses a keyboard without much difficulty.He reports that the doctor told him the fracture was healed.    Falls: NA    Involved Side: right  Dominant Side: Right  Imaging: X-ray FINDINGS:  There is mild soft tissue swelling over the PIP joint.  A small avulsion fracture is noted of the base of the middle phalanx at the PIP joint without displacement.  Other fractures  are not seen.     Impression:     Avulsion fracture of the base of the middle phalanx of the right ring finger at the PIP joint.  Mild soft tissue swelling.  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     Current Functional Status   Home/Living environment: lives with their spouse      Limitation of Functional Status as follows:   ADLs/IADLs:     - Feeding: None    - Bathing: None    - Dressing/Grooming: buttoning    - Driving: None     Leisure: martial arts - not now    Pain:  Functional Pain Scale Rating 0-10: Current 1/10, worst 5/10, best 0/10   Location: proximal interphalangeal joint   Description: stiffness  Aggravating Factors: Bending  Easing Factors: ice, rest, and heat    Patient's Goals for Therapy: increase range of motion to perform martial    Medical History:   Past Medical History:   Diagnosis Date    Aphthous stomatitis 12/26/2019    Arthritis     Colon polyp     Gastroesophageal reflux disease without esophagitis 02/28/2018    Obstructive sleep apnea syndrome 12/26/2019       Surgical History:    has a past surgical history that includes Nose surgery and Colonoscopy (2015).    Medications:   has a current medication list which includes the following prescription(s): alfuzosin and meloxicam.    Allergies:   Review of patient's allergies indicates:  No Known Allergies       OBJECTIVE     Observation: Skin intact, Skin shiny/tight, Joint stiffness, Hypersensitive scarring, Edema present, and Deformities noted: proximal interphalangeal joint flexion contracture    Sensation:  Impaired  [place] Angie Monofilament Test: Yes - light touch    Special Tests:   NA    Edema: Circumferential measurements: as follows: GOAL: INCREASED RANGE OF MOTION BY 5-10 DEGREES IN ALL JOINTS OF THE RING  FINGER  Range of Motion: right Active  (Ext/Flex) Thumb Index Middle Ring Small   MP    0/85    PIP    -15/67    DIP    0/42    LEACH    179    TPM    202      Thumb Opposition: within normal limits   Palmar Abduction: within normal limits   Radial Abduction: within normal limits     Wrist Ext/Flex: within normal limits   Wrist RD/UD: within normal limits   Supination/Pronation: within normal limits   Elbow extension/flexion: within normal limits    Manual Muscle Test:   Muscle   Strength  Elbow: 5/5  Forearm: 5/5  Wrist 5/5     Strength: (ALEXANDER Dynamometer in lbs.) Average 3 trials, Position II  GOAL: INCREASED  STRENGTH BY 3-5#  Right: 67# with pain  Left: 75#    Pinch Strength: (Pinch Gauge in psi's), Average 3 trials GOAL: INCREASED PINCH STRENGTH BY 1-3 PSI IN ALL POSITIONS.  [place] Pinch R) 21psi's   L) 22psi's  3pt Pinch   R) 18psi's  L) 22psi's  2pt Pinch   R) 14psi's   L) 22psi's    Fine Indu Coordination Tests: within functional limits     Limitation/Restriction for FOTO hand Survey    Therapist reviewed FOTO scores for Vinny COLIN Ronaljustin on 12/19/2022.   FOTO documents entered into [employer] - see Media section.    Limitation Score: 43%         Treatment   Total Treatment time (time-based codes) separate from Evaluation: 18 minutes    Vinny received the treatments listed below:     Supervised modalities after being cleared for contradictions: Hot Pack - 5 minutes for increased tissue extensibility    Therapeutic exercises to develop ROM and flexibility for 8 minutes, including:  Joint blocking of the ring finger all joints: 10x each  Hook fist: 10x  Flat fist: 10x    Passive range of motion:  Proximal interphalangeal joint flexion and extension  Distal interphalangeal joint flexion/extension  Metacarpal phalangeal joint flexion    Therapeutic activities to improve functional performance for 5  minutes, including:  Pillow case curls  Dowel curls: x3      Patient Education and Home Exercises      Education  provided:   - stretching to tolerance and consistently    Written Home Exercises Provided: yes.  Exercises were reviewed and Vinny was able to demonstrate them prior to the end of the session.  Vinny demonstrated good  understanding of the education provided. See EMR under Patient Instructions for exercises provided during therapy sessions.     Pt was advised to perform these exercises free of pain, and to stop performing them if pain occurs.    Patient/Family Education: role of OT, goals for OT, scheduling/cancellations - pt verbalized understanding. Discussed insurance limitations with patient.      ASSESSMENT     Vinny Stover is a 57 y.o. male referred to outpatient occupational therapy and presents with a medical diagnosis of Closed nondisplaced fracture of middle phalanx of right ring finger,.  Patient presents with the following therapy deficits: Decreased ROM, Decreased  strength, Decreased pinch strength, Decreased muscle strength, Decreased functional hand use, Increased pain, Edema, and Joint Stiffness and demonstrates limitations as described in the chart below. Following medical record review it is determined that pt will benefit from occupational therapy services in order to maximize pain free and/or functional use of right ring finger. The following goals were discussed with the patient and patient is in agreement with them as to be addressed in the treatment plan. The patient's rehab potential is Good.     Anticipated barriers to occupational therapy: compliance with home exercise program   Pt has no cultural, educational or language barriers to learning provided.    Profile and History Assessment of Occupational Performance Level of Clinical Decision Making Complexity Score   Occupational Profile:   Vinny Stover is a 57 y.o. male who lives with their spouse and is currently employed Vinny Stover has difficulty with  ADLs and IADLs as listed previously, which  Affecting hisdaily  functional abilities.      Comorbidities:    has a past medical history of Aphthous stomatitis, Arthritis, Colon polyp, Gastroesophageal reflux disease without esophagitis, and Obstructive sleep apnea syndrome.    Medical and Therapy History Review:   Brief               Performance Deficits    Physical:  Joint Mobility  Edema   Strength  Pinch Strength  Pain    Cognitive:  No Deficits    Psychosocial:    No Deficits     Clinical Decision Making:  low    Assessment Process:  Problem-Focused Assessments    Modification/Need for Assistance:  Not Necessary    Intervention Selection:  Limited Treatment Options       low  Based on PMHX, co morbidities , data from assessments and functional level of assistance required with task and clinical presentation directly impacting function.       The following goals were discussed with the patient and patient is in agreement with them as to be addressed in the treatment plan.     Goals:   GOALS:     Short Term Goals:  4 weeks     Pain: Pt will demonstrate improved pain by reports of less than or equal to 1/10 worst pain on the verbal rating scale in order to progress toward maximal functional ability and improve QOL.     Mobility: Patient will improve AROM to 50% of stated goals, listed in objective measures above, in order to progress towards independence with functional activities.      Strength: Patient will improve strength to 50% of stated goals, listed in objective measures above, in order to progress towards independence with functional activities.      Self Care: Patient will demonstrate improved self care skills and be able to button his shirt without difficulty,in order to progress towards independence with functional activities.      HEP: Patient will demonstrate independence with HEP in order to progress toward functional independence.        Long Term Goals:  8 weeks     Pain: Pt will demonstrate improved pain by reports of less than or equal to 0/10 worst pain  on the verbal rating scale in order to progress toward maximal functional ability and improve QOL.       Function: Patient will demonstrate improved function as indicated by a functional limitation score of less than or equal to 28 out of 100 on FOTO.     Mobility: Patient will improve AROM to stated goals, listed in objective measures above, in order to return to maximal functional potential and improve quality of life.     Strength: Patient will improve strength to stated goals, listed in objective measures above, in order to improve functional independence and quality of life.     Patient will return to normal ADL's, IADL's, community involvement, recreational activities, and work-related activities with less than or equal to 0/10 pain and maximal function.                PLAN   Plan of Care Certification: 12/19/2022 to 2/19/2022.     Outpatient Occupational Therapy 2 times weekly for 8 weeks to include the following interventions: Manual therapy/joint mobilizations, Modalities for pain management, Therapeutic exercises/activities., and Strengthening.      Adia Lewis, OTR      I CERTIFY THE NEED FOR THESE SERVICES FURNISHED UNDER THIS PLAN OF TREATMENT AND WHILE UNDER MY CARE

## 2022-12-21 ENCOUNTER — CLINICAL SUPPORT (OUTPATIENT)
Dept: REHABILITATION | Facility: HOSPITAL | Age: 57
End: 2022-12-21
Payer: COMMERCIAL

## 2022-12-21 DIAGNOSIS — R29.898 DECREASED GRIP STRENGTH OF RIGHT HAND: ICD-10-CM

## 2022-12-21 DIAGNOSIS — M25.60 DECREASED RANGE OF MOTION: ICD-10-CM

## 2022-12-21 DIAGNOSIS — Z78.9 DECREASED ACTIVITIES OF DAILY LIVING (ADL): ICD-10-CM

## 2022-12-21 PROCEDURE — 97110 THERAPEUTIC EXERCISES: CPT | Performed by: OCCUPATIONAL THERAPIST

## 2022-12-21 NOTE — PROGRESS NOTES
OCCUPATIONAL THERAPY DAILY NOTE    Name: Vinny Stover  [facility] Number: [id number]    Therapy Diagnosis:   Encounter Diagnoses   Name Primary?    Decreased range of motion     Decreased  strength of right hand     Decreased activities of daily living (ADL)      Physician: Carlos Bundy MD    Visit Date: 12/21/2022     Physician Orders: Evaluation and treatment   Medical Diagnosis:   S62.654A (ICD-10-CM) - Closed nondisplaced fracture of middle phalanx of right ring finger, initial encounter   M25.60 (ICD-10-CM) - Decreased range of motion   R29.898 (ICD-10-CM) - Decreased  strength of right hand      Surgical Procedure and Date: NA,   Evaluation Date: 12/19/2022  Insurance Authorization Period Expiration: 12/19/2022 - 12/31/2022  Plan of Care Certification Period: 12/19/2022 to 2/19/2022  Progress Note Due: 1/19/2022   Date of Return to MD: None scheduled  Visit # / Visits authorized: 1 / 10   episode 2  FOTO: 1/3     Precautions:  Standard     Time In: 3:40 pm  Time Out: 4:30 pm  Total Appointment Time (timed & untimed codes): 50 minutes  Total billable time: 40 minutes      SUBJECTIVE     Today, pt reports: he is sore from the exercises given during his evaluation.  He/She was compliant with home exercise program.  Response to previous treatment: increased pain.  Functional change: None    Pre-Treatment Pain: 5/10  Post-Treatment Pain: 3/10  Location: PROXIMAL INTERPHALANGEAL JOINT radial aspect  TREATMENT     Vinny received therapeutic exercises to develop ROM for 30 minutes including:    Exercise 12/21/2022   Joint blocking of the ring finger all joints:   Hook fist  Flat fist:      Passive range of motion:  Proximal interphalangeal joint flexion and extension  Distal interphalangeal joint flexion/extension  Metacarpal phalangeal joint flexion    10x each   Reverse joint blocking proximal interphalangeal joint ring finger 20x   Digit abduction/adduction 10x   Ring finger extension 20x                        Vinny received the following manual therapy techniques: Soft tissue Mobilization /IASTM were applied to the: ring finger for 5 minutes, including:  STM/IASTM of right ring finger  Retrograde massage      Vinny participated in dynamic functional therapeutic activities to improve functional performance for 5 minutes, including:    Exercise 12/21/2022   Pillow case curls 3 minutes   Dowel curls x3 20x each                                Vinny received the following supervised modalities after being cleared for contradictions:     Vinny received hot pack for 5 minutes to right hand to increase tissue extensibility and decrease pain.    Vinny received cold pack for 5 minutes to right hand to decrease swelling and pain.      Home Exercises Provided and Patient Education Provided     Education/Self-Care provided: (2 minutes)  Patient educated on biomechanical justification for therapeutic exercise and importance of compliance with HEP in order to improve overall impairments and QOL   Patient educated on digit swelling and gentle range of motion exercises.    Written Home Exercises Provided: Patient instructed to cont prior HEP.  Exercises were reviewed and Vinny was able to demonstrate them prior to the end of the session.  Vinny demonstrated good  understanding of the education provided.     See EMR under Patient Instructions for exercises provided  12/19/2022 .    ASSESSMENT   Pt tolerated manual therapy well with reports of decreased pain and increased range of motion following intervention. Pt tolerated exercise well with reports of increased fatigue but no increased pain. Pt demonstrated good understanding of exercises and required minimal cueing to maintain proper form.  Patient demonstrated increased flexibility of the ring finger distal interphalangeal joint and proximal interphalangeal joint with persistent swelling of the proximal interphalangeal joint and pain over the radial collateral  sanaz Casillas Is progressing well towards his goals.   Pt prognosis is Good.     Pt will continue to benefit from skilled outpatient occupational therapy to address the deficits listed in the problem list box on initial evaluation, provide pt/family education and to maximize pt's level of independence in the home and community environment.     Pt's spiritual, cultural and educational needs considered and pt agreeable to plan of care and goals.     Anticipated barriers to occupational therapy: swelling and pain    GOALS:     Short Term Goals:  4 weeks  12/21/2022   Pain: Pt will demonstrate improved pain by reports of less than or equal to 1/10 worst pain on the verbal rating scale in order to progress toward maximal functional ability and improve QOL.  ongoing   Mobility: Patient will improve AROM to 50% of stated goals, listed in objective measures above, in order to progress towards independence with functional activities.   ongoing   Strength: Patient will improve strength to 50% of stated goals, listed in objective measures above, in order to progress towards independence with functional activities.   ongoing   Self Care: Patient will demonstrate improved self care skills and be able to button his shirt without difficulty,in order to progress towards independence with functional activities.   ongoing   HEP: Patient will demonstrate independence with HEP in order to progress toward functional independence.  ongoing      Long Term Goals:  8 weeks  12/21/2022   Pain: Pt will demonstrate improved pain by reports of less than or equal to 0/10 worst pain on the verbal rating scale in order to progress toward maximal functional ability and improve QOL.       Function: Patient will demonstrate improved function as indicated by a functional limitation score of less than or equal to 28 out of 100 on FOTO.     Mobility: Patient will improve AROM to stated goals, listed in objective measures above, in order to return  to maximal functional potential and improve quality of life.     Strength: Patient will improve strength to stated goals, listed in objective measures above, in order to improve functional independence and quality of life.     Patient will return to normal ADL's, IADL's, community involvement, recreational activities, and work-related activities with less than or equal to 0/10 pain and maximal function.                PLAN   Continue Plan of Care (POC) and progress per patient tolerance.    Aida Lewis, OTR, LOTR

## 2022-12-27 ENCOUNTER — CLINICAL SUPPORT (OUTPATIENT)
Dept: REHABILITATION | Facility: HOSPITAL | Age: 57
End: 2022-12-27
Payer: COMMERCIAL

## 2022-12-27 DIAGNOSIS — R29.898 DECREASED GRIP STRENGTH OF RIGHT HAND: ICD-10-CM

## 2022-12-27 DIAGNOSIS — Z78.9 DECREASED ACTIVITIES OF DAILY LIVING (ADL): ICD-10-CM

## 2022-12-27 DIAGNOSIS — M25.60 DECREASED RANGE OF MOTION: Primary | ICD-10-CM

## 2022-12-27 PROCEDURE — 97530 THERAPEUTIC ACTIVITIES: CPT

## 2022-12-27 PROCEDURE — 97140 MANUAL THERAPY 1/> REGIONS: CPT

## 2022-12-27 PROCEDURE — 97110 THERAPEUTIC EXERCISES: CPT

## 2022-12-27 NOTE — PROGRESS NOTES
OCCUPATIONAL THERAPY DAILY NOTE    Name: Vinny Stover  [facility] Number: [id number]    Therapy Diagnosis:   Encounter Diagnoses   Name Primary?    Decreased range of motion Yes    Decreased  strength of right hand     Decreased activities of daily living (ADL)      Physician: Carlos Bundy MD    Visit Date: 12/27/2022     Physician Orders: Evaluation and treatment   Medical Diagnosis:   S62.654A (ICD-10-CM) - Closed nondisplaced fracture of middle phalanx of right ring finger, initial encounter   M25.60 (ICD-10-CM) - Decreased range of motion   R29.898 (ICD-10-CM) - Decreased  strength of right hand      Surgical Procedure and Date: NA,   Evaluation Date: 12/19/2022  Insurance Authorization Period Expiration: 12/19/2022 - 12/31/2022  Plan of Care Certification Period: 12/19/2022 to 2/19/2022  Progress Note Due: 1/19/2022   Date of Return to MD: None scheduled  Visit # / Visits authorized: 2 / 10   Episode 2  FOTO: 1/3     Precautions:  Standard     Time In: 3:30 pm  Time Out: 4:15 pm  Total Appointment Time (timed & untimed codes): 45 minutes  Total billable time: 45 minutes      SUBJECTIVE     Today, pt reports: he is sore from the exercises given during his evaluation.  He/She was compliant with home exercise program.  Response to previous treatment: increased pain.  Functional change: None    Pre-Treatment Pain: 5/10  Post-Treatment Pain: 3/10  Location: PROXIMAL INTERPHALANGEAL JOINT radial aspect  TREATMENT     Vinny received therapeutic exercises to develop ROM for 30 minutes including:    Exercise 12/27/2022   Joint blocking of the ring finger all joints:   Hook fist  Flat fist:      Passive range of motion:  Proximal interphalangeal joint flexion and extension  Distal interphalangeal joint flexion/extension  Metacarpal phalangeal joint flexion    10x each   Reverse joint blocking proximal interphalangeal joint ring finger 20x   Digit abduction/adduction 10x   Ring finger extension 20x                        Vinny received the following manual therapy techniques: Soft tissue Mobilization /IASTM were applied to the: ring finger for 5 minutes, including:  STM/IASTM of right ring finger  Retrograde massage      Vinny participated in dynamic functional therapeutic activities to improve functional performance for 5 minutes, including:    Exercise 12/27/2022   Pillow case curls 3 minutes   Dowel curls x3 20x each                                Vinny received the following supervised modalities after being cleared for contradictions:     Vinny received hot pack for 5 minutes to right hand to increase tissue extensibility and decrease pain.    Vinny received cold pack for 5 minutes to right hand to decrease swelling and pain.      Home Exercises Provided and Patient Education Provided     Education/Self-Care provided: (2 minutes)  Patient educated on biomechanical justification for therapeutic exercise and importance of compliance with HEP in order to improve overall impairments and QOL   Patient educated on digit swelling and gentle range of motion exercises.    Written Home Exercises Provided: Patient instructed to cont prior HEP.  Exercises were reviewed and Vinny was able to demonstrate them prior to the end of the session.  Vinny demonstrated good  understanding of the education provided.     See EMR under Patient Instructions for exercises provided  12/19/2022 .    ASSESSMENT   Pt tolerated manual therapy well with reports of decreased pain and increased range of motion following intervention. Pt tolerated exercise well with reports of increased fatigue but no increased pain. Pt demonstrated good understanding of exercises and required minimal cueing to maintain proper form.  Patient demonstrated increased flexibility of the ring finger distal interphalangeal joint and proximal interphalangeal joint with persistent swelling of the proximal interphalangeal joint and pain over the radial  collateral ligament.    Vinny Is progressing well towards his goals.   Pt prognosis is Good.     Pt will continue to benefit from skilled outpatient occupational therapy to address the deficits listed in the problem list box on initial evaluation, provide pt/family education and to maximize pt's level of independence in the home and community environment.     Pt's spiritual, cultural and educational needs considered and pt agreeable to plan of care and goals.     Anticipated barriers to occupational therapy: swelling and pain    GOALS:     Short Term Goals:  4 weeks  12/21/2022   Pain: Pt will demonstrate improved pain by reports of less than or equal to 1/10 worst pain on the verbal rating scale in order to progress toward maximal functional ability and improve QOL.  ongoing   Mobility: Patient will improve AROM to 50% of stated goals, listed in objective measures above, in order to progress towards independence with functional activities.   ongoing   Strength: Patient will improve strength to 50% of stated goals, listed in objective measures above, in order to progress towards independence with functional activities.   ongoing   Self Care: Patient will demonstrate improved self care skills and be able to button his shirt without difficulty,in order to progress towards independence with functional activities.   ongoing   HEP: Patient will demonstrate independence with HEP in order to progress toward functional independence.  ongoing      Long Term Goals:  8 weeks  12/21/2022   Pain: Pt will demonstrate improved pain by reports of less than or equal to 0/10 worst pain on the verbal rating scale in order to progress toward maximal functional ability and improve QOL.       Function: Patient will demonstrate improved function as indicated by a functional limitation score of less than or equal to 28 out of 100 on FOTO.     Mobility: Patient will improve AROM to stated goals, listed in objective measures above, in  order to return to maximal functional potential and improve quality of life.     Strength: Patient will improve strength to stated goals, listed in objective measures above, in order to improve functional independence and quality of life.     Patient will return to normal ADL's, IADL's, community involvement, recreational activities, and work-related activities with less than or equal to 0/10 pain and maximal function.                PLAN   Continue Plan of Care (POC) and progress per patient tolerance.    Yessica Ramirez OT, LORENAR

## 2023-01-09 ENCOUNTER — CLINICAL SUPPORT (OUTPATIENT)
Dept: REHABILITATION | Facility: HOSPITAL | Age: 58
End: 2023-01-09
Payer: COMMERCIAL

## 2023-01-09 DIAGNOSIS — R29.898 DECREASED GRIP STRENGTH OF RIGHT HAND: ICD-10-CM

## 2023-01-09 DIAGNOSIS — M25.60 DECREASED RANGE OF MOTION: Primary | ICD-10-CM

## 2023-01-09 DIAGNOSIS — Z78.9 DECREASED ACTIVITIES OF DAILY LIVING (ADL): ICD-10-CM

## 2023-01-09 PROCEDURE — 97110 THERAPEUTIC EXERCISES: CPT | Performed by: OCCUPATIONAL THERAPIST

## 2023-01-09 PROCEDURE — 97140 MANUAL THERAPY 1/> REGIONS: CPT | Performed by: OCCUPATIONAL THERAPIST

## 2023-01-09 NOTE — PROGRESS NOTES
OCCUPATIONAL THERAPY DAILY NOTE    Name: Vinny Stover  [facility] Number: [id number]    Therapy Diagnosis:   Encounter Diagnoses   Name Primary?    Decreased range of motion Yes    Decreased  strength of right hand     Decreased activities of daily living (ADL)      Physician: Carlos Bundy MD    Visit Date: 1/9/2023     Physician Orders: Evaluation and treatment   Medical Diagnosis:   S62.654A (ICD-10-CM) - Closed nondisplaced fracture of middle phalanx of right ring finger, initial encounter   M25.60 (ICD-10-CM) - Decreased range of motion   R29.898 (ICD-10-CM) - Decreased  strength of right hand      Surgical Procedure and Date: NA,   Evaluation Date: 12/19/2022  Insurance Authorization Period Expiration: 1/1/2023 - 12/31/2023  Plan of Care Certification Period: 12/19/2022 to 2/19/2022  Progress Note Due: 1/19/2022   Date of Return to MD: None scheduled  Visit # / Visits authorized: 1/ 25  Episode 3  FOTO: 1/3     Precautions:  Standard     Time In: 4:40 pm  Time Out: 5:30 pm  Total Appointment Time (timed & untimed codes): 50 minutes  Total billable time: 40 minutes      SUBJECTIVE     Today, pt reports: feels that sometimes his finger is better than others. He has found that putting his hand under running water is helping.  He/She was compliant with home exercise program.  Response to previous treatment: increased range of motion .  Functional change: easier to put his hand in his pocket without his ring finger getting caught    Pre-Treatment Pain: 3/10  Post-Treatment Pain: 3/10  Location: PROXIMAL INTERPHALANGEAL JOINT radial aspect  TREATMENT   OBJECTIVE:.  Range of Motion: right Active  (Ext/Flex) Thumb Index Middle Ring Small   MP       0/85     PIP       -15/67 to -17/85     DIP       0/42     LEACH       179     TPM       202     Passive range of motion: -15/90 degrees     Vinny received therapeutic exercises to develop ROM for 25 minutes including:    Exercise 1/9/2023   Joint blocking of the  ring finger all joints:   Hook fist  Flat fist:      Passive range of motion:  Proximal interphalangeal joint flexion and extension  Distal interphalangeal joint flexion/extension  Metacarpal phalangeal joint flexion    10x each   Reverse joint blocking proximal interphalangeal joint ring finger 20x   Digit abduction/adduction 10x   Ring finger extension 20x                       Vinny received the following manual therapy techniques: Soft tissue Mobilization /IASTM were applied to the: ring finger for 8 minutes, including:  STM/IASTM of right ring finger  Proximal interphalangeal joint mobilization inferior glides/distraction      Vinny participated in dynamic functional therapeutic activities to improve functional performance for 2 minutes, including:    Exercise 1/9/2023   Pillow case curls 1 minutes   Dowel curls x3 20x each                                Vinny received the following supervised modalities after being cleared for contradictions:     Vinny received hot pack for 5 minutes to right hand to increase tissue extensibility and decrease pain.    Vinny received cold pack for 5 minutes to right hand to decrease swelling and pain.      Home Exercises Provided and Patient Education Provided     Education/Self-Care provided: (2 minutes)  Patient educated on biomechanical justification for therapeutic exercise and importance of compliance with HEP in order to improve overall impairments and QOL   Patient educated on digit swelling and gentle range of motion exercises.    Written Home Exercises Provided: Patient instructed to cont prior HEP.  Exercises were reviewed and Vinny was able to demonstrate them prior to the end of the session.  Vinny demonstrated good  understanding of the education provided.     See EMR under Patient Instructions for exercises provided  12/19/2022 .    ASSESSMENT   Pt tolerated manual therapy well with reports of decreased pain and increased range of motion following  intervention. Pt tolerated exercise well with reports of increased fatigue but no increased pain. Pt demonstrated good understanding of exercises and required minimal cueing to maintain proper form.  Patient demonstrated increased active range of motion of the proximal interphalangeal joint and pain with palpation over the ulnar collateral ligament.    Vinny Is progressing well towards his goals.   Pt prognosis is Good.     Pt will continue to benefit from skilled outpatient occupational therapy to address the deficits listed in the problem list box on initial evaluation, provide pt/family education and to maximize pt's level of independence in the home and community environment.     Pt's spiritual, cultural and educational needs considered and pt agreeable to plan of care and goals.     Anticipated barriers to occupational therapy: swelling and pain    GOALS:     Short Term Goals:  4 weeks  1/9/2022   Pain: Pt will demonstrate improved pain by reports of less than or equal to 1/10 worst pain on the verbal rating scale in order to progress toward maximal functional ability and improve QOL.  ongoing   Mobility: Patient will improve AROM to 50% of stated goals, listed in objective measures above, in order to progress towards independence with functional activities.  progressing   Strength: Patient will improve strength to 50% of stated goals, listed in objective measures above, in order to progress towards independence with functional activities.  progressing   Self Care: Patient will demonstrate improved self care skills and be able to button his shirt without difficulty,in order to progress towards independence with functional activities.  progressing   HEP: Patient will demonstrate independence with HEP in order to progress toward functional independence.  ongoing      Long Term Goals:  8 weeks  1/9/2022   Pain: Pt will demonstrate improved pain by reports of less than or equal to 0/10 worst pain on the verbal  rating scale in order to progress toward maximal functional ability and improve QOL.       Function: Patient will demonstrate improved function as indicated by a functional limitation score of less than or equal to 28 out of 100 on FOTO.     Mobility: Patient will improve AROM to stated goals, listed in objective measures above, in order to return to maximal functional potential and improve quality of life.     Strength: Patient will improve strength to stated goals, listed in objective measures above, in order to improve functional independence and quality of life.     Patient will return to normal ADL's, IADL's, community involvement, recreational activities, and work-related activities with less than or equal to 0/10 pain and maximal function.                PLAN   Continue Plan of Care (POC) and progress per patient tolerance.    Aida Lewis, MANDIER, LORENAR

## 2023-01-11 ENCOUNTER — OFFICE VISIT (OUTPATIENT)
Dept: INTERNAL MEDICINE | Facility: CLINIC | Age: 58
End: 2023-01-11
Payer: COMMERCIAL

## 2023-01-11 VITALS
HEIGHT: 65 IN | SYSTOLIC BLOOD PRESSURE: 110 MMHG | TEMPERATURE: 98 F | BODY MASS INDEX: 31.4 KG/M2 | OXYGEN SATURATION: 95 % | HEART RATE: 86 BPM | DIASTOLIC BLOOD PRESSURE: 70 MMHG | WEIGHT: 188.5 LBS | RESPIRATION RATE: 17 BRPM

## 2023-01-11 DIAGNOSIS — R05.9 COUGH, UNSPECIFIED TYPE: Primary | ICD-10-CM

## 2023-01-11 DIAGNOSIS — J02.9 SORE THROAT: ICD-10-CM

## 2023-01-11 LAB
CTP QC/QA: YES
SARS-COV-2 RDRP RESP QL NAA+PROBE: NEGATIVE

## 2023-01-11 PROCEDURE — 99214 OFFICE O/P EST MOD 30 MIN: CPT | Mod: S$GLB,,, | Performed by: PHYSICIAN ASSISTANT

## 2023-01-11 PROCEDURE — 99999 PR PBB SHADOW E&M-EST. PATIENT-LVL III: ICD-10-PCS | Mod: PBBFAC,,, | Performed by: PHYSICIAN ASSISTANT

## 2023-01-11 PROCEDURE — 99999 PR PBB SHADOW E&M-EST. PATIENT-LVL III: CPT | Mod: PBBFAC,,, | Performed by: PHYSICIAN ASSISTANT

## 2023-01-11 PROCEDURE — 87635 SARS-COV-2 COVID-19 AMP PRB: CPT | Mod: QW,S$GLB,, | Performed by: PHYSICIAN ASSISTANT

## 2023-01-11 PROCEDURE — 99214 PR OFFICE/OUTPT VISIT, EST, LEVL IV, 30-39 MIN: ICD-10-PCS | Mod: S$GLB,,, | Performed by: PHYSICIAN ASSISTANT

## 2023-01-11 PROCEDURE — 87635: ICD-10-PCS | Mod: QW,S$GLB,, | Performed by: PHYSICIAN ASSISTANT

## 2023-01-11 RX ORDER — PROMETHAZINE HYDROCHLORIDE AND DEXTROMETHORPHAN HYDROBROMIDE 6.25; 15 MG/5ML; MG/5ML
5 SYRUP ORAL NIGHTLY PRN
Qty: 120 ML | Refills: 0 | Status: SHIPPED | OUTPATIENT
Start: 2023-01-11 | End: 2023-03-14

## 2023-01-11 RX ORDER — BENZONATATE 200 MG/1
200 CAPSULE ORAL 3 TIMES DAILY PRN
Qty: 30 CAPSULE | Refills: 0 | Status: SHIPPED | OUTPATIENT
Start: 2023-01-11 | End: 2023-01-21

## 2023-01-11 NOTE — PROGRESS NOTES
"Subjective:      Patient ID: Vinny Stover is a 57 y.o. male.    Chief Complaint: Cough    Patient is new to me, being seen today for cough x2days.     Recently returned from trip, flew on plane    Last visit Sept 2022 with Eva Ortiz PA-C    Cough  This is a new problem. The current episode started in the past 7 days. The problem has been unchanged. Associated symptoms include a sore throat. Pertinent negatives include no chills, ear pain, fever, headaches, postnasal drip, rash, rhinorrhea, shortness of breath or wheezing. Treatments tried: cough drops, Nyquil.   Review of Systems   Constitutional:  Positive for fatigue. Negative for chills, diaphoresis and fever.   HENT:  Positive for sore throat. Negative for congestion, ear pain, postnasal drip and rhinorrhea.    Respiratory:  Positive for cough. Negative for shortness of breath and wheezing.    Gastrointestinal:  Negative for abdominal pain, constipation, diarrhea, nausea and vomiting.   Skin:  Negative for rash.   Neurological:  Negative for dizziness, light-headedness and headaches.     Objective:   /70   Pulse 86   Temp 98.2 °F (36.8 °C)   Resp 17   Ht 5' 5" (1.651 m)   Wt 85.5 kg (188 lb 7.9 oz)   SpO2 95%   BMI 31.37 kg/m²   Physical Exam  Constitutional:       General: He is not in acute distress.     Appearance: Normal appearance. He is well-developed. He is not ill-appearing.   HENT:      Head: Normocephalic and atraumatic.      Mouth/Throat:      Pharynx: Oropharynx is clear.   Cardiovascular:      Rate and Rhythm: Normal rate and regular rhythm.      Heart sounds: Normal heart sounds. No murmur heard.  Pulmonary:      Effort: Pulmonary effort is normal. No respiratory distress.      Breath sounds: Normal breath sounds. No decreased breath sounds.   Musculoskeletal:      Right lower leg: No edema.      Left lower leg: No edema.   Skin:     General: Skin is warm and dry.      Findings: No rash.   Psychiatric:         Speech: Speech " normal.         Behavior: Behavior normal.         Thought Content: Thought content normal.     Assessment:      1. Cough, unspecified type    2. Sore throat       Plan:   Cough, unspecified type  -     POCT COVID-19 Rapid Screening  -     benzonatate (TESSALON) 200 MG capsule; Take 1 capsule (200 mg total) by mouth 3 (three) times daily as needed for Cough.  Dispense: 30 capsule; Refill: 0  -     promethazine-dextromethorphan (PROMETHAZINE-DM) 6.25-15 mg/5 mL Syrp; Take 5 mLs by mouth nightly as needed (Cough).  Dispense: 120 mL; Refill: 0    Sore throat  -     (Magic mouthwash) 1:1:1 diphenhydrAMINE(Benadryl) 12.5mg/5ml liq, aluminum & magnesium hydroxide-simethicone (Maalox), LIDOcaine viscous 2%; Swish and spit 15 mLs every 4 (four) hours as needed (throat pain).  Dispense: 240 mL; Refill: 0      Advised patient based on time frame and symptomology this is likely a viral upper respiratory infection.  It does not require antibiotics at this time.    Will treat symptoms with OTC meds.      Discussed worsening signs/symptoms and when to return to clinic or go to ED.   Patient expresses understanding and agrees with treatment plan.

## 2023-01-15 ENCOUNTER — OFFICE VISIT (OUTPATIENT)
Dept: URGENT CARE | Facility: CLINIC | Age: 58
End: 2023-01-15
Payer: COMMERCIAL

## 2023-01-15 VITALS
BODY MASS INDEX: 31.32 KG/M2 | HEART RATE: 82 BPM | OXYGEN SATURATION: 99 % | RESPIRATION RATE: 18 BRPM | WEIGHT: 188 LBS | SYSTOLIC BLOOD PRESSURE: 133 MMHG | TEMPERATURE: 97 F | HEIGHT: 65 IN | DIASTOLIC BLOOD PRESSURE: 77 MMHG

## 2023-01-15 DIAGNOSIS — R09.81 NASAL CONGESTION: ICD-10-CM

## 2023-01-15 DIAGNOSIS — H66.92 OTITIS OF LEFT EAR: Primary | ICD-10-CM

## 2023-01-15 DIAGNOSIS — J02.9 SORE THROAT: ICD-10-CM

## 2023-01-15 DIAGNOSIS — H92.02 OTALGIA OF LEFT EAR: ICD-10-CM

## 2023-01-15 LAB
CTP QC/QA: YES
MOLECULAR STREP A: NEGATIVE

## 2023-01-15 PROCEDURE — 99213 OFFICE O/P EST LOW 20 MIN: CPT | Mod: S$GLB,,, | Performed by: NURSE PRACTITIONER

## 2023-01-15 PROCEDURE — 99213 PR OFFICE/OUTPT VISIT, EST, LEVL III, 20-29 MIN: ICD-10-PCS | Mod: S$GLB,,, | Performed by: NURSE PRACTITIONER

## 2023-01-15 PROCEDURE — 87651 STREP A DNA AMP PROBE: CPT | Mod: QW,S$GLB,, | Performed by: NURSE PRACTITIONER

## 2023-01-15 PROCEDURE — 87651 POCT STREP A MOLECULAR: ICD-10-PCS | Mod: QW,S$GLB,, | Performed by: NURSE PRACTITIONER

## 2023-01-15 RX ORDER — IPRATROPIUM BROMIDE 42 UG/1
2 SPRAY, METERED NASAL 4 TIMES DAILY
Qty: 15 ML | Refills: 0 | Status: SHIPPED | OUTPATIENT
Start: 2023-01-15 | End: 2023-03-20

## 2023-01-15 RX ORDER — AMOXICILLIN AND CLAVULANATE POTASSIUM 875; 125 MG/1; MG/1
1 TABLET, FILM COATED ORAL EVERY 12 HOURS
Qty: 20 TABLET | Refills: 0 | Status: SHIPPED | OUTPATIENT
Start: 2023-01-15 | End: 2023-01-25

## 2023-01-15 NOTE — PROGRESS NOTES
"Subjective:       Patient ID: Vinny Stover is a 57 y.o. male.    Vitals:  height is 5' 5" (1.651 m) and weight is 85.3 kg (188 lb). His oral temperature is 97 °F (36.1 °C). His blood pressure is 133/77 and his pulse is 82. His respiration is 18 and oxygen saturation is 99%.     Chief Complaint: Sinus Problem    57 year old male presents for evaluation of sinus pressure, sore throat (managed with magic mouthwash), and cough x 7 days. Reports worsening with onset of left ear pain today. Was seen on 01/11 for sore throat and cough and prescribed promethazine DM, Tesslon Pearls, and magic Mouthwash. OTC Steven BE Pt states that he's not feeling any better. Denies fever/chills/sweats.  No meds taken today    Sinus Problem  This is a recurrent problem. The current episode started in the past 7 days. The problem has been waxing and waning since onset. There has been no fever. Associated symptoms include congestion, coughing, ear pain (left ear throb 7/8), sinus pressure, sneezing and a sore throat. Pertinent negatives include no chills, diaphoresis or headaches (sinus pressure).     Constitution: Positive for fatigue. Negative for activity change, chills, sweating and fever.   HENT:  Positive for ear pain (left ear throb 7/8), congestion, sinus pain, sinus pressure and sore throat. Negative for postnasal drip.    Neck: neck negative.   Cardiovascular: Negative.    Eyes: Negative.    Respiratory:  Positive for cough.    Gastrointestinal: Negative.    Endocrine: negative.   Genitourinary: Negative.    Skin: Negative.    Allergic/Immunologic: Positive for sneezing.   Neurological:  Negative for headaches (sinus pressure).   Hematologic/Lymphatic: Negative.    Psychiatric/Behavioral: Negative.       Objective:      Physical Exam   Constitutional: He is oriented to person, place, and time. He is cooperative.  Non-toxic appearance. He does not appear ill. No distress.   HENT:   Head: Normocephalic and atraumatic.   Ears: "   Right Ear: Hearing, external ear and ear canal normal. No cerumen not present. Tympanic membrane is injected. Tympanic membrane is not scarred, not perforated, not erythematous, not retracted and not bulging. impacted cerumen  Left Ear: Hearing, external ear and ear canal normal. No cerumen not present. Tympanic membrane is retracted. Tympanic membrane is not injected, not scarred, not perforated, not erythematous and not bulging. impacted cerumen  Nose: Mucosal edema, rhinorrhea and congestion present. No purulent discharge. Right sinus exhibits no maxillary sinus tenderness and no frontal sinus tenderness. Left sinus exhibits no maxillary sinus tenderness and no frontal sinus tenderness.   Mouth/Throat: Mucous membranes are normal. Mucous membranes are moist. Posterior oropharyngeal erythema and cobblestoning present. No oropharyngeal exudate, posterior oropharyngeal edema or tonsillar abscesses. Tonsils are 0 on the right. Tonsils are 0 on the left. No tonsillar exudate.   Eyes: Conjunctivae are normal. Pupils are equal, round, and reactive to light. Right eye exhibits no discharge. Left eye exhibits no discharge. No scleral icterus. Extraocular movement intact   Neck: Neck supple.   Cardiovascular: Normal rate, regular rhythm and normal heart sounds.   Pulmonary/Chest: Effort normal and breath sounds normal. No stridor. No respiratory distress. He has no wheezes. He has no rhonchi. He has no rales. He exhibits no tenderness.   Abdominal: Normal appearance.   Musculoskeletal: Normal range of motion.         General: Normal range of motion.   Neurological: no focal deficit. He is alert and oriented to person, place, and time.   Skin: Skin is warm, dry and not diaphoretic.   Psychiatric: His behavior is normal. Mood, judgment and thought content normal.   Nursing note and vitals reviewed.      Results for orders placed or performed in visit on 01/15/23   POCT Strep A, Molecular   Result Value Ref Range     Molecular Strep A, POC Negative Negative     Acceptable Yes        Assessment:       1. Otitis of left ear    2. Sore throat    3. Otalgia of left ear    4. Nasal congestion          Plan:         Otitis of left ear  -     amoxicillin-clavulanate 875-125mg (AUGMENTIN) 875-125 mg per tablet; Take 1 tablet by mouth every 12 (twelve) hours. for 10 days  Dispense: 20 tablet; Refill: 0    Sore throat  -     POCT Strep A, Molecular    Otalgia of left ear    Nasal congestion  -     ipratropium (ATROVENT) 42 mcg (0.06 %) nasal spray; 2 sprays by Each Nostril route 4 (four) times daily.  Dispense: 15 mL; Refill: 0                 Patient Instructions   Rest  Hydration/Increase fluids  Steam/hot showers  Humidifier  Continue Claritin D OTC as directed  Initiate Mucinex OTC as directed  Tylenol OTC as directed  May alternate with Ibuprofen OTC as directed

## 2023-01-15 NOTE — PATIENT INSTRUCTIONS
Rest  Hydration/Increase fluids  Steam/hot showers  Humidifier  Continue Claritin D OTC as directed  Initiate Mucinex OTC as directed  Tylenol OTC as directed  May alternate with Ibuprofen OTC as directed

## 2023-01-17 ENCOUNTER — OFFICE VISIT (OUTPATIENT)
Dept: OTOLARYNGOLOGY | Facility: CLINIC | Age: 58
End: 2023-01-17
Payer: COMMERCIAL

## 2023-01-17 VITALS — TEMPERATURE: 98 F | WEIGHT: 183.88 LBS | BODY MASS INDEX: 30.6 KG/M2

## 2023-01-17 DIAGNOSIS — J30.89 NON-SEASONAL ALLERGIC RHINITIS, UNSPECIFIED TRIGGER: ICD-10-CM

## 2023-01-17 DIAGNOSIS — J01.90 ACUTE NON-RECURRENT SINUSITIS, UNSPECIFIED LOCATION: Primary | ICD-10-CM

## 2023-01-17 PROCEDURE — 99999 PR PBB SHADOW E&M-EST. PATIENT-LVL III: ICD-10-PCS | Mod: PBBFAC,,, | Performed by: OTOLARYNGOLOGY

## 2023-01-17 PROCEDURE — 99214 PR OFFICE/OUTPT VISIT, EST, LEVL IV, 30-39 MIN: ICD-10-PCS | Mod: S$GLB,,, | Performed by: OTOLARYNGOLOGY

## 2023-01-17 PROCEDURE — 99214 OFFICE O/P EST MOD 30 MIN: CPT | Mod: S$GLB,,, | Performed by: OTOLARYNGOLOGY

## 2023-01-17 PROCEDURE — 99999 PR PBB SHADOW E&M-EST. PATIENT-LVL III: CPT | Mod: PBBFAC,,, | Performed by: OTOLARYNGOLOGY

## 2023-01-17 RX ORDER — HYDROCODONE POLISTIREX AND CHLORPHENIRAMINE POLISTIREX 10; 8 MG/5ML; MG/5ML
5 SUSPENSION, EXTENDED RELEASE ORAL NIGHTLY PRN
Qty: 115 ML | Refills: 0 | Status: SHIPPED | OUTPATIENT
Start: 2023-01-17 | End: 2023-01-24

## 2023-01-17 RX ORDER — METHYLPREDNISOLONE 4 MG/1
TABLET ORAL
Qty: 1 EACH | Refills: 0 | Status: SHIPPED | OUTPATIENT
Start: 2023-01-17 | End: 2023-02-02

## 2023-01-17 RX ORDER — FLUTICASONE PROPIONATE 50 MCG
2 SPRAY, SUSPENSION (ML) NASAL DAILY
Qty: 16 G | Refills: 3 | Status: SHIPPED | OUTPATIENT
Start: 2023-01-17 | End: 2023-03-20

## 2023-01-17 NOTE — PROGRESS NOTES
Referring Provider:    Aaareferral Self  No address on file  Subjective:   Patient: Vinny Stover [id number], :1965   Visit date:2023 10:57 AM    Chief Complaint:  Cough (Pt states he has had a terrible cough for 9 days. States cough getting worse. C/o sinus pressure and sore throat. States throat is so swollen it is a little harder to breathe.States it is hard to talk and his chest hurts and it is tight)    HPI:    Prior notes reviewed by myself.  Clinical documentation obtained by nursing staff reviewed.     57-year-old gentleman presents for evaluation of sinusitis symptoms.  He developed upper respiratory symptoms after recent vacation including cough, postnasal drip, congestion, rhinorrhea.  He has been seen by his primary care physician and started on Augmentin 2 days ago as well as Tessalon Perles and promethazine/dextromethorphan cough syrup.  He continues to have severe coughing at night which is keeping him awake as well as symptomatic congestion.  No recent fever/chills.  He did have a negative COVID and strep test.      Objective:     Physical Exam:  Vitals:  Temp 98.2 °F (36.8 °C) (Temporal)   Wt 83.4 kg (183 lb 13.8 oz)   BMI 30.60 kg/m²   General appearance:  Well developed, well nourished    Ears:  Otoscopy of external auditory canals and tympanic membranes was normal, clinical speech reception thresholds grossly intact, no mass/lesion of auricle.    Nose:  No masses/lesions of external nose, congested nasal mucosa, septum, and turbinates were within normal limits. Yellow rhinorrhea    Mouth:  No mass/lesion of lips, teeth, gums, hard/soft palate, tongue, tonsils, or oropharynx.    Neck & Lymphatics:  No cervical lymphadenopathy, no neck mass/crepitus/ asymmetry, trachea is midline, no thyroid enlargement/tenderness/mass.        [x]  Data Reviewed:    Lab Results   Component Value Date    WBC 7.0 2022    HGB 14.4 2022    HCT 42.7 2022    MCV 93 2022     "EOSINOPHIL 4 06/20/2022             Assessment & Plan:   Acute non-recurrent sinusitis, unspecified location  -     methylPREDNISolone (MEDROL DOSEPACK) 4 mg tablet; use as directed  Dispense: 1 each; Refill: 0  -     hydrocodone-chlorpheniramine (TUSSIONEX) 10-8 mg/5 mL suspension; Take 5 mLs by mouth nightly as needed for Cough.  Dispense: 115 mL; Refill: 0    Non-seasonal allergic rhinitis, unspecified trigger  -     fluticasone propionate (FLONASE) 50 mcg/actuation nasal spray; 2 sprays (100 mcg total) by Each Nostril route once daily.  Dispense: 16 g; Refill: 3      I advised him to discontinue his ipratropium and start Flonase.  We reviewed my sinus protocol which he will also start today.  We will add a Medrol Dosepak to his current regimen as well as changing his nighttime cough medicine to Tussionex.  He can follow up p.r.n. if he does not adequately improve.    Dr. Freeman's Sinus Protocol    Sinusitis is caused by inflammation creating blockage in the natural drainage pathways of the sinuses.  This "Sinus Protocol" is designed to open, flush out and decrease the inflammation within those pathways.      Day 1-3 (Perform 2x per day)     Afrin (pump spray mist OTC) 2 sprays in each nostril   Joe Med Sinus Wash - Make sure you use distilled water!  Fluticasone nasal spray 2 sprays in each nostril      Days 4 - follow up visit (perform 1x per day)    Joe Med Sinus wash  Fluticasone nasal spray 2 sprays in each nostril        *TAKE ALL OTHER MEDICATIONS AS DIRECTED BY DR FREEMAN  *MAKE SURE YOU STOP USING AFRIN AFTER THE 3RD DAY AS THERE IS A RISK OF BECOMING DEPENDENT ON THAT MEDICATION      "

## 2023-02-01 ENCOUNTER — HOSPITAL ENCOUNTER (EMERGENCY)
Facility: HOSPITAL | Age: 58
Discharge: LEFT BEFORE DISCHARGE | End: 2023-02-01
Payer: COMMERCIAL

## 2023-02-01 VITALS
RESPIRATION RATE: 22 BRPM | OXYGEN SATURATION: 95 % | SYSTOLIC BLOOD PRESSURE: 130 MMHG | HEART RATE: 107 BPM | DIASTOLIC BLOOD PRESSURE: 61 MMHG | TEMPERATURE: 100 F | BODY MASS INDEX: 30.74 KG/M2 | WEIGHT: 184.75 LBS

## 2023-02-01 DIAGNOSIS — A41.9 SEPSIS: ICD-10-CM

## 2023-02-01 DIAGNOSIS — U07.1 COVID-19 VIRUS DETECTED: ICD-10-CM

## 2023-02-01 LAB
ALBUMIN SERPL BCP-MCNC: 3.8 G/DL (ref 3.5–5.2)
ALP SERPL-CCNC: 76 U/L (ref 55–135)
ALT SERPL W/O P-5'-P-CCNC: 32 U/L (ref 10–44)
ANION GAP SERPL CALC-SCNC: 9 MMOL/L (ref 8–16)
AST SERPL-CCNC: 21 U/L (ref 10–40)
BASOPHILS # BLD AUTO: 0.02 K/UL (ref 0–0.2)
BASOPHILS NFR BLD: 0.2 % (ref 0–1.9)
BILIRUB SERPL-MCNC: 0.6 MG/DL (ref 0.1–1)
BILIRUB UR QL STRIP: NEGATIVE
BUN SERPL-MCNC: 10 MG/DL (ref 6–20)
CALCIUM SERPL-MCNC: 8.7 MG/DL (ref 8.7–10.5)
CHLORIDE SERPL-SCNC: 102 MMOL/L (ref 95–110)
CLARITY UR: CLEAR
CO2 SERPL-SCNC: 24 MMOL/L (ref 23–29)
COLOR UR: YELLOW
CREAT SERPL-MCNC: 1.1 MG/DL (ref 0.5–1.4)
D DIMER PPP IA.FEU-MCNC: 0.48 MG/L FEU
DIFFERENTIAL METHOD: ABNORMAL
EOSINOPHIL # BLD AUTO: 0.1 K/UL (ref 0–0.5)
EOSINOPHIL NFR BLD: 0.9 % (ref 0–8)
ERYTHROCYTE [DISTWIDTH] IN BLOOD BY AUTOMATED COUNT: 12.1 % (ref 11.5–14.5)
EST. GFR  (NO RACE VARIABLE): >60 ML/MIN/1.73 M^2
GLUCOSE SERPL-MCNC: 120 MG/DL (ref 70–110)
GLUCOSE UR QL STRIP: NEGATIVE
HCT VFR BLD AUTO: 40.7 % (ref 40–54)
HGB BLD-MCNC: 14 G/DL (ref 14–18)
HGB UR QL STRIP: NEGATIVE
IMM GRANULOCYTES # BLD AUTO: 0.04 K/UL (ref 0–0.04)
IMM GRANULOCYTES NFR BLD AUTO: 0.4 % (ref 0–0.5)
INFLUENZA A, MOLECULAR: NEGATIVE
INFLUENZA B, MOLECULAR: NEGATIVE
KETONES UR QL STRIP: NEGATIVE
LACTATE SERPL-SCNC: 0.9 MMOL/L (ref 0.5–2.2)
LACTATE SERPL-SCNC: 1 MMOL/L (ref 0.5–2.2)
LEUKOCYTE ESTERASE UR QL STRIP: NEGATIVE
LYMPHOCYTES # BLD AUTO: 0.8 K/UL (ref 1–4.8)
LYMPHOCYTES NFR BLD: 8.3 % (ref 18–48)
MCH RBC QN AUTO: 31.1 PG (ref 27–31)
MCHC RBC AUTO-ENTMCNC: 34.4 G/DL (ref 32–36)
MCV RBC AUTO: 90 FL (ref 82–98)
MONOCYTES # BLD AUTO: 1.2 K/UL (ref 0.3–1)
MONOCYTES NFR BLD: 12.1 % (ref 4–15)
NEUTROPHILS # BLD AUTO: 7.4 K/UL (ref 1.8–7.7)
NEUTROPHILS NFR BLD: 78.1 % (ref 38–73)
NITRITE UR QL STRIP: NEGATIVE
NRBC BLD-RTO: 0 /100 WBC
PH UR STRIP: 7 [PH] (ref 5–8)
PLATELET # BLD AUTO: 200 K/UL (ref 150–450)
PMV BLD AUTO: 10.6 FL (ref 9.2–12.9)
POTASSIUM SERPL-SCNC: 3.7 MMOL/L (ref 3.5–5.1)
PROT SERPL-MCNC: 7.1 G/DL (ref 6–8.4)
PROT UR QL STRIP: NEGATIVE
RBC # BLD AUTO: 4.5 M/UL (ref 4.6–6.2)
SARS-COV-2 RDRP RESP QL NAA+PROBE: POSITIVE
SODIUM SERPL-SCNC: 135 MMOL/L (ref 136–145)
SP GR UR STRIP: 1.03 (ref 1–1.03)
SPECIMEN SOURCE: NORMAL
TROPONIN I SERPL DL<=0.01 NG/ML-MCNC: <0.006 NG/ML (ref 0–0.03)
URN SPEC COLLECT METH UR: NORMAL
UROBILINOGEN UR STRIP-ACNC: NEGATIVE EU/DL
WBC # BLD AUTO: 9.53 K/UL (ref 3.9–12.7)

## 2023-02-01 PROCEDURE — 84484 ASSAY OF TROPONIN QUANT: CPT | Performed by: NURSE PRACTITIONER

## 2023-02-01 PROCEDURE — 85025 COMPLETE CBC W/AUTO DIFF WBC: CPT | Performed by: NURSE PRACTITIONER

## 2023-02-01 PROCEDURE — 93005 ELECTROCARDIOGRAM TRACING: CPT

## 2023-02-01 PROCEDURE — 87502 INFLUENZA DNA AMP PROBE: CPT | Performed by: NURSE PRACTITIONER

## 2023-02-01 PROCEDURE — 99285 EMERGENCY DEPT VISIT HI MDM: CPT | Mod: 25

## 2023-02-01 PROCEDURE — 93010 EKG 12-LEAD: ICD-10-PCS | Mod: ,,, | Performed by: STUDENT IN AN ORGANIZED HEALTH CARE EDUCATION/TRAINING PROGRAM

## 2023-02-01 PROCEDURE — U0002 COVID-19 LAB TEST NON-CDC: HCPCS | Performed by: NURSE PRACTITIONER

## 2023-02-01 PROCEDURE — 80053 COMPREHEN METABOLIC PANEL: CPT | Performed by: NURSE PRACTITIONER

## 2023-02-01 PROCEDURE — 85379 FIBRIN DEGRADATION QUANT: CPT | Performed by: NURSE PRACTITIONER

## 2023-02-01 PROCEDURE — 81003 URINALYSIS AUTO W/O SCOPE: CPT | Performed by: NURSE PRACTITIONER

## 2023-02-01 PROCEDURE — 87040 BLOOD CULTURE FOR BACTERIA: CPT | Performed by: NURSE PRACTITIONER

## 2023-02-01 PROCEDURE — 93010 ELECTROCARDIOGRAM REPORT: CPT | Mod: ,,, | Performed by: STUDENT IN AN ORGANIZED HEALTH CARE EDUCATION/TRAINING PROGRAM

## 2023-02-01 PROCEDURE — 83605 ASSAY OF LACTIC ACID: CPT | Mod: 91 | Performed by: NURSE PRACTITIONER

## 2023-02-01 NOTE — FIRST PROVIDER EVALUATION
Medical screening examination initiated.  I have conducted a focused provider triage encounter, findings are as follows:    Brief history of present illness:  Patient reports ongoing fever times 21 days.  Patient reports recent travel out of the country.  Patient reports he has been on antibiotics and cough medicine without any relief.  Patient reports shortness of breath today    Vitals:    02/01/23 1740   BP: 130/61   BP Location: Right arm   Patient Position: Sitting   Pulse: 107   Resp: (!) 22   Temp: 100.3 °F (37.9 °C)   TempSrc: Oral   SpO2: 95%   Weight: 83.8 kg (184 lb 11.9 oz)       Pertinent physical exam:  Tachycardic    Brief workup plan:  Labs, EKG, imaging    Preliminary workup initiated; this workup will be continued and followed by the physician or advanced practice provider that is assigned to the patient when roomed.

## 2023-02-02 ENCOUNTER — TELEPHONE (OUTPATIENT)
Dept: INTERNAL MEDICINE | Facility: CLINIC | Age: 58
End: 2023-02-02

## 2023-02-02 ENCOUNTER — PATIENT MESSAGE (OUTPATIENT)
Dept: INTERNAL MEDICINE | Facility: CLINIC | Age: 58
End: 2023-02-02

## 2023-02-02 ENCOUNTER — OFFICE VISIT (OUTPATIENT)
Dept: INTERNAL MEDICINE | Facility: CLINIC | Age: 58
End: 2023-02-02
Payer: COMMERCIAL

## 2023-02-02 VITALS
BODY MASS INDEX: 30.64 KG/M2 | HEIGHT: 65 IN | OXYGEN SATURATION: 97 % | TEMPERATURE: 101 F | SYSTOLIC BLOOD PRESSURE: 120 MMHG | DIASTOLIC BLOOD PRESSURE: 64 MMHG | HEART RATE: 102 BPM | WEIGHT: 183.88 LBS

## 2023-02-02 DIAGNOSIS — U07.1 COVID-19 VIRUS INFECTION: Primary | ICD-10-CM

## 2023-02-02 DIAGNOSIS — R05.9 COUGH, UNSPECIFIED TYPE: ICD-10-CM

## 2023-02-02 PROCEDURE — 99214 OFFICE O/P EST MOD 30 MIN: CPT | Mod: S$GLB,,, | Performed by: FAMILY MEDICINE

## 2023-02-02 PROCEDURE — 99999 PR PBB SHADOW E&M-EST. PATIENT-LVL IV: ICD-10-PCS | Mod: PBBFAC,,, | Performed by: FAMILY MEDICINE

## 2023-02-02 PROCEDURE — 99999 PR PBB SHADOW E&M-EST. PATIENT-LVL IV: CPT | Mod: PBBFAC,,, | Performed by: FAMILY MEDICINE

## 2023-02-02 PROCEDURE — 99214 PR OFFICE/OUTPT VISIT, EST, LEVL IV, 30-39 MIN: ICD-10-PCS | Mod: S$GLB,,, | Performed by: FAMILY MEDICINE

## 2023-02-02 RX ORDER — BENZONATATE 200 MG/1
200 CAPSULE ORAL 3 TIMES DAILY PRN
Qty: 30 CAPSULE | Refills: 1 | Status: SHIPPED | OUTPATIENT
Start: 2023-02-02 | End: 2023-02-12

## 2023-02-02 NOTE — LETTER
02/02/2023        BRITTNY COLIN GIOVANNY   [address] [place] [address]        Brittny Negrete.    Your test was POSITIVE for COVID-19 (coronavirus). This means that you have COVID-19.     The COVID-19 risk score predicts a person's risk for severe illness from COVID-19 infection. Your COVID-19 risk score is 2 out of a possible total score of 15. Based on your COVID-19 risk score, the date of onset of your symptoms, and consistent with National Institutes of Health Treatment Guidelines, [facility] is recommending Paxlovid as the first line treatment option for patients with mild to moderate COVID-19 AND who are at risk for progression to severe disease.    Paxlovid when used in non-hospitalized adults with mild to moderate COVID-19 within 5 days of symptom onset reduced the risk of hospitalization or death through Day 28 by 89% compared to placebo. You can learn more about Paxlovid at https://www.fda.gov/media/805762/download    I sent your prescription for Paxlovid to your pharmacy ([facility]). You need to start the Paxlovid ASAP. If your pharmacy doesn't have the medicine in stock, have your pharmacist send the prescription to another pharmacy. To find other pharmacies that have Paxlovid in stock, visit this link [email or web address]-[email or web address]/data    If you don't already have a thermometer and pulse oximeter at home, purchase these items at your pharmacy.    IMPORTANT: Review all your current medications with your pharmacist before starting Paxlovid.    IMPORTANT: Paxlovid can have dangerous drug interactions if taken together with alfuzosin (Uroxatral).   You need to stop taking alfuzosin (Uroxatral) at least 12 hours before your first dose of Paxlovid. Do not re-start alfuzosin (Uroxatral) until three days after you have finished taking Paxlovid.        Please take the time now to Sign up for [facility]s free COVID-19 Self-Care and Symptom Monitoring  Program.   By completing a quick form on [facility], you will be automatically enrolled into our symptom monitoring program. If your symptoms worsen, our care team will be there to guide you on the next steps and treatment options.   For patients and community members who already have a [facility] account, log in to [facility] and search for the COVID-19 Self-Care and Symptom Monitoring Program.   Once in your [facility] account, click Menu, then COVID-19, scroll down to find Start a new COVID-19 self-assessment.    In the meantime, supportive care is helpful for COVID-19 infection symptoms. This includes rest, hydration, and over-the-counter (OTC) medicines as needed for treatment of COVID-related symptoms:   OTC pain medicines like acetaminophen (Tylenol) or ibuprofen (Advil) as needed for aches and pains and headache   OTC cough and cold medicines like Robitussin for treatment of cough and head congestion   OTC anti-diarrheal medicines like Immodium-AD for treatment of diarrhea  Always read the instructions on the medicine packaging and take as directed.    Both [facility] and the CDC have online resources to help you take care of yourself. To learn more, visit the following links.  [email or web address]  https://www.cdc.gov/coronavirus/2019-ncov/if-you-are-sick/steps-when-sick.html    If you have COVID-19, when you safely can be around others is different for different situations. The CDC has a simple to use Isolation and Exposure Calculator to help you determine when you can safely end home isolation.  https://www.cdc.gov/coronavirus/2019-ncov/your-health/isolation.html    You do not need to get re-tested for COVID-19 after you finish isolation.    Once you are infected with COVID-19 (coronavirus), your COVID-19 test result (especially PCR test results) can remain positive for up to two months after it is safe for your to end your isolation period.   Because of this, it is NOT recommended  "that people repeat their COVID-19 test to "test for cure" after they test positive.   A positive repeat test (especially PCR test results) within two months after you were diagnosed with COVID-19 does NOT mean that you are still infected or that you are contagious.    Thanks for trusting [facility] with your healthcare needs.    I hope you feel better soon.    Sincerely,    DEBRA Garcia MD  "

## 2023-02-02 NOTE — ED NOTES
Eloped status noted - chart and provider note reviewed. Spoke with patient who is aware of results and states had appointment with PCP today.

## 2023-02-02 NOTE — TELEPHONE ENCOUNTER
TO MY TEAM: Please contact Vinny to relay message (below) or at least verify that he read the same Patient Portal message I sent on [facility].  TEAM TIP: If a patient is willing to monitor [facility] for my answer to their Patient Call message, I'll respond to them directly if I can and relieve you of the trouble of calling them back. For me to do this, please note that they agreed to this.   --------------------------------------------------------------------------------   Vinny Negrete.    I apologize for failing to send your cough medicine this morning.    I have since sent the prescription to [facility].    I hope you feel better soon.    All the best,    GAL Garcia MD    Medications Ordered This Encounter   Medications    benzonatate (TESSALON) 200 MG capsule     Sig: Take 1 capsule (200 mg total) by mouth 3 (three) times daily as needed for Cough.     Dispense:  30 capsule     Refill:  1

## 2023-02-02 NOTE — TELEPHONE ENCOUNTER
----- Message from Britney Kahn sent at 2/2/2023 12:59 PM CST -----  Contact: self/[phone]  Pt is calling in regards to a cough medicine that was supposed to be prescribed to him, he states pharmacy never received prescription and he does not know the name of it. Please give him a call back at [phone]. Thank you s/g

## 2023-02-02 NOTE — PROGRESS NOTES
OFFICE VISIT 2/2/23 10:00 AM [facility]    CHIEF COMPLAINT: Follow-up    HISTORY    He comes in with newly diagnosed COVID-19. (This is a new problem - acute illness with systemic symptoms.)    He reports that amount 3 to 4 weeks ago he developed what sounds like head and chest cold symptoms. He just finished a 10 day course of Augmentin plus a Medrol dose pack. He was getting better, and then yesterday he developed fever, worsening, cough, and chest, congestion, and generalized aches. He went to the emergency department and tested positive for Covid. Chest x-ray did not show focal pneumonia.     THIS PATIENT'S COVID RISK SCORE = 2    Lab Results   Component Value Date    [id number] Positive (A) 02/01/2023     We discussed risks and benefits of treatment options. Additional education/instructions were reviewed and shared with Vinny. Refer to After Visit Summary, Patient Portal message, and printed instructions/letter.    ASSESSMENT & PLAN  1. COVID-19 virus infection  -     nirmatrelvir-ritonavir 300 mg (150 mg x 2)-100 mg copackaged tablets (EUA); Take 3 tablets by mouth 2 (two) times daily for 5 days. Each dose contains 2 nirmatrelvir (pink tablets) and 1 ritonavir (white tablet). Take all 3 tablets together  Dispense: 30 tablet; Refill: 0    Unless noted herein, any chronic conditions are represented as and appear compensated/controlled and stable, and no other significant complaints or concerns were reported.    Follow up in about 3 weeks (around 2/23/2023), or if symptoms worsen or fail to improve, for virtual visit re-evaluation.   Future Appointments   Date Time Provider Department Center   6/13/2023  9:35 AM LABORATORY, [facility] LAB [facility]   6/26/2023  8:30 AM Tamara Brasher MD [facility] UROLOGY [facility]     PRESCRIPTION DRUG MANAGEMENT  Outpatient Medications Prior to Visit   Medication Sig Dispense Refill    fluticasone propionate (FLONASE) 50 mcg/actuation nasal spray 2 sprays (100 mcg total) by Each  Nostril route once daily. 16 g 3    promethazine-dextromethorphan (PROMETHAZINE-DM) 6.25-15 mg/5 mL Syrp Take 5 mLs by mouth nightly as needed (Cough). 120 mL 0    alfuzosin (UROXATRAL) 10 mg Tb24 Take 1 tablet (10 mg total) by mouth daily with breakfast. (Patient not taking: Reported on 1/17/2023) 30 tablet 11    ipratropium (ATROVENT) 42 mcg (0.06 %) nasal spray 2 sprays by Each Nostril route 4 (four) times daily. (Patient not taking: Reported on 2/2/2023) 15 mL 0    (Magic mouthwash) 1:1:1 diphenhydrAMINE(Benadryl) 12.5mg/5ml liq, aluminum & magnesium hydroxide-simethicone (Maalox), LIDOcaine viscous 2% Swish and spit 15 mLs every 4 (four) hours as needed (throat pain). (Patient not taking: Reported on 2/2/2023) 240 mL 0    meloxicam (MOBIC) 15 MG tablet Take 1 tablet (15 mg total) by mouth once daily. (Patient not taking: Reported on 1/17/2023) 30 tablet 3    methylPREDNISolone (MEDROL DOSEPACK) 4 mg tablet use as directed (Patient not taking: Reported on 2/2/2023) 1 each 0     No facility-administered medications prior to visit.     Medications Discontinued During This Encounter   Medication Reason    methylPREDNISolone (MEDROL DOSEPACK) 4 mg tablet Patient no longer taking    meloxicam (MOBIC) 15 MG tablet Patient no longer taking    (Magic mouthwash) 1:1:1 diphenhydrAMINE(Benadryl) 12.5mg/5ml liq, aluminum & magnesium hydroxide-simethicone (Maalox), LIDOcaine viscous 2% Patient no longer taking     Medications Ordered This Encounter   Medications    nirmatrelvir-ritonavir 300 mg (150 mg x 2)-100 mg copackaged tablets (EUA)     Sig: Take 3 tablets by mouth 2 (two) times daily for 5 days. Each dose contains 2 nirmatrelvir (pink tablets) and 1 ritonavir (white tablet). Take all 3 tablets together     Dispense:  30 tablet     Refill:  0     PHYSICAL EXAM  Vitals:    02/02/23 1014   BP: 120/64   BP Location: Right arm   Patient Position: Sitting   BP Method: Medium (Manual)   Pulse: 102   Temp: (!) 101.2  "°F (38.4 °C)   TempSrc: Tympanic   SpO2: 97%   Weight: 83.4 kg (183 lb 13.8 oz)   Height: 5' 5" (1.651 m)   Physical Exam  Vitals reviewed.   Constitutional:       General: He is not in acute distress.     Appearance: Normal appearance. He is not ill-appearing or diaphoretic.   Cardiovascular:      Rate and Rhythm: Normal rate and regular rhythm.      Heart sounds: Normal heart sounds.   Pulmonary:      Effort: Pulmonary effort is normal. No respiratory distress.      Breath sounds: Normal breath sounds.   Skin:     General: Skin is warm and dry.      Capillary Refill: Capillary refill takes less than 2 seconds.   Neurological:      Mental Status: He is alert and oriented to person, place, and time. Mental status is at baseline.   Psychiatric:         Mood and Affect: Mood normal.         Behavior: Behavior normal.         Judgment: Judgment normal.     PAST MEDICAL HISTORY  Vinny has a past medical history of Aphthous stomatitis, Arthritis, Colon polyp, Gastroesophageal reflux disease without esophagitis, and Obstructive sleep apnea syndrome.    SURGICAL HISTORY  Vinny has a past surgical history that includes Nose surgery and Colonoscopy (2015).    FAMILY HISTORY  Vinny family history includes Colon cancer in his paternal grandfather; Drug abuse in his mother; No Known Problems in his father.    ALLERGIES  Review of patient's allergies indicates:  No Known Allergies  SOCIAL HISTORY  Vinny  reports that he has never smoked. He has never been exposed to tobacco smoke. He has never used smokeless tobacco. He reports that he does not drink alcohol and does not use drugs.     MDM  Number of Diagnoses or Management Options  COVID-19 virus infection  Diagnosis management comments: Acute COVID-19 infection is a new problem - acute illness with systemic symptoms.       Amount and/or Complexity of Data Reviewed  Clinical lab tests: reviewed  Tests in the radiology section of CPT®: reviewed  Independent visualization " "of images, tracings, or specimens: yes    Risk of Complications, Morbidity, and/or Mortality  Presenting problems: high  Diagnostic procedures: minimal  Management options: moderate       Documentation entered by me for this encounter may have been done in part using speech-recognition technology. Although I have made an effort to ensure accuracy, "sound like" errors may exist and should be interpreted in context.   "

## 2023-02-02 NOTE — PATIENT INSTRUCTIONS
02/02/2023        BRITTNY COLIN GIOVANNY   [address] [facility] [address]        Brittny Negrete.    Your test was POSITIVE for COVID-19 (coronavirus). This means that you have COVID-19.     The COVID-19 risk score predicts a person's risk for severe illness from COVID-19 infection. Your COVID-19 risk score is 2 out of a possible total score of 15. Based on your COVID-19 risk score, the date of onset of your symptoms, and consistent with National Institutes of Health Treatment Guidelines, [facility] is recommending Paxlovid as the first line treatment option for patients with mild to moderate COVID-19 AND who are at risk for progression to severe disease.    Paxlovid when used in non-hospitalized adults with mild to moderate COVID-19 within 5 days of symptom onset reduced the risk of hospitalization or death through Day 28 by 89% compared to placebo. You can learn more about Paxlovid at https://www.fda.gov/media/419345/download    I sent your prescription for Paxlovid to your pharmacy ([facility]). You need to start the Paxlovid ASAP. If your pharmacy doesn't have the medicine in stock, have your pharmacist send the prescription to another pharmacy. To find other pharmacies that have Paxlovid in stock, visit this link [email or web address]    If you don't already have a thermometer and pulse oximeter at home, purchase these items at your pharmacy.    IMPORTANT: Review all your current medications with your pharmacist before starting Paxlovid.    IMPORTANT: Paxlovid can have dangerous drug interactions if taken together with alfuzosin (Uroxatral).  You need to stop taking alfuzosin (Uroxatral) at least 12 hours before your first dose of Paxlovid. Do not re-start alfuzosin (Uroxatral) until three days after you have finished taking Paxlovid.        Please take the time now to Sign up for [facility]s free COVID-19 Self-Care and Symptom Monitoring  Program.  By completing a quick form on [facility], you will be automatically enrolled into our symptom monitoring program. If your symptoms worsen, our care team will be there to guide you on the next steps and treatment options.  For patients and community members who already have a [facility] account, log in to [facility] and search for the COVID-19 Self-Care and Symptom Monitoring Program.  Once in your [facility] account, click Menu, then COVID-19, scroll down to find Start a new COVID-19 self-assessment.    In the meantime, supportive care is helpful for COVID-19 infection symptoms. This includes rest, hydration, and over-the-counter (OTC) medicines as needed for treatment of COVID-related symptoms:  OTC pain medicines like acetaminophen (Tylenol) or ibuprofen (Advil) as needed for aches and pains and headache  OTC cough and cold medicines like Robitussin for treatment of cough and head congestion  OTC anti-diarrheal medicines like Immodium-AD for treatment of diarrhea  Always read the instructions on the medicine packaging and take as directed.    Both [facility] and the CDC have online resources to help you take care of yourself. To learn more, visit the following links.  [email or web address]  https://www.cdc.gov/coronavirus/2019-ncov/if-you-are-sick/steps-when-sick.html    If you have COVID-19, when you safely can be around others is different for different situations. The CDC has a simple to use Isolation and Exposure Calculator to help you determine when you can safely end home isolation.  https://www.cdc.gov/coronavirus/2019-ncov/your-health/isolation.html    You do not need to get re-tested for COVID-19 after you finish isolation.   Once you are infected with COVID-19 (coronavirus), your COVID-19 test result (especially PCR test results) can remain positive for up to two months after it is safe for your to end your isolation period.  Because of this, it is NOT recommended that people repeat  "their COVID-19 test to "test for cure" after they test positive.  A positive repeat test (especially PCR test results) within two months after you were diagnosed with COVID-19 does NOT mean that you are still infected or that you are contagious.    Thanks for trusting [facility] with your healthcare needs.    I hope you feel better soon.    Sincerely,    DEBRA Garcia MD  "

## 2023-02-04 ENCOUNTER — NURSE TRIAGE (OUTPATIENT)
Dept: ADMINISTRATIVE | Facility: CLINIC | Age: 58
End: 2023-02-04
Payer: COMMERCIAL

## 2023-02-04 NOTE — TELEPHONE ENCOUNTER
Pt. to [facility] text message. Spoke with pt who reports that he tested positive for COVID. Reports fever broke last night. But states cough is getting worse. Pt advised to call PCP within 24 hours. ED/UC also offered. O[facility] offered as well.  Reason for Disposition   [1] HIGH RISK for severe COVID complications (e.g., weak immune system, age > 64 years, obesity with BMI 30 or higher, pregnant, chronic lung disease or other chronic medical condition) AND [2] COVID symptoms (e.g., cough, fever)  (Exceptions: Already seen by PCP and no new or worsening symptoms.)    Additional Information   Negative: SEVERE difficulty breathing (e.g., struggling for each breath, speaks in single words)   Negative: Difficult to awaken or acting confused (e.g., disoriented, slurred speech)   Negative: Bluish (or gray) lips or face now   Negative: Shock suspected (e.g., cold/pale/clammy skin, too weak to stand, low BP, rapid pulse)   Negative: Sounds like a life-threatening emergency to the triager   Negative: SEVERE or constant chest pain or pressure  (Exception: Mild central chest pain, present only when coughing.)   Negative: MODERATE difficulty breathing (e.g., speaks in phrases, SOB even at rest, pulse 100-120)   Negative: [1] Headache AND [2] stiff neck (can't touch chin to chest)   Negative: Oxygen level (e.g., pulse oximetry) 90 percent or lower   Negative: Chest pain or pressure  (Exception: MILD central chest pain, present only when coughing)   Negative: Patient sounds very sick or weak to the triager   Negative: MILD difficulty breathing (e.g., minimal/no SOB at rest, SOB with walking, pulse <100)   Negative: Fever > 103 F (39.4 C)   Negative: [1] Fever > 101 F (38.3 C) AND [2] age > 60 years   Negative: [1] Fever > 100.0 F (37.8 C) AND [2] bedridden (e.g., CVA, chronic illness, recovering from surgery)   Negative: Oxygen level (e.g., pulse oximetry) 91 to 94 percent    Protocols used: Coronavirus (COVID-19) Diagnosed or  [id number]

## 2023-02-06 ENCOUNTER — PATIENT MESSAGE (OUTPATIENT)
Dept: ADMINISTRATIVE | Facility: OTHER | Age: 58
End: 2023-02-06
Payer: COMMERCIAL

## 2023-02-06 NOTE — TELEPHONE ENCOUNTER
He is being treated for COVID. Please call him to check on how he is doing.    If he hasn't already enrolled in [facility]s free COVID-19 Self-Care and Symptom Monitoring Program, instruct him to do so:  By completing a quick form on [facility], you will be automatically enrolled into our symptom monitoring program. If your symptoms worsen, our care team will be there to guide you on the next steps and treatment options.  For patients and community members who already have a [facility] account, log in to [facility] and search for the COVID-19 Self-Care and Symptom Monitoring Program.  Once in your [facility] account, click Menu, then COVID-19, scroll down to find Start a new COVID-19 self-assessment.    Refer him to the CDC Coronavirus Self-, which is a valuable tool created by the CDC to help you make decisions on when to seek testing and medical care.  https://www.cdc.gov/coronavirus/2019-ncov/symptoms-testing/coronavirus-self-.html#    Tell him I hope he feels better soon.       COMMENT: You can copy-and-paste the above instructions in a Patient Portal message to him so you don't have to spell out each URL address.    Thanks.

## 2023-02-07 ENCOUNTER — TELEPHONE (OUTPATIENT)
Dept: INTERNAL MEDICINE | Facility: CLINIC | Age: 58
End: 2023-02-07
Payer: COMMERCIAL

## 2023-02-07 LAB — BACTERIA BLD CULT: NORMAL

## 2023-02-07 NOTE — TELEPHONE ENCOUNTER
Contact patient regarding message, Courtesy call spoke with patient  he is feeling a little better, only worry patient stated was the cough is the only thing that is  bothering him at night. Overall patient is doing fine.  KJ

## 2023-03-10 ENCOUNTER — DOCUMENTATION ONLY (OUTPATIENT)
Dept: REHABILITATION | Facility: HOSPITAL | Age: 58
End: 2023-03-10
Payer: COMMERCIAL

## 2023-03-10 NOTE — PROGRESS NOTES
[facility]  Occupational therapy  Discharge Note    Name: Vinny Stover  [facility] Number: [id number]    Therapy Diagnosis:   Decreased range of motion   Decreased  strength of the right hand    Physician: Carlos Bundy MD      Physician Orders: Evaluation and treatment   Medical Diagnosis:   S62.654A (ICD-10-CM) - Closed nondisplaced fracture of middle phalanx of right ring finger, initial encounter            Surgical Procedure and Date: NA,   Evaluation Date: 12/19/2022    Date of Last visit: 1/9/2023.  Total Visits Received: 4    ASSESSMENT      Patient called to cancel appointment due to illness and did not return for therapy.    Discharge reason: Patient has not attended therapy since 1/9/2023.    Discharge FOTO Score: NA    Goals: GOALS:     Short Term Goals:  4 weeks  1/9/2023   Pain: Pt will demonstrate improved pain by reports of less than or equal to 1/10 worst pain on the verbal rating scale in order to progress toward maximal functional ability and improve QOL.  ongoing   Mobility: Patient will improve AROM to 50% of stated goals, listed in objective measures above, in order to progress towards independence with functional activities.  progressing   Strength: Patient will improve strength to 50% of stated goals, listed in objective measures above, in order to progress towards independence with functional activities.  progressing   Self Care: Patient will demonstrate improved self care skills and be able to button his shirt without difficulty,in order to progress towards independence with functional activities.  progressing   HEP: Patient will demonstrate independence with HEP in order to progress toward functional independence.  ongoing      Long Term Goals:  8 weeks  1/9/2023   Pain: Pt will demonstrate improved pain by reports of less than or equal to 0/10 worst pain on the verbal rating scale in order to progress toward maximal functional ability and improve QOL.        Function: Patient will demonstrate improved function as indicated by a functional limitation score of less than or equal to 28 out of 100 on FOTO.     Mobility: Patient will improve AROM to stated goals, listed in objective measures above, in order to return to maximal functional potential and improve quality of life.     Strength: Patient will improve strength to stated goals, listed in objective measures above, in order to improve functional independence and quality of life.     Patient will return to normal ADL's, IADL's, community involvement, recreational activities, and work-related activities with less than or equal to 0/10 pain and maximal function.             PLAN   This patient is discharged from Occupational Therapy      Aida Lewis OTR

## 2023-03-13 ENCOUNTER — OFFICE VISIT (OUTPATIENT)
Dept: INTERNAL MEDICINE | Facility: CLINIC | Age: 58
End: 2023-03-13
Payer: COMMERCIAL

## 2023-03-13 VITALS
WEIGHT: 177.81 LBS | RESPIRATION RATE: 18 BRPM | BODY MASS INDEX: 29.59 KG/M2 | OXYGEN SATURATION: 98 % | DIASTOLIC BLOOD PRESSURE: 88 MMHG | SYSTOLIC BLOOD PRESSURE: 124 MMHG | HEART RATE: 70 BPM | TEMPERATURE: 98 F

## 2023-03-13 DIAGNOSIS — R06.83 SNORING: ICD-10-CM

## 2023-03-13 DIAGNOSIS — F51.05 INSOMNIA DUE TO ANXIETY AND FEAR: Primary | ICD-10-CM

## 2023-03-13 DIAGNOSIS — F40.9 INSOMNIA DUE TO ANXIETY AND FEAR: Primary | ICD-10-CM

## 2023-03-13 PROCEDURE — 99213 OFFICE O/P EST LOW 20 MIN: CPT | Mod: S$GLB,,, | Performed by: PHYSICIAN ASSISTANT

## 2023-03-13 PROCEDURE — 99213 PR OFFICE/OUTPT VISIT, EST, LEVL III, 20-29 MIN: ICD-10-PCS | Mod: S$GLB,,, | Performed by: PHYSICIAN ASSISTANT

## 2023-03-13 PROCEDURE — 99999 PR PBB SHADOW E&M-EST. PATIENT-LVL IV: CPT | Mod: PBBFAC,,, | Performed by: PHYSICIAN ASSISTANT

## 2023-03-13 PROCEDURE — 99999 PR PBB SHADOW E&M-EST. PATIENT-LVL IV: ICD-10-PCS | Mod: PBBFAC,,, | Performed by: PHYSICIAN ASSISTANT

## 2023-03-13 RX ORDER — TRAZODONE HYDROCHLORIDE 50 MG/1
50 TABLET ORAL NIGHTLY
Qty: 30 TABLET | Refills: 11 | Status: SHIPPED | OUTPATIENT
Start: 2023-03-13 | End: 2023-09-26 | Stop reason: ALTCHOICE

## 2023-03-13 RX ORDER — HYDROXYZINE HYDROCHLORIDE 25 MG/1
25 TABLET, FILM COATED ORAL NIGHTLY PRN
Qty: 30 TABLET | Refills: 2 | Status: SHIPPED | OUTPATIENT
Start: 2023-03-13 | End: 2023-06-13

## 2023-03-13 NOTE — PROGRESS NOTES
Subjective:       Patient ID: Vinny Stover is a 57 y.o. male.    Chief Complaint: Insomnia (Patient advises that he has been having trouble sleeping after being covid positive in the beginning of February. He spent 3-4 weeks sitting up sleeping in a recliner d/t SOB, coughing. That has improved, but now is sleeping 4-5 hours now. He is looking for something that is not long term, but he needs help now. Melatonin has not helped. His mother  from an opioid sleep med OD and meds worry him. )      HPI    Vinny Stover is a 57 y.o. male who presents today with complaints of Insomnia (Patient advises that he has been having trouble sleeping after being covid positive in the beginning of February. He spent 3-4 weeks sitting up sleeping in a recliner d/t SOB, coughing. That has improved, but now is sleeping 4-5 hours now. He is looking for something that is not long term, but he needs help now. Melatonin has not helped. His mother  from an opioid sleep med OD and meds worry him. )  Reports resolution of of cough/SOB symptoms. Reports anxiousness and racing thoughts keeping him from sleeping. Reports wife states he snores but unsure if he has apneic episodes. Reports fatigue and daytime somnolence.   Review of Systems   Constitutional:  Positive for fatigue. Negative for chills and fever.   Respiratory:  Negative for cough and shortness of breath.    Cardiovascular:  Negative for chest pain.   Psychiatric/Behavioral:  Positive for sleep disturbance. Negative for dysphoric mood. The patient is nervous/anxious.      Objective:      Physical Exam  Vitals and nursing note reviewed.   Constitutional:       General: He is not in acute distress.     Appearance: He is well-developed.   HENT:      Head: Normocephalic and atraumatic.   Eyes:      General: Lids are normal. No scleral icterus.     Extraocular Movements: Extraocular movements intact.      Conjunctiva/sclera: Conjunctivae normal.   Pulmonary:      Effort:  Pulmonary effort is normal.   Neurological:      Mental Status: He is alert.      Cranial Nerves: No cranial nerve deficit.   Psychiatric:         Mood and Affect: Affect normal. Mood is anxious.       Assessment:       1. Insomnia due to anxiety and fear    2. Snoring          Plan:   1. Insomnia due to anxiety and fear  -     traZODone (DESYREL) 50 MG tablet; Take 1 tablet (50 mg total) by mouth every evening.  Dispense: 30 tablet; Refill: 11  -     hydrOXYzine HCL (ATARAX) 25 MG tablet; Take 1 tablet (25 mg total) by mouth nightly as needed for Anxiety.  Dispense: 30 tablet; Refill: 2    2. Snoring  -     Ambulatory referral/consult to Pulmonology; Future; Expected date: 03/20/2023

## 2023-03-14 ENCOUNTER — HOSPITAL ENCOUNTER (OUTPATIENT)
Dept: RADIOLOGY | Facility: HOSPITAL | Age: 58
Discharge: HOME | End: 2023-03-14
Attending: FAMILY MEDICINE
Payer: COMMERCIAL

## 2023-03-14 ENCOUNTER — OFFICE VISIT (OUTPATIENT)
Dept: INTERNAL MEDICINE | Facility: CLINIC | Age: 58
End: 2023-03-14
Payer: COMMERCIAL

## 2023-03-14 VITALS
OXYGEN SATURATION: 98 % | DIASTOLIC BLOOD PRESSURE: 90 MMHG | HEIGHT: 65 IN | HEART RATE: 69 BPM | WEIGHT: 179.88 LBS | SYSTOLIC BLOOD PRESSURE: 138 MMHG | BODY MASS INDEX: 29.97 KG/M2 | RESPIRATION RATE: 18 BRPM

## 2023-03-14 DIAGNOSIS — I51.7 CARDIOMEGALY: ICD-10-CM

## 2023-03-14 DIAGNOSIS — F41.9 HYPOSOMNIA, INSOMNIA OR SLEEPLESSNESS ASSOCIATED WITH ANXIETY: ICD-10-CM

## 2023-03-14 DIAGNOSIS — F51.05 HYPOSOMNIA, INSOMNIA OR SLEEPLESSNESS ASSOCIATED WITH ANXIETY: ICD-10-CM

## 2023-03-14 DIAGNOSIS — F41.9 HYPOSOMNIA, INSOMNIA OR SLEEPLESSNESS ASSOCIATED WITH ANXIETY: Primary | ICD-10-CM

## 2023-03-14 DIAGNOSIS — F51.05 HYPOSOMNIA, INSOMNIA OR SLEEPLESSNESS ASSOCIATED WITH ANXIETY: Primary | ICD-10-CM

## 2023-03-14 PROBLEM — E66.09 CLASS 1 OBESITY DUE TO EXCESS CALORIES WITHOUT SERIOUS COMORBIDITY WITH BODY MASS INDEX (BMI) OF 30.0 TO 30.9 IN ADULT: Chronic | Status: RESOLVED | Noted: 2018-04-05 | Resolved: 2023-03-14

## 2023-03-14 PROBLEM — E66.811 CLASS 1 OBESITY DUE TO EXCESS CALORIES WITHOUT SERIOUS COMORBIDITY WITH BODY MASS INDEX (BMI) OF 30.0 TO 30.9 IN ADULT: Chronic | Status: RESOLVED | Noted: 2018-04-05 | Resolved: 2023-03-14

## 2023-03-14 PROCEDURE — 71046 XR CHEST PA AND LATERAL: ICD-10-PCS | Mod: 26,,, | Performed by: RADIOLOGY

## 2023-03-14 PROCEDURE — 71046 X-RAY EXAM CHEST 2 VIEWS: CPT | Mod: TC

## 2023-03-14 PROCEDURE — 71046 X-RAY EXAM CHEST 2 VIEWS: CPT | Mod: 26,,, | Performed by: RADIOLOGY

## 2023-03-14 PROCEDURE — 99999 PR PBB SHADOW E&M-EST. PATIENT-LVL IV: ICD-10-PCS | Mod: PBBFAC,,, | Performed by: FAMILY MEDICINE

## 2023-03-14 PROCEDURE — 99214 OFFICE O/P EST MOD 30 MIN: CPT | Mod: S$GLB,,, | Performed by: FAMILY MEDICINE

## 2023-03-14 PROCEDURE — 99999 PR PBB SHADOW E&M-EST. PATIENT-LVL IV: CPT | Mod: PBBFAC,,, | Performed by: FAMILY MEDICINE

## 2023-03-14 PROCEDURE — 99214 PR OFFICE/OUTPT VISIT, EST, LEVL IV, 30-39 MIN: ICD-10-PCS | Mod: S$GLB,,, | Performed by: FAMILY MEDICINE

## 2023-03-14 NOTE — PROGRESS NOTES
"Subjective:       Patient ID: Vinny Stover is a 57 y.o. male.    Chief Complaint: Follow-up, Insomnia, and Chest Pain    57-year-old male patient of Dr. Garcia with Patient Active Problem List:     Pre-diabetes     Obstructive sleep apnea syndrome     Elevated PSA     Decreased range of motion     Decreased  strength of right hand     Decreased activities of daily living (ADL)  Here reports that patient has been sleeping in recliner since he had COVID last month- and lately started developing sleeping issues having difficulty falling asleep and would like to take some as needed medication but not daily medication and was seen by ÁNGEL yesterday and was placed on trazodone and hydroxyzine  Would like to discuss in detail  Patient took trazodone but reported that he did not sleep well with it and has been feeling tired  Reports minimal anxiety but not significant enough  Occasionally has been having chest tightness but not regularly  Patient has not been evaluated for sleep apnea yet    Review of Systems   Constitutional:  Negative for appetite change and fatigue.   Eyes:  Negative for visual disturbance.   Respiratory:  Negative for shortness of breath.    Cardiovascular:  Negative for chest pain, palpitations and leg swelling.   Gastrointestinal:  Negative for abdominal pain, nausea and vomiting.   Musculoskeletal:  Negative for myalgias.   Skin:  Negative for rash.   Neurological:  Negative for headaches.   Psychiatric/Behavioral:  Positive for sleep disturbance. Negative for dysphoric mood. The patient is nervous/anxious.        BP (!) 138/90 (BP Location: Left arm, Patient Position: Sitting, BP Method: Large (Manual))   Pulse 69   Resp 18   Ht 5' 5" (1.651 m)   Wt 81.6 kg (179 lb 14.3 oz)   SpO2 98%   BMI 29.94 kg/m²   Objective:      Physical Exam  Constitutional:       Appearance: He is well-developed.   HENT:      Head: Normocephalic and atraumatic.   Cardiovascular:      Rate and Rhythm: " Normal rate and regular rhythm.      Heart sounds: Normal heart sounds. No murmur heard.  Pulmonary:      Effort: Pulmonary effort is normal.      Breath sounds: Normal breath sounds. No wheezing.   Chest:      Chest wall: No tenderness.   Abdominal:      General: Bowel sounds are normal.      Palpations: Abdomen is soft.      Tenderness: There is no abdominal tenderness.   Skin:     General: Skin is warm and dry.      Findings: No rash.   Neurological:      Mental Status: He is alert and oriented to person, place, and time.   Psychiatric:         Mood and Affect: Mood normal.         Assessment/Plan:   1. Hyposomnia, insomnia or sleeplessness associated with anxiety  -     X-Ray Chest PA And Lateral; Future; Expected date: 03/14/2023  Patient was advised to continue trazodone 50 mg at bedtime for 10 days to 2 weeks regularly and maintain good sleep regimen  If unable to sleep for next 2-3 hours after taking trazodone can try taking half tablet of hydroxyzine as needed  Encouraged avoid stress    2. Cardiomegaly  -     CBC Auto Differential; Future; Expected date: 03/14/2023  -     Basic Metabolic Panel; Future; Expected date: 03/14/2023  -     B-TYPE NATRIURETIC PEPTIDE; Future; Expected date: 03/14/2023  Reviewed previous chest x-ray showing cardiomegaly with prominence likely from previous history of COVID a month ago-will plan to repeat chest x-ray and lab today to rule out cardiac etiology

## 2023-03-20 ENCOUNTER — OFFICE VISIT (OUTPATIENT)
Dept: PULMONOLOGY | Facility: CLINIC | Age: 58
End: 2023-03-20
Payer: COMMERCIAL

## 2023-03-20 VITALS
DIASTOLIC BLOOD PRESSURE: 80 MMHG | BODY MASS INDEX: 30.49 KG/M2 | HEART RATE: 77 BPM | HEIGHT: 65 IN | RESPIRATION RATE: 18 BRPM | SYSTOLIC BLOOD PRESSURE: 120 MMHG | OXYGEN SATURATION: 99 % | WEIGHT: 183 LBS

## 2023-03-20 DIAGNOSIS — R06.83 SNORING: ICD-10-CM

## 2023-03-20 DIAGNOSIS — G47.00 INSOMNIA, UNSPECIFIED TYPE: ICD-10-CM

## 2023-03-20 DIAGNOSIS — G47.33 OSA (OBSTRUCTIVE SLEEP APNEA): Primary | ICD-10-CM

## 2023-03-20 PROCEDURE — 99243 PR OFFICE CONSULTATION,LEVEL III: ICD-10-PCS | Mod: S$GLB,,, | Performed by: NURSE PRACTITIONER

## 2023-03-20 PROCEDURE — 99243 OFF/OP CNSLTJ NEW/EST LOW 30: CPT | Mod: S$GLB,,, | Performed by: NURSE PRACTITIONER

## 2023-03-20 PROCEDURE — 99999 PR PBB SHADOW E&M-EST. PATIENT-LVL IV: CPT | Mod: PBBFAC,,, | Performed by: NURSE PRACTITIONER

## 2023-03-20 PROCEDURE — 99999 PR PBB SHADOW E&M-EST. PATIENT-LVL IV: ICD-10-PCS | Mod: PBBFAC,,, | Performed by: NURSE PRACTITIONER

## 2023-03-23 ENCOUNTER — PATIENT OUTREACH (OUTPATIENT)
Dept: ADMINISTRATIVE | Facility: HOSPITAL | Age: 58
End: 2023-03-23
Payer: COMMERCIAL

## 2023-03-29 ENCOUNTER — TELEPHONE (OUTPATIENT)
Dept: SLEEP MEDICINE | Facility: CLINIC | Age: 58
End: 2023-03-29
Payer: COMMERCIAL

## 2023-03-29 NOTE — TELEPHONE ENCOUNTER
----- Message from Deysi Cuellar sent at 3/29/2023 11:45 AM CDT -----  Contact: Vinny  Patient is calling to speak with the nurse regarding questions and concerns. Reports having question about medication. Please give patient a call back at .[phone]

## 2023-04-28 ENCOUNTER — HOSPITAL ENCOUNTER (OUTPATIENT)
Dept: SLEEP MEDICINE | Facility: HOSPITAL | Age: 58
Discharge: HOME | End: 2023-04-28
Attending: NURSE PRACTITIONER
Payer: COMMERCIAL

## 2023-04-28 DIAGNOSIS — G47.33 OSA (OBSTRUCTIVE SLEEP APNEA): ICD-10-CM

## 2023-04-28 DIAGNOSIS — F51.04 PSYCHOPHYSIOLOGICAL INSOMNIA: Primary | ICD-10-CM

## 2023-04-28 DIAGNOSIS — Z72.821 INADEQUATE SLEEP HYGIENE: ICD-10-CM

## 2023-04-28 PROCEDURE — 95810 POLYSOM 6/> YRS 4/> PARAM: CPT

## 2023-05-02 PROCEDURE — 95810 PR POLYSOMNOGRAPHY, 4 OR MORE: ICD-10-PCS | Mod: 26,52,, | Performed by: PSYCHOLOGIST

## 2023-05-02 PROCEDURE — 95810 POLYSOM 6/> YRS 4/> PARAM: CPT | Mod: 26,52,, | Performed by: PSYCHOLOGIST

## 2023-05-03 PROBLEM — F51.04 PSYCHOPHYSIOLOGICAL INSOMNIA: Status: ACTIVE | Noted: 2018-02-28

## 2023-05-03 NOTE — PROCEDURES
"Patient Name: Carlos Stover Date of Report: 23   Study Date:  2023  [facility] No.: [id number]  : 1965   Time of Study:  09:38:43 PM - 03:25:36 AM   Sex:  Male   Age:  58 year     Weight:  182.0 lbs    Height:  5' 5" Type of Study:  Diagnostic    REASONS FOR REFERRAL: Mr. Stover is a 58 year year old male, referred for diagnostic polysomnography by Elizabeth LeJeune, APRN, based on the patient's reported loud snoring, observed respiratory pauses in sleep, and daytime somnolence.  His Central Sleepiness Scale score was 2, not clinically significant, but his STOP-BANG score was 5, high risk of KARI.  Eva Ortiz PA-C was the originating referring provider, and Dr. GAL Garcia is the patient's primary care provider.       STUDY PARAMETERS: This diagnostic study involved analysis of the patient's sleep pattern while breathing unassisted. The study was performed with a sleep technologist in attendance for the entire test period, with video monitoring throughout the study, and routine laboratory clinical parameters recorded:  NOTE:  This polysomnographic sleep study was reviewed epoch-by-epoch, interpreted and signed below by an American Academy of Sleep Medicine Board Certified Sleep Specialist    SUMMARY STATEMENTS    DIAGNOSTIC IMPRESSIONS  F51.04   / 307.42 Psychophysiological Insomnia (stress - related and conditioned; with anxiety)    Z72.821 / V69.4 Mild Inadequate Sleep Hygiene  G47.22  / 327.32 r/o Advanced Sleep - Wake Phase Disorder     TREATMENT RECOMMENDATIONS  Treat, or refer to Sleep Disorders Center.  The diagnostic sleep study began at 09:38:43 PM and ended at 03:25:36 AM, for a recording time of 346.9 minutes (5.8 hrs). The total sleep time (TST) was 2.0 minutes (0.03 hrs) which resulted in a sleep efficiency (TST÷TRT) of 0.6 %.  The sleep period time (SPT) was 4.0 minutes.  The sleep latency (SL) was 334.1 minutes (see SLEEP ARCHITECTURE, below).  There was insufficient sleep " to assess for any of the usual polysomnographic sleep variables or sleep disorder diagnostic variables.  Mr. Stover is taking two medications which he most likely uses for treatment of insomnia (trazodone / Desyrel) and anxiety (hydroxyzine / Atarax), neither of which was able to effect sleep onset and maintenance. He had 7 hrs sleep the prior night.  His only sleep position was left lateral.  Weight loss to the normal range is recommended as it can decrease respiratory events and snoring in overweight patients.  The following changes in sleep hygiene / sleep - related behavior are recommended after medical treatments are successful  Avoid meals or large snacks within 3 hours of bedtime.  If insomnia persists after medical sleep disorders have been  diagnosed and treatment has have proven effective, recommend follow - up inquiry regarding frequency, duration and nature of reported sleep loss, delayed sleep onset and  poor sleep maintenance (stress - related and / or conditioned psychophysiological insomnia;  r/o  advanced sleep - wake phase disorder) and referral for behavioral and cognitive - behavioral treatment of insomnia, as indicated.  Please see SDI.  Consider behavioral and cognitive / behavioral treatments for stress and anxiety to complement the medication and to increase the probability of long - term adaptive change.  Sleep might be expected to further improve.    See below for a complete interpretation of data from the polysomnography and Sleep Disorders Inventory.     Thank you for referring this patient to the [facility].      Mark Carroll, Ph.D., ABPP; Diplomate, American Board of Sleep Medicine

## 2023-06-06 ENCOUNTER — TELEPHONE (OUTPATIENT)
Dept: UROLOGY | Facility: CLINIC | Age: 58
End: 2023-06-06
Payer: COMMERCIAL

## 2023-06-12 ENCOUNTER — LAB VISIT (OUTPATIENT)
Dept: LAB | Facility: HOSPITAL | Age: 58
End: 2023-06-12
Attending: UROLOGY
Payer: COMMERCIAL

## 2023-06-12 DIAGNOSIS — R97.20 ELEVATED PSA: ICD-10-CM

## 2023-06-12 DIAGNOSIS — F51.05 INSOMNIA DUE TO ANXIETY AND FEAR: ICD-10-CM

## 2023-06-12 DIAGNOSIS — F40.9 INSOMNIA DUE TO ANXIETY AND FEAR: ICD-10-CM

## 2023-06-12 PROCEDURE — 84153 ASSAY OF PSA TOTAL: CPT | Performed by: UROLOGY

## 2023-06-12 PROCEDURE — 36415 COLL VENOUS BLD VENIPUNCTURE: CPT | Performed by: UROLOGY

## 2023-06-12 NOTE — TELEPHONE ENCOUNTER
Refill Routing Note   Medication(s) are not appropriate for processing by [facility] for the following reason(s):      Medication outside of protocol    [facility] action(s):  Route None identified          Appointments  past 12m or future 3m with PCP    Date Provider   Last Visit   3/13/2023 Eva Ortiz PA-C   Next Visit   Visit date not found Eva Oritz PA-C   ED visits in past 90 days: 0        Note composed:3:37 PM 06/12/2023

## 2023-06-13 LAB — COMPLEXED PSA SERPL-MCNC: 4.1 NG/ML (ref 0–4)

## 2023-06-13 RX ORDER — HYDROXYZINE HYDROCHLORIDE 25 MG/1
TABLET, FILM COATED ORAL
Qty: 30 TABLET | Refills: 2 | Status: SHIPPED | OUTPATIENT
Start: 2023-06-13 | End: 2023-09-26 | Stop reason: ALTCHOICE

## 2023-07-05 ENCOUNTER — OFFICE VISIT (OUTPATIENT)
Dept: UROLOGY | Facility: CLINIC | Age: 58
End: 2023-07-05
Payer: COMMERCIAL

## 2023-07-05 VITALS
TEMPERATURE: 98 F | HEIGHT: 65 IN | RESPIRATION RATE: 18 BRPM | BODY MASS INDEX: 29.24 KG/M2 | WEIGHT: 175.5 LBS | HEART RATE: 71 BPM | SYSTOLIC BLOOD PRESSURE: 156 MMHG | DIASTOLIC BLOOD PRESSURE: 70 MMHG

## 2023-07-05 DIAGNOSIS — N40.1 BPH WITH OBSTRUCTION/LOWER URINARY TRACT SYMPTOMS: Primary | ICD-10-CM

## 2023-07-05 DIAGNOSIS — R97.20 ELEVATED PSA: ICD-10-CM

## 2023-07-05 DIAGNOSIS — N13.8 BPH WITH OBSTRUCTION/LOWER URINARY TRACT SYMPTOMS: Primary | ICD-10-CM

## 2023-07-05 PROCEDURE — 99999 PR PBB SHADOW E&M-EST. PATIENT-LVL III: ICD-10-PCS | Mod: PBBFAC,,, | Performed by: UROLOGY

## 2023-07-05 PROCEDURE — 99999 PR PBB SHADOW E&M-EST. PATIENT-LVL III: CPT | Mod: PBBFAC,,, | Performed by: UROLOGY

## 2023-07-05 PROCEDURE — 99213 OFFICE O/P EST LOW 20 MIN: CPT | Mod: S$GLB,,, | Performed by: UROLOGY

## 2023-07-05 PROCEDURE — 99213 PR OFFICE/OUTPT VISIT, EST, LEVL III, 20-29 MIN: ICD-10-PCS | Mod: S$GLB,,, | Performed by: UROLOGY

## 2023-07-05 NOTE — PROGRESS NOTES
Chief Complaint   Patient presents with    Other     F/u           History of Present Illness:   Vinny Stover is a 58 y.o. male here for evaluation of Other (F/u)      7/5/23- Never took Alfuzosin because he didn't want to take medication if he didn't have to. Walking a lot more. Nocturia is improved, now to 1-2. Still has some urgency. Stream is good. No dysuria. Had some slight pain in his left flank the other day after running 6 miles for the first time. Has a h/o stones, but none for years.   12/19/22- here to further discuss flomax. It has helped his urination, but has been making him dizzy. He also notes ejaculatory changes. Doesn't really like to take medication.   12/13/22-TRUS biopsy pathology benign with chronic inflammation. He does have urinary frequency and urgency. He has a good stream. No hematuria at this point.   11/28/22-here for TRUS biopsy. 60g prostate  11/7/22- PSA up a little. No stranguria or dysuria. He does have frequency. Nocturia x 3. Drinks a lot of coffee.   8/5/22- MRI read as PI RADS 2. Prostate measures at 61cc. No stranguria, gross hematuria. He does have frequency. PSA did decrease after last visit to 5.3.   7/5/22- 58yo male here for evaluation of elevated PSA of 7.7. Pt denies any h/o elevated PSA. Did have prostatitis 5-6 years ago and was given antibiotics at that time. Reports having urinary frequency. Does drink a lot of coffee. Nocturia x 2. No gross hematuria or dysuria. Stream is strong.   No family h/o CaP.       Review of Systems   Constitutional:  Negative for chills and fever.   Respiratory:  Negative for shortness of breath.    Cardiovascular:  Negative for chest pain.   Genitourinary:  Negative for dysuria and hematuria.   All other systems reviewed and are negative.    Past Medical History:   Diagnosis Date    Aphthous stomatitis 12/26/2019    Arthritis     Colon polyp     Gastroesophageal reflux disease without esophagitis 02/28/2018    Obstructive sleep  apnea syndrome 12/26/2019       Past Surgical History:   Procedure Laterality Date    COLONOSCOPY  2015    NOSE SURGERY         Family History   Problem Relation Age of Onset    Drug abuse Mother     No Known Problems Father     Colon cancer Paternal Grandfather        Social History     Tobacco Use    Smoking status: Never     Passive exposure: Never    Smokeless tobacco: Never   Substance Use Topics    Alcohol use: No    Drug use: No       Current Outpatient Medications   Medication Sig Dispense Refill    alfuzosin (UROXATRAL) 10 mg Tb24 Take 1 tablet (10 mg total) by mouth daily with breakfast. 30 tablet 11    hydrOXYzine HCL (ATARAX) 25 MG tablet TAKE 1 TABLET BY MOUTH NIGHTLY AS NEEDED FOR ANXIETY. 30 tablet 2    traZODone (DESYREL) 50 MG tablet Take 1 tablet (50 mg total) by mouth every evening. 30 tablet 11     No current facility-administered medications for this visit.       Review of patient's allergies indicates:  No Known Allergies    Physical Exam  Vitals:    07/05/23 1528   BP: (!) 156/70   Pulse: 71   Resp: 18   Temp: 97.5 °F (36.4 °C)             General: Well-developed, well-nourished in no acute distress  HEENT: Normocephalic, atraumatic, Extraocular movements intact  Neck: supple, trachea midline, no cervical or supraclavicular lymphadenopathy  Respirations: even and unlabored  Rectal: 7/5/23-30g prostate, no nodules or tenderness. No gross blood  Extremities: atraumatic, moves all equally, no clubbing, cyanosis or edema  Psych: normal affect  Skin: warm and dry, no lesions  Neuro: Alert and oriented, Cranial nerves II-XII grossly intact    UA: negative for Blood, LE, nit    PVR: 69cc 7/5/22    PSA  3/26/19: 3.0  6/20/22: 7.7  7/8/22: 5.3  8/5/22: 4.1  11/3/22: 5.1  6/12/23: 4.1    Assessment:   1. BPH with obstruction/lower urinary tract symptoms        2. Elevated PSA  Prostate Specific Antigen, Diagnostic                    Plan:  BPH with obstruction/lower urinary tract symptoms    Elevated  PSA  -     Prostate Specific Antigen, Diagnostic; Future; Expected date: 01/05/2024        Discussed pharmacologic management of BPH symptoms. Pt currently not bothered, so will remain off of alpha blocker for now.       Follow up in about 6 months (around 1/5/2024) for labs before visit.

## 2023-07-18 ENCOUNTER — OFFICE VISIT (OUTPATIENT)
Dept: INTERNAL MEDICINE | Facility: CLINIC | Age: 58
End: 2023-07-18
Payer: COMMERCIAL

## 2023-07-18 VITALS
DIASTOLIC BLOOD PRESSURE: 70 MMHG | TEMPERATURE: 98 F | BODY MASS INDEX: 29.17 KG/M2 | SYSTOLIC BLOOD PRESSURE: 130 MMHG | WEIGHT: 175.06 LBS | OXYGEN SATURATION: 98 % | HEIGHT: 65 IN | HEART RATE: 66 BPM

## 2023-07-18 DIAGNOSIS — Z00.00 ANNUAL PHYSICAL EXAM: Primary | ICD-10-CM

## 2023-07-18 DIAGNOSIS — R73.03 PRE-DIABETES: ICD-10-CM

## 2023-07-18 DIAGNOSIS — R97.20 ELEVATED PSA: ICD-10-CM

## 2023-07-18 PROCEDURE — 99213 OFFICE O/P EST LOW 20 MIN: CPT | Mod: S$GLB,,, | Performed by: PHYSICIAN ASSISTANT

## 2023-07-18 PROCEDURE — 99999 PR PBB SHADOW E&M-EST. PATIENT-LVL III: CPT | Mod: PBBFAC,,, | Performed by: PHYSICIAN ASSISTANT

## 2023-07-18 PROCEDURE — 99999 PR PBB SHADOW E&M-EST. PATIENT-LVL III: ICD-10-PCS | Mod: PBBFAC,,, | Performed by: PHYSICIAN ASSISTANT

## 2023-07-18 PROCEDURE — 99213 PR OFFICE/OUTPT VISIT, EST, LEVL III, 20-29 MIN: ICD-10-PCS | Mod: S$GLB,,, | Performed by: PHYSICIAN ASSISTANT

## 2023-07-18 NOTE — PROGRESS NOTES
Subjective:      Patient ID: Vinny Stover is a 58 y.o. male.    Chief Complaint: Annual Exam    HPI  Here today for his annual exam and blood work.   Training for a half marathon.   Covid earlier this year. Severe symptoms. Significant shortness of breath and fatigue. Much better now. Eye opening because now working hard on his health.   Lost 15 lbs. Sleeping well at night. Working on weight loss. Lifestyle changes.   No chest pain or shortness of breath.     Wt Readings from Last 3 Encounters:   07/18/23 1433 79.4 kg (175 lb 0.7 oz)   07/05/23 1528 79.6 kg (175 lb 7.8 oz)   03/20/23 0809 83 kg (182 lb 15.7 oz)      Patient Active Problem List   Diagnosis    Psychophysiological insomnia    Pre-diabetes    KARI (obstructive sleep apnea)    Elevated PSA    Decreased range of motion    Decreased  strength of right hand    Decreased activities of daily living (ADL)         Current Outpatient Medications:     alfuzosin (UROXATRAL) 10 mg Tb24, Take 1 tablet (10 mg total) by mouth daily with breakfast. (Patient not taking: Reported on 7/18/2023), Disp: 30 tablet, Rfl: 11    hydrOXYzine HCL (ATARAX) 25 MG tablet, TAKE 1 TABLET BY MOUTH NIGHTLY AS NEEDED FOR ANXIETY. (Patient not taking: Reported on 7/18/2023), Disp: 30 tablet, Rfl: 2    traZODone (DESYREL) 50 MG tablet, Take 1 tablet (50 mg total) by mouth every evening. (Patient not taking: Reported on 7/18/2023), Disp: 30 tablet, Rfl: 11    Review of Systems   Constitutional:  Negative for activity change, appetite change, chills, diaphoresis, fatigue, fever and unexpected weight change.   HENT: Negative.  Negative for congestion, hearing loss, postnasal drip, rhinorrhea, sore throat, trouble swallowing and voice change.    Eyes: Negative.  Negative for visual disturbance.   Respiratory: Negative.  Negative for cough, choking, chest tightness and shortness of breath.    Cardiovascular:  Negative for chest pain, palpitations and leg swelling.   Gastrointestinal:   "Negative for abdominal distention, abdominal pain, blood in stool, constipation, diarrhea, nausea and vomiting.   Endocrine: Negative for cold intolerance, heat intolerance, polydipsia and polyuria.   Genitourinary: Negative.  Negative for difficulty urinating and frequency.   Musculoskeletal:  Negative for arthralgias, back pain, gait problem, joint swelling and myalgias.   Skin:  Negative for color change, pallor, rash and wound.   Neurological:  Negative for dizziness, tremors, weakness, light-headedness, numbness and headaches.   Hematological:  Negative for adenopathy.   Psychiatric/Behavioral:  Negative for behavioral problems, confusion, self-injury, sleep disturbance and suicidal ideas. The patient is not nervous/anxious.    Objective:   /70 (BP Location: Left arm, Patient Position: Sitting, BP Method: Large (Manual))   Pulse 66   Temp 98.1 °F (36.7 °C) (Tympanic)   Ht 5' 5" (1.651 m)   Wt 79.4 kg (175 lb 0.7 oz)   SpO2 98%   BMI 29.13 kg/m²     Physical Exam  Vitals reviewed.   Constitutional:       General: He is not in acute distress.     Appearance: Normal appearance. He is well-developed. He is not ill-appearing, toxic-appearing or diaphoretic.   HENT:      Head: Normocephalic and atraumatic.      Right Ear: External ear normal.      Left Ear: External ear normal.      Nose: Nose normal.   Eyes:      Conjunctiva/sclera: Conjunctivae normal.      Pupils: Pupils are equal, round, and reactive to light.   Cardiovascular:      Rate and Rhythm: Normal rate and regular rhythm.      Heart sounds: Normal heart sounds. No murmur heard.    No friction rub. No gallop.   Pulmonary:      Effort: Pulmonary effort is normal. No respiratory distress.      Breath sounds: Normal breath sounds. No wheezing or rales.   Chest:      Chest wall: No tenderness.   Abdominal:      General: There is no distension.      Palpations: Abdomen is soft.      Tenderness: There is no abdominal tenderness.   Musculoskeletal:   "       General: Normal range of motion.      Cervical back: Normal range of motion and neck supple.   Lymphadenopathy:      Cervical: No cervical adenopathy.   Skin:     General: Skin is warm and dry.      Capillary Refill: Capillary refill takes less than 2 seconds.      Findings: No rash.   Neurological:      Mental Status: He is alert and oriented to person, place, and time.      Motor: No weakness.      Coordination: Coordination normal.      Gait: Gait normal.   Psychiatric:         Mood and Affect: Mood normal.         Behavior: Behavior normal.         Thought Content: Thought content normal.         Judgment: Judgment normal.       Assessment:     1. Annual physical exam    2. Pre-diabetes    3. Elevated PSA      Plan:   Annual physical exam  -     CBC Auto Differential; Future; Expected date: 07/18/2023  -     Comprehensive Metabolic Panel; Future; Expected date: 07/18/2023  -     Lipid Panel; Future; Expected date: 07/18/2023  -     Hepatitis C Antibody; Future; Expected date: 07/18/2023  -     Hemoglobin A1C; Future; Expected date: 07/18/2023  -     HIV 1/2 Ag/Ab (4th Gen); Future; Expected date: 07/18/2023    Pre-diabetes  -     Comprehensive Metabolic Panel; Future; Expected date: 07/18/2023  -     Hemoglobin A1C; Future; Expected date: 07/18/2023    Elevated PSA    -up to date with urology    -shingles vaccine and tetanus at pharmacy    Follow up in about 6 months (around 1/18/2024), or if symptoms worsen or fail to improve.

## 2023-07-19 LAB
ALBUMIN SERPL-MCNC: 4.6 G/DL (ref 3.8–4.9)
ALBUMIN/GLOB SERPL: 1.9 {RATIO} (ref 1.2–2.2)
ALP SERPL-CCNC: 91 IU/L (ref 44–121)
ALT SERPL-CCNC: 19 IU/L (ref 0–44)
AST SERPL-CCNC: 21 IU/L (ref 0–40)
BASOPHILS # BLD AUTO: 0 X10E3/UL (ref 0–0.2)
BASOPHILS NFR BLD AUTO: 1 %
BILIRUB SERPL-MCNC: 0.3 MG/DL (ref 0–1.2)
BUN SERPL-MCNC: 12 MG/DL (ref 6–24)
BUN/CREAT SERPL: 12 (ref 9–20)
CALCIUM SERPL-MCNC: 9.5 MG/DL (ref 8.7–10.2)
CHLORIDE SERPL-SCNC: 103 MMOL/L (ref 96–106)
CHOLEST SERPL-MCNC: 142 MG/DL (ref 100–199)
CO2 SERPL-SCNC: 26 MMOL/L (ref 20–29)
CREAT SERPL-MCNC: 1.01 MG/DL (ref 0.76–1.27)
EOSINOPHIL # BLD AUTO: 0.1 X10E3/UL (ref 0–0.4)
EOSINOPHIL NFR BLD AUTO: 2 %
ERYTHROCYTE [DISTWIDTH] IN BLOOD BY AUTOMATED COUNT: 13 % (ref 11.6–15.4)
EST. GFR  (NO RACE VARIABLE): 87 ML/MIN/1.73
GLOBULIN SER CALC-MCNC: 2.4 G/DL (ref 1.5–4.5)
GLUCOSE SERPL-MCNC: 94 MG/DL (ref 70–99)
HBA1C MFR BLD: 5.3 % (ref 4.8–5.6)
HCT VFR BLD AUTO: 40.9 % (ref 37.5–51)
HCV IGG SERPL QL IA: NON REACTIVE
HDLC SERPL-MCNC: 50 MG/DL
HGB BLD-MCNC: 13.9 G/DL (ref 13–17.7)
HIV 1+2 AB+HIV1 P24 AG SERPL QL IA: NON REACTIVE
IMM GRANULOCYTES # BLD AUTO: 0 X10E3/UL (ref 0–0.1)
IMM GRANULOCYTES NFR BLD AUTO: 0 %
LDLC SERPL CALC-MCNC: 71 MG/DL (ref 0–99)
LYMPHOCYTES # BLD AUTO: 2 X10E3/UL (ref 0.7–3.1)
LYMPHOCYTES NFR BLD AUTO: 32 %
MCH RBC QN AUTO: 32.4 PG (ref 26.6–33)
MCHC RBC AUTO-ENTMCNC: 34 G/DL (ref 31.5–35.7)
MCV RBC AUTO: 95 FL (ref 79–97)
MONOCYTES # BLD AUTO: 0.6 X10E3/UL (ref 0.1–0.9)
MONOCYTES NFR BLD AUTO: 10 %
NEUTROPHILS # BLD AUTO: 3.5 X10E3/UL (ref 1.4–7)
NEUTROPHILS NFR BLD AUTO: 55 %
PLATELET # BLD AUTO: 217 X10E3/UL (ref 150–450)
POTASSIUM SERPL-SCNC: 4.6 MMOL/L (ref 3.5–5.2)
PROT SERPL-MCNC: 7 G/DL (ref 6–8.5)
RBC # BLD AUTO: 4.29 X10E6/UL (ref 4.14–5.8)
SODIUM SERPL-SCNC: 141 MMOL/L (ref 134–144)
TRIGL SERPL-MCNC: 119 MG/DL (ref 0–149)
VLDLC SERPL CALC-MCNC: 21 MG/DL (ref 5–40)
WBC # BLD AUTO: 6.2 X10E3/UL (ref 3.4–10.8)

## 2023-09-22 ENCOUNTER — TELEPHONE (OUTPATIENT)
Dept: PRIMARY CARE CLINIC | Facility: CLINIC | Age: 58
End: 2023-09-22
Payer: COMMERCIAL

## 2023-09-22 NOTE — TELEPHONE ENCOUNTER
Called pt regarding appointment on 9/26 with Dr. Burgos having to be anjum due to provider not accepting new patients at the time, he was fine with switching over to Dr. Kwok

## 2023-09-26 ENCOUNTER — OFFICE VISIT (OUTPATIENT)
Dept: PRIMARY CARE CLINIC | Facility: CLINIC | Age: 58
End: 2023-09-26
Payer: COMMERCIAL

## 2023-09-26 VITALS
DIASTOLIC BLOOD PRESSURE: 78 MMHG | BODY MASS INDEX: 29.55 KG/M2 | SYSTOLIC BLOOD PRESSURE: 128 MMHG | WEIGHT: 177.56 LBS | TEMPERATURE: 97 F | HEART RATE: 60 BPM | OXYGEN SATURATION: 98 %

## 2023-09-26 DIAGNOSIS — Z76.89 ESTABLISHING CARE WITH NEW DOCTOR, ENCOUNTER FOR: Primary | ICD-10-CM

## 2023-09-26 DIAGNOSIS — Z00.00 ROUTINE ADULT HEALTH MAINTENANCE: ICD-10-CM

## 2023-09-26 DIAGNOSIS — R97.20 ELEVATED PSA: ICD-10-CM

## 2023-09-26 PROBLEM — Z78.9 DECREASED ACTIVITIES OF DAILY LIVING (ADL): Status: RESOLVED | Noted: 2022-12-21 | Resolved: 2023-09-26

## 2023-09-26 PROBLEM — M25.60 DECREASED RANGE OF MOTION: Status: RESOLVED | Noted: 2022-12-21 | Resolved: 2023-09-26

## 2023-09-26 PROBLEM — F51.04 PSYCHOPHYSIOLOGICAL INSOMNIA: Status: RESOLVED | Noted: 2018-02-28 | Resolved: 2023-09-26

## 2023-09-26 PROCEDURE — 99213 OFFICE O/P EST LOW 20 MIN: CPT | Mod: S$GLB,,, | Performed by: INTERNAL MEDICINE

## 2023-09-26 PROCEDURE — 99213 PR OFFICE/OUTPT VISIT, EST, LEVL III, 20-29 MIN: ICD-10-PCS | Mod: S$GLB,,, | Performed by: INTERNAL MEDICINE

## 2023-09-26 PROCEDURE — 99999 PR PBB SHADOW E&M-EST. PATIENT-LVL III: ICD-10-PCS | Mod: PBBFAC,,, | Performed by: INTERNAL MEDICINE

## 2023-09-26 PROCEDURE — 99999 PR PBB SHADOW E&M-EST. PATIENT-LVL III: CPT | Mod: PBBFAC,,, | Performed by: INTERNAL MEDICINE

## 2023-09-26 NOTE — PROGRESS NOTES
Subjective     Patient ID: Vinny Stover is a 58 y.o. male.    Chief Complaint: Establish Care      HPI  Here to establish care.  Has been doing great since earlier this year.  He had covid and then went on a healthcare journey and lifestyle change.  Now doing marathons.  Feels great, sleeping.  No longer snoring.  Recent labs were good.  Sees Dr. Brasher/urology.      Past Medical History:   Diagnosis Date    Aphthous stomatitis 12/26/2019    Arthritis     Colon polyp     Gastroesophageal reflux disease without esophagitis 02/28/2018    Obstructive sleep apnea syndrome 12/26/2019     Review of patient's allergies indicates:  No Known Allergies  Past Surgical History:   Procedure Laterality Date    COLONOSCOPY  2015    NOSE SURGERY       Family History   Problem Relation Age of Onset    Drug abuse Mother     No Known Problems Father     Colon cancer Paternal Grandfather      Social History     Socioeconomic History    Marital status:    Occupational History     Employer: other   Tobacco Use    Smoking status: Never     Passive exposure: Never    Smokeless tobacco: Never   Substance and Sexual Activity    Alcohol use: No    Drug use: No    Sexual activity: Yes     Partners: Female     Social Determinants of Health     Financial Resource Strain: Low Risk  (10/19/2021)    Overall Financial Resource Strain (CARDIA)     Difficulty of Paying Living Expenses: Not hard at all   Food Insecurity: No Food Insecurity (10/19/2021)    Hunger Vital Sign     Worried About Running Out of Food in the Last Year: Never true     Ran Out of Food in the Last Year: Never true   Transportation Needs: No Transportation Needs (10/19/2021)    PRAPARE - Transportation     Lack of Transportation (Medical): No     Lack of Transportation (Non-Medical): No   Physical Activity: Sufficiently Active (10/19/2021)    Exercise Vital Sign     Days of Exercise per Week: 4 days     Minutes of Exercise per Session: 40 min   Stress: No Stress  Concern Present (10/19/2021)    [employer] - Occupational Stress Questionnaire     Feeling of Stress : Not at all   Social Connections: Unknown (10/19/2021)    Social Connection and Isolation Panel [NHANES]     Frequency of Communication with Friends and Family: More than three times a week     Frequency of Social Gatherings with Friends and Family: Twice a week     Active Member of Clubs or Organizations: Yes     Attends Club or Organization Meetings: 1 to 4 times per year     Marital Status:    Housing Stability: Unknown (10/19/2021)    Housing Stability Vital Sign     Unable to Pay for Housing in the Last Year: No     Unstable Housing in the Last Year: No         /78   Pulse 60   Temp 96.7 °F (35.9 °C) (Tympanic)   Wt 80.6 kg (177 lb 9.3 oz)   SpO2 98%   BMI 29.55 kg/m²   No current outpatient medications on file as of 9/26/2023.     No current facility-administered medications on file as of 9/26/2023.       Review of Systems   All other systems reviewed and are negative.         Objective     Physical Exam  Constitutional:       General: He is not in acute distress.     Appearance: Normal appearance. He is not ill-appearing, toxic-appearing or diaphoretic.   HENT:      Head: Normocephalic and atraumatic.      Mouth/Throat:      Mouth: Mucous membranes are moist.   Eyes:      Conjunctiva/sclera: Conjunctivae normal.   Neck:      Vascular: No carotid bruit.   Cardiovascular:      Rate and Rhythm: Normal rate and regular rhythm.      Heart sounds: No murmur heard.     No friction rub. No gallop.   Pulmonary:      Effort: Pulmonary effort is normal. No respiratory distress.      Breath sounds: Normal breath sounds. No wheezing or rales.   Musculoskeletal:      Cervical back: Neck supple.   Skin:     General: Skin is dry.   Neurological:      General: No focal deficit present.      Mental Status: He is alert and oriented to person, place, and time.   Psychiatric:          Mood and Affect: Mood normal.         Behavior: Behavior normal.            Assessment and Plan     1. Establishing care with new doctor, encounter for    2. Elevated PSA    3. Routine adult health maintenance         Follow up in about 10 months (around 7/26/2024), or if symptoms worsen or fail to improve, for annual .    Immunization History   Administered Date(s) Administered    COVID-19 Vaccine 03/13/2021, 04/14/2021, 12/21/2021    COVID-19, MRNA, LN-S, PF (MODERNA FULL 0.5 ML DOSE) 03/13/2021, 04/14/2021, 12/21/2021    Hepatitis B, Adult 06/06/2006    Influenza 10/06/2008    Influenza - Quadrivalent - PF *Preferred* (6 months and older) 10/06/2008, 03/25/2019, 10/22/2019, 10/03/2020

## 2023-11-07 ENCOUNTER — OFFICE VISIT (OUTPATIENT)
Dept: DERMATOLOGY | Facility: CLINIC | Age: 58
End: 2023-11-07
Payer: COMMERCIAL

## 2023-11-07 DIAGNOSIS — L57.0 ACTINIC KERATOSES: ICD-10-CM

## 2023-11-07 DIAGNOSIS — Z12.83 SCREENING FOR MALIGNANT NEOPLASM OF SKIN: ICD-10-CM

## 2023-11-07 DIAGNOSIS — L81.4 LENTIGINES: ICD-10-CM

## 2023-11-07 DIAGNOSIS — L82.1 SEBORRHEIC KERATOSES: Primary | ICD-10-CM

## 2023-11-07 PROCEDURE — 99999 PR PBB SHADOW E&M-EST. PATIENT-LVL II: ICD-10-PCS | Mod: PBBFAC,,, | Performed by: STUDENT IN AN ORGANIZED HEALTH CARE EDUCATION/TRAINING PROGRAM

## 2023-11-07 PROCEDURE — 99213 PR OFFICE/OUTPT VISIT, EST, LEVL III, 20-29 MIN: ICD-10-PCS | Mod: 25,S$GLB,, | Performed by: STUDENT IN AN ORGANIZED HEALTH CARE EDUCATION/TRAINING PROGRAM

## 2023-11-07 PROCEDURE — 99213 OFFICE O/P EST LOW 20 MIN: CPT | Mod: 25,S$GLB,, | Performed by: STUDENT IN AN ORGANIZED HEALTH CARE EDUCATION/TRAINING PROGRAM

## 2023-11-07 PROCEDURE — 99999 PR PBB SHADOW E&M-EST. PATIENT-LVL II: CPT | Mod: PBBFAC,,, | Performed by: STUDENT IN AN ORGANIZED HEALTH CARE EDUCATION/TRAINING PROGRAM

## 2023-11-07 NOTE — PROGRESS NOTES
Subjective:       Patient ID:  Vinny Stover is a 58 y.o. male who presents for   Chief Complaint   Patient presents with    Mole     Side and back     History of Present Illness: The patient presents with chief complaint of a skin lesions and skin examination.  Location: left abdomen  Duration: noticed a couple years, but it has been growing  Signs/Symptoms: brownish, warty growth on the stomach. Denies any other rapidly growing, bleeding or non healing skin lesions.   Prior treatments: none              Review of Systems   Constitutional:  Negative for fever and chills.   Skin:  Negative for itching, rash and dry skin.        Objective:    Physical Exam   Constitutional: He appears well-developed and well-nourished. No distress.   Neurological: He is alert and oriented to person, place, and time. He is not disoriented.   Psychiatric: He has a normal mood and affect.   Skin:   Areas Examined (abnormalities noted in diagram):   Head / Face Inspection Performed  Neck Inspection Performed  Chest / Axilla Inspection Performed  Abdomen Inspection Performed  Back Inspection Performed  RUE Inspected  LUE Inspection Performed                   Diagram Legend     Erythematous scaling macule/papule c/w actinic keratosis       Vascular papule c/w angioma      Pigmented verrucoid papule/plaque c/w seborrheic keratosis      Yellow umbilicated papule c/w sebaceous hyperplasia      Irregularly shaped tan macule c/w lentigo     1-2 mm smooth white papules consistent with Milia      Movable subcutaneous cyst with punctum c/w epidermal inclusion cyst      Subcutaneous movable cyst c/w pilar cyst      Firm pink to brown papule c/w dermatofibroma      Pedunculated fleshy papule(s) c/w skin tag(s)      Evenly pigmented macule c/w junctional nevus     Mildly variegated pigmented, slightly irregular-bordered macule c/w mildly atypical nevus      Flesh colored to evenly pigmented papule c/w intradermal nevus       Pink pearly  papule/plaque c/w basal cell carcinoma      Erythematous hyperkeratotic cursted plaque c/w SCC      Surgical scar with no sign of skin cancer recurrence      Open and closed comedones      Inflammatory papules and pustules      Verrucoid papule consistent consistent with wart     Erythematous eczematous patches and plaques     Dystrophic onycholytic nail with subungual debris c/w onychomycosis     Umbilicated papule    Erythematous-base heme-crusted tan verrucoid plaque consistent with inflamed seborrheic keratosis     Erythematous Silvery Scaling Plaque c/w Psoriasis     See annotation      Assessment / Plan:        Seborrheic keratoses  These are benign inherited growths without a malignant potential. Reassurance given to patient. No treatment is necessary.     Lentigines  This is a benign hyperpigmented sun induced lesion. Recommend daily sun protection/avoidance and use of at least SPF 30, broad spectrum sunscreen (OTC drug) will reduce the number of new lesions. Treatment of these benign lesions are considered cosmetic.    Actinic keratoses  Cryosurgery Procedure Note    Verbal consent from the patient is obtained including, but not limited to, risk of hypopigmentation/hyperpigmentation, scar, recurrence of lesion. The patient is aware of the precancerous quality and need for treatment of these lesions. Liquid nitrogen cryosurgery is applied to the 1 actinic keratoses, as detailed in the physical exam, to produce a freeze injury. The patient is aware that blisters may form and is instructed on wound care with gentle cleansing and use of vaseline ointment to keep moist until healed. The patient is supplied a handout on cryosurgery and is instructed to call if lesions do not completely resolve.    Screening for malignant neoplasm of skin  upper body skin examination performed today including at least 6 points as noted in physical examination. No lesions suspicious for malignancy noted.  Reassurance  provided.    Instructed patient to observe lesion(s) for changes and follow up in clinic if changes are noted. Patient to monitor skin at home for new or changing lesions and follow up in clinic if noted.    Discussed ABCDE's of moles and brochure provided.             Follow up if symptoms worsen or fail to improve.

## 2024-01-05 ENCOUNTER — LAB VISIT (OUTPATIENT)
Dept: LAB | Facility: HOSPITAL | Age: 59
End: 2024-01-05
Attending: UROLOGY
Payer: COMMERCIAL

## 2024-01-05 DIAGNOSIS — R97.20 ELEVATED PSA: ICD-10-CM

## 2024-01-05 LAB — COMPLEXED PSA SERPL-MCNC: 4.6 NG/ML (ref 0–4)

## 2024-01-05 PROCEDURE — 36415 COLL VENOUS BLD VENIPUNCTURE: CPT | Performed by: UROLOGY

## 2024-01-05 PROCEDURE — 84153 ASSAY OF PSA TOTAL: CPT | Performed by: UROLOGY

## 2024-01-09 ENCOUNTER — OFFICE VISIT (OUTPATIENT)
Dept: UROLOGY | Facility: CLINIC | Age: 59
End: 2024-01-09
Payer: COMMERCIAL

## 2024-01-09 VITALS
BODY MASS INDEX: 29.42 KG/M2 | HEIGHT: 65 IN | HEART RATE: 61 BPM | DIASTOLIC BLOOD PRESSURE: 71 MMHG | WEIGHT: 176.56 LBS | SYSTOLIC BLOOD PRESSURE: 159 MMHG

## 2024-01-09 DIAGNOSIS — R97.20 ELEVATED PSA: ICD-10-CM

## 2024-01-09 DIAGNOSIS — N13.8 BPH WITH OBSTRUCTION/LOWER URINARY TRACT SYMPTOMS: Primary | ICD-10-CM

## 2024-01-09 DIAGNOSIS — N40.1 BPH WITH OBSTRUCTION/LOWER URINARY TRACT SYMPTOMS: Primary | ICD-10-CM

## 2024-01-09 LAB
BILIRUB UR QL STRIP: NEGATIVE
GLUCOSE UR QL STRIP: NEGATIVE
KETONES UR QL STRIP: NEGATIVE
LEUKOCYTE ESTERASE UR QL STRIP: NEGATIVE
PH, POC UA: 7
POC BLOOD, URINE: NEGATIVE
POC NITRATES, URINE: NEGATIVE
POC RESIDUAL URINE VOLUME: 74 ML (ref 0–100)
PROT UR QL STRIP: NEGATIVE
SP GR UR STRIP: 1.01 (ref 1–1.03)
UROBILINOGEN UR STRIP-ACNC: 0.2 (ref 0.3–2.2)

## 2024-01-09 PROCEDURE — 99999 PR PBB SHADOW E&M-EST. PATIENT-LVL III: CPT | Mod: PBBFAC,,, | Performed by: UROLOGY

## 2024-01-09 PROCEDURE — 81003 URINALYSIS AUTO W/O SCOPE: CPT | Mod: QW,S$GLB,, | Performed by: UROLOGY

## 2024-01-09 PROCEDURE — 99213 OFFICE O/P EST LOW 20 MIN: CPT | Mod: S$GLB,,, | Performed by: UROLOGY

## 2024-01-09 PROCEDURE — 51798 US URINE CAPACITY MEASURE: CPT | Mod: S$GLB,,, | Performed by: UROLOGY

## 2024-01-09 NOTE — PROGRESS NOTES
Chief Complaint   Patient presents with    Follow-up     Patient is here for 6 month follow up with PSA review. Patient complains of left pelvic pain that exceeds to the groin area.           History of Present Illness:   Vinny Stover is a 58 y.o. male here for evaluation of Follow-up (Patient is here for 6 month follow up with PSA review. Patient complains of left pelvic pain that exceeds to the groin area.)    1/9/24- Pt has run 2 1/2 marathons and is really improving his diet. Pt has some urinary frequency and urgency, but reports that he drinks a lot of coffee. Stream is good. No hesitancy or stranguria. Pt would like to stay off of medication as long as he can.   7/5/23- Never took Alfuzosin because he didn't want to take medication if he didn't have to. Walking a lot more. Nocturia is improved, now to 1-2. Still has some urgency. Stream is good. No dysuria. Had some slight pain in his left flank the other day after running 6 miles for the first time. Has a h/o stones, but none for years.   12/19/22- here to further discuss flomax. It has helped his urination, but has been making him dizzy. He also notes ejaculatory changes. Doesn't really like to take medication.   12/13/22-TRUS biopsy pathology benign with chronic inflammation. He does have urinary frequency and urgency. He has a good stream. No hematuria at this point.   11/28/22-here for TRUS biopsy. 60g prostate  11/7/22- PSA up a little. No stranguria or dysuria. He does have frequency. Nocturia x 3. Drinks a lot of coffee.   8/5/22- MRI read as PI RADS 2. Prostate measures at 61cc. No stranguria, gross hematuria. He does have frequency. PSA did decrease after last visit to 5.3.   7/5/22- 58yo male here for evaluation of elevated PSA of 7.7. Pt denies any h/o elevated PSA. Did have prostatitis 5-6 years ago and was given antibiotics at that time. Reports having urinary frequency. Does drink a lot of coffee. Nocturia x 2. No gross hematuria or  dysuria. Stream is strong.   No family h/o CaP.       Review of Systems   Constitutional:  Negative for chills and fever.   Respiratory:  Negative for shortness of breath.    Cardiovascular:  Negative for chest pain.   Genitourinary:  Negative for dysuria and hematuria.   All other systems reviewed and are negative.      Past Medical History:   Diagnosis Date    Aphthous stomatitis 12/26/2019    Arthritis     Colon polyp     Gastroesophageal reflux disease without esophagitis 02/28/2018    Obstructive sleep apnea syndrome 12/26/2019       Past Surgical History:   Procedure Laterality Date    COLONOSCOPY  2015    NOSE SURGERY         Family History   Problem Relation Age of Onset    Drug abuse Mother     No Known Problems Father     Colon cancer Paternal Grandfather        Social History     Tobacco Use    Smoking status: Never     Passive exposure: Never    Smokeless tobacco: Never   Substance Use Topics    Alcohol use: No    Drug use: No       No current outpatient medications on file.     No current facility-administered medications for this visit.       Review of patient's allergies indicates:  No Known Allergies    Physical Exam  Vitals:    01/09/24 0902   BP: (!) 159/71   Pulse: 61               General: Well-developed, well-nourished in no acute distress  HEENT: Normocephalic, atraumatic, Extraocular movements intact  Neck: supple, trachea midline, no cervical or supraclavicular lymphadenopathy  Respirations: even and unlabored  Rectal: 7/5/23-30g prostate, no nodules or tenderness. No gross blood  Extremities: atraumatic, moves all equally, no clubbing, cyanosis or edema  Psych: normal affect  Skin: warm and dry, no lesions  Neuro: Alert and oriented, Cranial nerves II-XII grossly intact    UA: negative for Blood, LE, nit    PVR: 74cc    PSA  3/26/19: 3.0  6/20/22: 7.7  7/8/22: 5.3  8/5/22: 4.1  11/3/22: 5.1  6/12/23: 4.1  1/5/24: 4.6    Assessment:   1. BPH with obstruction/lower urinary tract symptoms  POCT  Bladder Scan    POCT Urinalysis, Dipstick, Automated, W/O Scope      2. Elevated PSA  Prostate Specific Antigen, Diagnostic                  Plan:  BPH with obstruction/lower urinary tract symptoms  -     POCT Bladder Scan  -     POCT Urinalysis, Dipstick, Automated, W/O Scope    Elevated PSA  -     Prostate Specific Antigen, Diagnostic; Future; Expected date: 07/09/2024        Discussed BPH with LUTS and various management options, pharmacologic and surgical.   Discussed urolift. Will hold off for now, but maybe in the future.       Follow up in about 6 months (around 7/9/2024) for labs before visit.

## 2024-03-25 ENCOUNTER — OFFICE VISIT (OUTPATIENT)
Dept: FAMILY MEDICINE | Facility: CLINIC | Age: 59
End: 2024-03-25
Payer: COMMERCIAL

## 2024-03-25 VITALS
WEIGHT: 177 LBS | BODY MASS INDEX: 29.49 KG/M2 | HEART RATE: 56 BPM | OXYGEN SATURATION: 97 % | HEIGHT: 65 IN | TEMPERATURE: 97 F | DIASTOLIC BLOOD PRESSURE: 68 MMHG | RESPIRATION RATE: 18 BRPM | SYSTOLIC BLOOD PRESSURE: 130 MMHG

## 2024-03-25 DIAGNOSIS — B35.1 ONYCHOMYCOSIS: Primary | ICD-10-CM

## 2024-03-25 PROCEDURE — 99213 OFFICE O/P EST LOW 20 MIN: CPT | Mod: S$GLB,,, | Performed by: FAMILY MEDICINE

## 2024-03-25 PROCEDURE — 99999 PR PBB SHADOW E&M-EST. PATIENT-LVL III: CPT | Mod: PBBFAC,,, | Performed by: FAMILY MEDICINE

## 2024-03-25 NOTE — PROGRESS NOTES
CHIEF COMPLAINT:  This is a 58-year-old male complaining of loss of toenail.    SUBJECTIVE:  The patient reports cracking and loss of outer portion of toenail recently.  He runs for exercise and participates in marathons.  He denies history of injury.   He denies pain, redness or swelling.      ROS:  GENERAL: Patient denies fever, chills, night sweats.  Patient denies weight gain or loss. Patient denies anorexia, fatigue, weakness or swollen glands.  SKIN: Patient denies rash or hair loss.  HEENT: Patient denies sore throat, ear pain, hearing loss, nasal congestion, or runny nose. Patient denies visual disturbance, eye irritation or discharge.  LUNGS: Patient denies cough, wheeze or hemoptysis.  CARDIOVASCULAR: Patient denies chest pain, shortness of breath, palpitations, syncope or lower extremity edema.  GI: Patient denies abdominal pain, nausea, vomiting, diarrhea, constipation, blood in stool or melena.  MUSCULOSKELETAL: Patient denies joint pain, swelling, redness or warmth.  NEUROLOGIC: Patient denies headache, vertigo, paresthesias, weakness in limb, dysarthria, dysphagia or abnormality of gait.    OBJECTIVE:   GENERAL: Well-developed well-nourished Overweight white male alert and oriented x3, in no acute distress.  Memory, judgment and cognition without deficit.   SKIN: Clear without rash.  Normal color and tone.  HEENT: Eyes: Clear conjunctivae. No scleral icterus.    NECK: Supple, normal range of motion.    LUNGS: Normal respiratory effort.  EXTREMITIES: Without cyanosis, clubbing or edema.  Onychomycosis and distortion of right 5th toenail.  Is signs of infection.  Distal pulses 2+ and equal.  Normal range of motion in all extremities.  No joint effusion, erythema or warmth.  NEUROLOGIC:  Gait without abnormality.  No tremor.    ASSESSMENT:  1. Onychomycosis      PLAN:   1. Discussed pathophysiology of onychomycosis and treatment options including oral antifungals.  2. Reassurance regarding non serious  nature of problem.    3. May try topical OTC antifungals or Vicks Vaporub.  4. Follow-up as needed.      20 minutes of total time spent on the encounter, which includes face to face time and non-face to face time preparing to see the patient (eg, review of tests), Obtaining and/or reviewing separately obtained history, Documenting clinical information in the electronic or other health record, Independently interpreting results (not separately reported) and communicating results to the patient/family/caregiver, or Care coordination (not separately reported).     This note is generated with speech recognition software and is subject to transcription error and sound alike phrases that may be missed by proofreading.

## 2024-07-01 ENCOUNTER — LAB VISIT (OUTPATIENT)
Dept: LAB | Facility: HOSPITAL | Age: 59
End: 2024-07-01
Attending: UROLOGY
Payer: COMMERCIAL

## 2024-07-01 DIAGNOSIS — R97.20 ELEVATED PSA: ICD-10-CM

## 2024-07-01 LAB — COMPLEXED PSA SERPL-MCNC: 3.9 NG/ML (ref 0–4)

## 2024-07-01 PROCEDURE — 84153 ASSAY OF PSA TOTAL: CPT | Performed by: UROLOGY

## 2024-07-01 PROCEDURE — 36415 COLL VENOUS BLD VENIPUNCTURE: CPT | Performed by: UROLOGY

## 2024-07-09 ENCOUNTER — OFFICE VISIT (OUTPATIENT)
Dept: UROLOGY | Facility: CLINIC | Age: 59
End: 2024-07-09
Payer: COMMERCIAL

## 2024-07-09 VITALS
RESPIRATION RATE: 18 BRPM | WEIGHT: 175.69 LBS | HEART RATE: 74 BPM | BODY MASS INDEX: 29.27 KG/M2 | HEIGHT: 65 IN | DIASTOLIC BLOOD PRESSURE: 80 MMHG | SYSTOLIC BLOOD PRESSURE: 133 MMHG

## 2024-07-09 DIAGNOSIS — R97.20 ELEVATED PSA: Primary | ICD-10-CM

## 2024-07-09 DIAGNOSIS — N40.1 BPH WITH OBSTRUCTION/LOWER URINARY TRACT SYMPTOMS: ICD-10-CM

## 2024-07-09 DIAGNOSIS — N13.8 BPH WITH OBSTRUCTION/LOWER URINARY TRACT SYMPTOMS: ICD-10-CM

## 2024-07-09 LAB
BILIRUB UR QL STRIP: NEGATIVE
GLUCOSE UR QL STRIP: NEGATIVE
KETONES UR QL STRIP: NEGATIVE
LEUKOCYTE ESTERASE UR QL STRIP: NEGATIVE
PH, POC UA: 7
POC BLOOD, URINE: NEGATIVE
POC NITRATES, URINE: NEGATIVE
PROT UR QL STRIP: NEGATIVE
SP GR UR STRIP: 1 (ref 1–1.03)
UROBILINOGEN UR STRIP-ACNC: 0.2 (ref 0.3–2.2)

## 2024-07-09 PROCEDURE — 99999 PR PBB SHADOW E&M-EST. PATIENT-LVL III: CPT | Mod: PBBFAC,,, | Performed by: UROLOGY

## 2024-07-09 PROCEDURE — 99213 OFFICE O/P EST LOW 20 MIN: CPT | Mod: S$GLB,,, | Performed by: UROLOGY

## 2024-07-09 PROCEDURE — 81003 URINALYSIS AUTO W/O SCOPE: CPT | Mod: QW,S$GLB,, | Performed by: UROLOGY

## 2024-07-09 NOTE — PROGRESS NOTES
Chief Complaint   Patient presents with    Follow-up           History of Present Illness:   Vinny Stover is a 59 y.o. male here for evaluation of Follow-up    7/9/24-nocturia has improved to 2. Still with urinary frequency but drinks a lot of coffee. Has run 3 more 1/2 marathons. Has a health  for dietary needs. Stream is good. No gross hematuria.   1/9/24- Pt has run 2 1/2 marathons and is really improving his diet. Pt has some urinary frequency and urgency, but reports that he drinks a lot of coffee. Stream is good. No hesitancy or stranguria. Pt would like to stay off of medication as long as he can.   7/5/23- Never took Alfuzosin because he didn't want to take medication if he didn't have to. Walking a lot more. Nocturia is improved, now to 1-2. Still has some urgency. Stream is good. No dysuria. Had some slight pain in his left flank the other day after running 6 miles for the first time. Has a h/o stones, but none for years.   12/19/22- here to further discuss flomax. It has helped his urination, but has been making him dizzy. He also notes ejaculatory changes. Doesn't really like to take medication.   12/13/22-TRUS biopsy pathology benign with chronic inflammation. He does have urinary frequency and urgency. He has a good stream. No hematuria at this point.   11/28/22-here for TRUS biopsy. 60g prostate  11/7/22- PSA up a little. No stranguria or dysuria. He does have frequency. Nocturia x 3. Drinks a lot of coffee.   8/5/22- MRI read as PI RADS 2. Prostate measures at 61cc. No stranguria, gross hematuria. He does have frequency. PSA did decrease after last visit to 5.3.   7/5/22- 58yo male here for evaluation of elevated PSA of 7.7. Pt denies any h/o elevated PSA. Did have prostatitis 5-6 years ago and was given antibiotics at that time. Reports having urinary frequency. Does drink a lot of coffee. Nocturia x 2. No gross hematuria or dysuria. Stream is strong.   No family h/o CaP.       Review  of Systems   Constitutional:  Negative for chills and fever.   Respiratory:  Negative for shortness of breath.    Cardiovascular:  Negative for chest pain.   Genitourinary:  Negative for dysuria and hematuria.   All other systems reviewed and are negative.      Past Medical History:   Diagnosis Date    Aphthous stomatitis 12/26/2019    Arthritis     Colon polyp     Gastroesophageal reflux disease without esophagitis 02/28/2018    Obstructive sleep apnea syndrome 12/26/2019       Past Surgical History:   Procedure Laterality Date    COLONOSCOPY  2015    NOSE SURGERY         Family History   Problem Relation Name Age of Onset    Drug abuse Mother      No Known Problems Father      Colon cancer Paternal Grandfather         Social History     Tobacco Use    Smoking status: Never     Passive exposure: Never    Smokeless tobacco: Never   Substance Use Topics    Alcohol use: No    Drug use: No       No current outpatient medications on file.     No current facility-administered medications for this visit.       Review of patient's allergies indicates:  No Known Allergies    Physical Exam  Vitals:    07/09/24 0859   BP: 133/80   Pulse: 74   Resp: 18               General: Well-developed, well-nourished in no acute distress  HEENT: Normocephalic, atraumatic, Extraocular movements intact  Neck: supple, trachea midline, no cervical or supraclavicular lymphadenopathy  Respirations: even and unlabored  Rectal: 7/9/24-60g prostate, no nodules or tenderness. No gross blood  Extremities: atraumatic, moves all equally, no clubbing, cyanosis or edema  Psych: normal affect  Skin: warm and dry, no lesions  Neuro: Alert and oriented, Cranial nerves II-XII grossly intact    UA: negative for Blood, LE, nit      PSA  3/26/19: 3.0  6/20/22: 7.7  7/8/22: 5.3  8/5/22: 4.1  11/3/22: 5.1  6/12/23: 4.1  1/5/24: 4.6  7/1/24: 3.9    Assessment:   1. Elevated PSA  POCT Urinalysis, Dipstick, Automated, W/O Scope    Prostate Specific Antigen,  Diagnostic                  Plan:  Elevated PSA  -     POCT Urinalysis, Dipstick, Automated, W/O Scope  -     Prostate Specific Antigen, Diagnostic; Future; Expected date: 01/09/2025          Follow up in about 6 months (around 1/9/2025).

## 2024-07-14 ENCOUNTER — OFFICE VISIT (OUTPATIENT)
Dept: URGENT CARE | Facility: CLINIC | Age: 59
End: 2024-07-14
Payer: COMMERCIAL

## 2024-07-14 VITALS
HEART RATE: 68 BPM | WEIGHT: 177.38 LBS | HEIGHT: 65 IN | BODY MASS INDEX: 29.55 KG/M2 | DIASTOLIC BLOOD PRESSURE: 60 MMHG | OXYGEN SATURATION: 96 % | SYSTOLIC BLOOD PRESSURE: 110 MMHG | TEMPERATURE: 98 F | RESPIRATION RATE: 18 BRPM

## 2024-07-14 DIAGNOSIS — R05.9 COUGH, UNSPECIFIED TYPE: ICD-10-CM

## 2024-07-14 DIAGNOSIS — R09.82 POSTNASAL DRIP: ICD-10-CM

## 2024-07-14 DIAGNOSIS — U07.1 COVID-19: Primary | ICD-10-CM

## 2024-07-14 LAB
CTP QC/QA: YES
SARS-COV-2 AG RESP QL IA.RAPID: POSITIVE

## 2024-07-14 PROCEDURE — 87811 SARS-COV-2 COVID19 W/OPTIC: CPT | Mod: QW,S$GLB,,

## 2024-07-14 PROCEDURE — 99214 OFFICE O/P EST MOD 30 MIN: CPT | Mod: S$GLB,,,

## 2024-07-14 RX ORDER — BENZONATATE 200 MG/1
200 CAPSULE ORAL 3 TIMES DAILY PRN
Qty: 30 CAPSULE | Refills: 0 | Status: SHIPPED | OUTPATIENT
Start: 2024-07-14 | End: 2024-07-24

## 2024-07-14 RX ORDER — NIRMATRELVIR AND RITONAVIR 300-100 MG
KIT ORAL
Qty: 30 TABLET | Refills: 0 | Status: SHIPPED | OUTPATIENT
Start: 2024-07-14 | End: 2024-07-19

## 2024-07-14 RX ORDER — CETIRIZINE HYDROCHLORIDE 10 MG/1
10 TABLET ORAL DAILY
Qty: 10 TABLET | Refills: 0 | Status: SHIPPED | OUTPATIENT
Start: 2024-07-14 | End: 2024-07-24

## 2024-07-14 RX ORDER — FLUTICASONE PROPIONATE 50 MCG
1 SPRAY, SUSPENSION (ML) NASAL DAILY
Qty: 16 ML | Refills: 0 | Status: SHIPPED | OUTPATIENT
Start: 2024-07-14 | End: 2024-07-21

## 2024-07-14 NOTE — PATIENT INSTRUCTIONS
Informed pt of positive covid results. Pt instructed to stay home and self quarantine until 5 days from onset of symptoms, and asymptomatic for at least 72 hours prior to resuming normal activity. Work excuse will be provided. Strict ED precautions given for any emergent symptoms.

## 2024-07-14 NOTE — PROGRESS NOTES
"Subjective:      Patient ID: Vinny Stover is a 59 y.o. male.    Vitals:  height is 5' 5.32" (1.659 m) and weight is 80.4 kg (177 lb 5.8 oz). His oral temperature is 98.4 °F (36.9 °C). His blood pressure is 110/60 and his pulse is 68. His respiration is 18 and oxygen saturation is 96%.     Chief Complaint: Cough (Headache for 3 days)    Patient presents today with cough, congestion and headache starting 3 days ago. He has not had any fever.    Cough  This is a new problem. The current episode started in the past 7 days. The problem has been unchanged. The problem occurs constantly. The cough is Non-productive. Associated symptoms include headaches, nasal congestion and a sore throat. Pertinent negatives include no chest pain, chills, fever, rash or shortness of breath. Treatments tried: robitussin. The treatment provided mild relief.       Constitution: Negative for chills and fever.   HENT:  Positive for congestion and sore throat.    Cardiovascular:  Negative for chest pain and palpitations.   Respiratory:  Positive for cough. Negative for shortness of breath.    Gastrointestinal:  Negative for nausea and vomiting.   Musculoskeletal:  Negative for pain.   Skin:  Negative for rash.   Neurological:  Positive for headaches.      Objective:     Physical Exam   Constitutional: He is oriented to person, place, and time. normal  HENT:   Head: Normocephalic.   Ears:   Right Ear: Tympanic membrane, external ear and ear canal normal.   Left Ear: Tympanic membrane, external ear and ear canal normal.   Nose: Nose normal.   Mouth/Throat: Mucous membranes are moist.      Comments: Postnasal drainage, clear   Eyes: Pupils are equal, round, and reactive to light. Extraocular movement intact   Neck: Neck supple.   Cardiovascular: Normal rate, regular rhythm, normal heart sounds and normal pulses.   Pulmonary/Chest: Effort normal and breath sounds normal.   Abdominal: Normal appearance. There is no abdominal tenderness. "   Musculoskeletal: Normal range of motion.         General: Normal range of motion.   Neurological: He is alert and oriented to person, place, and time.   Skin: Skin is warm and dry. Capillary refill takes less than 2 seconds.   Psychiatric: Mood normal.       Assessment:     1. COVID-19    2. Cough, unspecified type    3. Postnasal drip          Plan:     Patient Instructions   Informed pt of positive covid results. Pt instructed to stay home and self quarantine until 5 days from onset of symptoms, and asymptomatic for at least 72 hours prior to resuming normal activity. Work excuse will be provided. Strict ED precautions given for any emergent symptoms.      COVID-19  -     nirmatrelvir-ritonavir (PAXLOVID) 300 mg (150 mg x 2)-100 mg copackaged tablets (EUA); Take 3 tablets by mouth 2 (two) times daily. Each dose contains 2 nirmatrelvir (pink tablets) and 1 ritonavir (white tablet). Take all 3 tablets together  Dispense: 30 tablet; Refill: 0    Cough, unspecified type  -     SARS Coronavirus 2 Antigen, POCT Manual Read  -     benzonatate (TESSALON) 200 MG capsule; Take 1 capsule (200 mg total) by mouth 3 (three) times daily as needed for Cough.  Dispense: 30 capsule; Refill: 0    Postnasal drip  -     cetirizine (ZYRTEC) 10 MG tablet; Take 1 tablet (10 mg total) by mouth once daily. for 10 days  Dispense: 10 tablet; Refill: 0  -     fluticasone propionate (FLONASE) 50 mcg/actuation nasal spray; 1 spray (50 mcg total) by Each Nostril route once daily. for 7 days  Dispense: 16 mL; Refill: 0          Medical Decision Making:   Urgent Care Management:  Positive covid- discussed w/pt

## 2024-07-14 NOTE — LETTER
July 14, 2024      [facility]  [address]  [facility] [address]  Phone: [phone]       Patient: Vinny Stover   YOB: 1965  Date of Visit: 07/14/2024    To Whom It May Concern:    Anibal Stover  was at [facility] on 07/14/2024. The patient may return to work/school on 07/17/24 with no restrictions. If you have any questions or concerns, or if I can be of further assistance, please do not hesitate to contact me.    Sincerely,    Mara Velazquez NP

## 2024-09-18 ENCOUNTER — TELEPHONE (OUTPATIENT)
Dept: PRIMARY CARE CLINIC | Facility: CLINIC | Age: 59
End: 2024-09-18
Payer: COMMERCIAL

## 2024-09-18 DIAGNOSIS — R73.03 PREDIABETES: ICD-10-CM

## 2024-09-18 DIAGNOSIS — Z00.00 ROUTINE ADULT HEALTH MAINTENANCE: Primary | ICD-10-CM

## 2024-09-18 DIAGNOSIS — R73.03 PRE-DIABETES: ICD-10-CM

## 2024-10-07 ENCOUNTER — OFFICE VISIT (OUTPATIENT)
Dept: PRIMARY CARE CLINIC | Facility: CLINIC | Age: 59
End: 2024-10-07
Payer: COMMERCIAL

## 2024-10-07 VITALS
DIASTOLIC BLOOD PRESSURE: 74 MMHG | WEIGHT: 173.31 LBS | BODY MASS INDEX: 28.56 KG/M2 | OXYGEN SATURATION: 98 % | TEMPERATURE: 97 F | SYSTOLIC BLOOD PRESSURE: 132 MMHG | HEART RATE: 71 BPM

## 2024-10-07 DIAGNOSIS — Z00.00 ANNUAL PHYSICAL EXAM: Primary | ICD-10-CM

## 2024-10-07 DIAGNOSIS — R73.03 PREDIABETES: ICD-10-CM

## 2024-10-07 DIAGNOSIS — Z23 FLU VACCINE NEED: ICD-10-CM

## 2024-10-07 PROCEDURE — 99396 PREV VISIT EST AGE 40-64: CPT | Mod: 25,S$GLB,, | Performed by: INTERNAL MEDICINE

## 2024-10-07 PROCEDURE — 90471 IMMUNIZATION ADMIN: CPT | Mod: S$GLB,,, | Performed by: INTERNAL MEDICINE

## 2024-10-07 PROCEDURE — 90656 IIV3 VACC NO PRSV 0.5 ML IM: CPT | Mod: S$GLB,,, | Performed by: INTERNAL MEDICINE

## 2024-10-07 PROCEDURE — 99999 PR PBB SHADOW E&M-EST. PATIENT-LVL III: CPT | Mod: PBBFAC,,, | Performed by: INTERNAL MEDICINE

## 2024-10-07 NOTE — PROGRESS NOTES
Subjective     Patient ID: Vinny Stover is a 59 y.o. male.    Chief Complaint: Annual Exam      HPI  here for annual.  Labs r/w pt in detail.  Running a lot.      Recent Results (from the past 8 weeks)   Comprehensive Metabolic Panel    Collection Time: 09/27/24  1:53 PM   Result Value Ref Range    Glucose 73 70 - 99 mg/dL    BUN 11 6 - 24 mg/dL    Creatinine 1.04 0.76 - 1.27 mg/dL    eGFR 83 >59 mL/min/1.73    BUN/Creatinine Ratio 11 9 - 20    Sodium 142 134 - 144 mmol/L    Potassium 4.6 3.5 - 5.2 mmol/L    Chloride 103 96 - 106 mmol/L    CO2 24 20 - 29 mmol/L    Calcium 9.6 8.7 - 10.2 mg/dL    Protein, Total 6.5 6.0 - 8.5 g/dL    Albumin 4.3 3.8 - 4.9 g/dL    Globulin, Total 2.2 1.5 - 4.5 g/dL    Total Bilirubin 0.5 0.0 - 1.2 mg/dL    Alkaline Phosphatase 90 44 - 121 IU/L    AST 20 0 - 40 IU/L    ALT 21 0 - 44 IU/L   CBC Auto Differential    Collection Time: 09/27/24  1:53 PM   Result Value Ref Range    WBC 6.2 3.4 - 10.8 x10E3/uL    RBC 4.60 4.14 - 5.80 x10E6/uL    Hemoglobin 15.1 13.0 - 17.7 g/dL    Hematocrit 44.6 37.5 - 51.0 %    MCV 97 79 - 97 fL    MCH 32.8 26.6 - 33.0 pg    MCHC 33.9 31.5 - 35.7 g/dL    RDW 13.1 11.6 - 15.4 %    Platelets 238 150 - 450 x10E3/uL    Neutrophils 51 Not Estab. %    Lymphs 33 Not Estab. %    Monocytes 12 Not Estab. %    Eos 3 Not Estab. %    Basos 1 Not Estab. %    Neutrophils (Absolute) 3.2 1.4 - 7.0 x10E3/uL    Lymphs (Absolute) 2.1 0.7 - 3.1 x10E3/uL    Monocytes(Absolute) 0.7 0.1 - 0.9 x10E3/uL    Eos (Absolute) 0.2 0.0 - 0.4 x10E3/uL    Baso (Absolute) 0.0 0.0 - 0.2 x10E3/uL    Immature Granulocytes 0 Not Estab. %    Immature Grans (Abs) 0.0 0.0 - 0.1 x10E3/uL   Lipid Panel    Collection Time: 09/27/24  1:53 PM   Result Value Ref Range    Cholesterol 156 100 - 199 mg/dL    Triglycerides 162 (H) 0 - 149 mg/dL    HDL 47 >39 mg/dL    VLDL Cholesterol Josep 28 5 - 40 mg/dL    LDL Calculated 81 0 - 99 mg/dL   TSH    Collection Time: 09/27/24  1:53 PM   Result Value Ref Range     TSH 3.640 0.450 - 4.500 uIU/mL   Hemoglobin A1C    Collection Time: 09/27/24  1:53 PM   Result Value Ref Range    Hemoglobin A1c 5.7 (H) 4.8 - 5.6 %   ]    Past Medical History:   Diagnosis Date    Aphthous stomatitis 12/26/2019    Arthritis     Colon polyp     Gastroesophageal reflux disease without esophagitis 02/28/2018    Obstructive sleep apnea syndrome 12/26/2019     Review of patient's allergies indicates:  No Known Allergies  Past Surgical History:   Procedure Laterality Date    COLONOSCOPY  2015    NOSE SURGERY       Family History   Problem Relation Name Age of Onset    Drug abuse Mother      No Known Problems Father      Colon cancer Paternal Grandfather       Social History     Socioeconomic History    Marital status:    Occupational History     Employer: other   Tobacco Use    Smoking status: Never     Passive exposure: Never    Smokeless tobacco: Never   Substance and Sexual Activity    Alcohol use: No    Drug use: No    Sexual activity: Yes     Partners: Female     Social Drivers of Health     Financial Resource Strain: Low Risk  (10/6/2024)    Overall Financial Resource Strain (CARDIA)     Difficulty of Paying Living Expenses: Not hard at all   Food Insecurity: No Food Insecurity (10/6/2024)    Hunger Vital Sign     Worried About Running Out of Food in the Last Year: Never true     Ran Out of Food in the Last Year: Never true   Transportation Needs: No Transportation Needs (10/19/2021)    PRAPARE - Transportation     Lack of Transportation (Medical): No     Lack of Transportation (Non-Medical): No   Physical Activity: Sufficiently Active (10/6/2024)    Exercise Vital Sign     Days of Exercise per Week: 6 days     Minutes of Exercise per Session: 60 min   Stress: No Stress Concern Present (10/6/2024)    [employer] - Occupational Stress Questionnaire     Feeling of Stress : Not at all   Housing Stability: Unknown (10/19/2021)    Housing Stability Vital Sign      Unable to Pay for Housing in the Last Year: No     Unstable Housing in the Last Year: No         /74 (BP Location: Right arm)   Pulse 71   Temp 97.3 °F (36.3 °C) (Tympanic)   Wt 78.6 kg (173 lb 4.5 oz)   SpO2 98%   BMI 28.56 kg/m²   No current outpatient medications on file as of 10/7/2024.     Facility-Administered Medications as of 10/7/2024   Medication Dose Route Frequency Provider Last Rate Last Admin    [COMPLETED] influenza (Flulaval, Fluzone, Fluarix) 45 mcg/0.5 mL IM vaccine (> or = 6 mo) 0.5 mL  0.5 mL Intramuscular 1 time in Clinic/HOD Ruby Kwok MD   0.5 mL at 10/07/24 0748       Review of Systems   All other systems reviewed and are negative.         Objective     Physical Exam  Constitutional:       General: He is not in acute distress.     Appearance: Normal appearance. He is not ill-appearing, toxic-appearing or diaphoretic.   HENT:      Head: Normocephalic and atraumatic.      Nose: Nose normal.      Mouth/Throat:      Mouth: Mucous membranes are moist.      Pharynx: Oropharynx is clear. No oropharyngeal exudate or posterior oropharyngeal erythema.   Eyes:      General: No scleral icterus.     Extraocular Movements: Extraocular movements intact.      Conjunctiva/sclera: Conjunctivae normal.   Neck:      Thyroid: No thyromegaly.      Vascular: No carotid bruit.   Cardiovascular:      Rate and Rhythm: Normal rate and regular rhythm.      Heart sounds: Normal heart sounds. No murmur heard.     No friction rub. No gallop.   Pulmonary:      Effort: Pulmonary effort is normal. No respiratory distress.      Breath sounds: Normal breath sounds. No wheezing or rales.   Abdominal:      General: Bowel sounds are normal. There is no distension.      Palpations: Abdomen is soft. There is no mass.      Tenderness: There is no abdominal tenderness. There is no right CVA tenderness, left CVA tenderness, guarding or rebound.   Musculoskeletal:      Cervical back: Neck supple.      Right lower  leg: No edema.      Left lower leg: No edema.   Lymphadenopathy:      Head:      Right side of head: No submental, submandibular, preauricular, posterior auricular or occipital adenopathy.      Left side of head: No submental, submandibular, preauricular, posterior auricular or occipital adenopathy.      Cervical: No cervical adenopathy.      Upper Body:      Right upper body: No supraclavicular adenopathy.      Left upper body: No supraclavicular adenopathy.   Skin:     General: Skin is warm and dry.      Capillary Refill: Capillary refill takes less than 2 seconds.   Neurological:      General: No focal deficit present.      Mental Status: He is alert.   Psychiatric:         Mood and Affect: Mood normal.         Behavior: Behavior normal.            Assessment and Plan     1. Annual physical exam    2. Flu vaccine need  -     influenza (Flulaval, Fluzone, Fluarix) 45 mcg/0.5 mL IM vaccine (> or = 6 mo) 0.5 mL    3. Prediabetes         Follow up in about 1 year (around 10/7/2025) for annual.    Immunization History   Administered Date(s) Administered    COVID-19 MRNA, LN-S PF (MODERNA HALF 0.25 ML DOSE) 12/21/2021    COVID-19 Vaccine 03/13/2021, 04/14/2021, 12/21/2021    COVID-19, MRNA, LN-S, PF (MODERNA FULL 0.5 ML DOSE) 03/13/2021, 04/14/2021    Hepatitis B, Adult 06/06/2006    Influenza 10/06/2008    Influenza - Quadrivalent - PF *Preferred* (6 months and older) 10/06/2008, 03/25/2019, 10/22/2019, 10/03/2020    Influenza - Trivalent - Fluarix, Flulaval, Fluzone, Afluria - PF 10/07/2024

## 2024-12-30 ENCOUNTER — LAB VISIT (OUTPATIENT)
Dept: LAB | Facility: HOSPITAL | Age: 59
End: 2024-12-30
Attending: UROLOGY
Payer: COMMERCIAL

## 2024-12-30 DIAGNOSIS — R97.20 ELEVATED PSA: ICD-10-CM

## 2024-12-30 LAB — COMPLEXED PSA SERPL-MCNC: 7.4 NG/ML (ref 0–4)

## 2024-12-30 PROCEDURE — 36415 COLL VENOUS BLD VENIPUNCTURE: CPT | Performed by: UROLOGY

## 2024-12-30 PROCEDURE — 84153 ASSAY OF PSA TOTAL: CPT | Performed by: UROLOGY

## 2025-01-03 ENCOUNTER — OFFICE VISIT (OUTPATIENT)
Dept: UROLOGY | Facility: CLINIC | Age: 60
End: 2025-01-03
Payer: COMMERCIAL

## 2025-01-03 VITALS
BODY MASS INDEX: 29.42 KG/M2 | WEIGHT: 176.56 LBS | HEIGHT: 65 IN | RESPIRATION RATE: 18 BRPM | DIASTOLIC BLOOD PRESSURE: 74 MMHG | HEART RATE: 59 BPM | SYSTOLIC BLOOD PRESSURE: 143 MMHG

## 2025-01-03 DIAGNOSIS — N13.8 BPH WITH OBSTRUCTION/LOWER URINARY TRACT SYMPTOMS: ICD-10-CM

## 2025-01-03 DIAGNOSIS — N41.1 CHRONIC PROSTATITIS: ICD-10-CM

## 2025-01-03 DIAGNOSIS — R97.20 ELEVATED PSA: Primary | ICD-10-CM

## 2025-01-03 DIAGNOSIS — N40.1 BPH WITH OBSTRUCTION/LOWER URINARY TRACT SYMPTOMS: ICD-10-CM

## 2025-01-03 LAB
BILIRUB UR QL STRIP: NEGATIVE
GLUCOSE UR QL STRIP: NEGATIVE
KETONES UR QL STRIP: NEGATIVE
LEUKOCYTE ESTERASE UR QL STRIP: POSITIVE
PH, POC UA: 6
POC BLOOD, URINE: NEGATIVE
POC NITRATES, URINE: NEGATIVE
PROT UR QL STRIP: NEGATIVE
SP GR UR STRIP: <=1.005 (ref 1–1.03)
UROBILINOGEN UR STRIP-ACNC: 0.2 (ref 0.3–2.2)

## 2025-01-03 PROCEDURE — 99999 PR PBB SHADOW E&M-EST. PATIENT-LVL III: CPT | Mod: PBBFAC,,, | Performed by: UROLOGY

## 2025-01-03 PROCEDURE — 99214 OFFICE O/P EST MOD 30 MIN: CPT | Mod: S$GLB,,, | Performed by: UROLOGY

## 2025-01-03 PROCEDURE — 81003 URINALYSIS AUTO W/O SCOPE: CPT | Mod: QW,S$GLB,, | Performed by: UROLOGY

## 2025-01-03 RX ORDER — SULFAMETHOXAZOLE AND TRIMETHOPRIM 800; 160 MG/1; MG/1
1 TABLET ORAL 2 TIMES DAILY
Qty: 48 TABLET | Refills: 0 | Status: SHIPPED | OUTPATIENT
Start: 2025-01-03 | End: 2025-01-24

## 2025-01-03 NOTE — PROGRESS NOTES
CC: follow up elevated PSA      History of Present Illness:   Vinny Stover is a 59 y.o. male here for follow up.     1/3/25-PSA is back up. Nocturia x 2 with some double voiding during the night. Some feelings of incomplete emptying. Doing great with his running.   7/9/24-nocturia has improved to 2. Still with urinary frequency but drinks a lot of coffee. Has run 3 more 1/2 marathons. Has a health  for dietary needs. Stream is good. No gross hematuria.   1/9/24- Pt has run 2 1/2 marathons and is really improving his diet. Pt has some urinary frequency and urgency, but reports that he drinks a lot of coffee. Stream is good. No hesitancy or stranguria. Pt would like to stay off of medication as long as he can.   7/5/23- Never took Alfuzosin because he didn't want to take medication if he didn't have to. Walking a lot more. Nocturia is improved, now to 1-2. Still has some urgency. Stream is good. No dysuria. Had some slight pain in his left flank the other day after running 6 miles for the first time. Has a h/o stones, but none for years.   12/19/22- here to further discuss flomax. It has helped his urination, but has been making him dizzy. He also notes ejaculatory changes. Doesn't really like to take medication.   12/13/22-TRUS biopsy pathology benign with chronic inflammation. He does have urinary frequency and urgency. He has a good stream. No hematuria at this point.   11/28/22-here for TRUS biopsy. 60g prostate  11/7/22- PSA up a little. No stranguria or dysuria. He does have frequency. Nocturia x 3. Drinks a lot of coffee.   8/5/22- MRI read as PI RADS 2. Prostate measures at 61cc. No stranguria, gross hematuria. He does have frequency. PSA did decrease after last visit to 5.3.   7/5/22- 58yo male here for evaluation of elevated PSA of 7.7. Pt denies any h/o elevated PSA. Did have prostatitis 5-6 years ago and was given antibiotics at that time. Reports having urinary frequency. Does drink a lot of  coffee. Nocturia x 2. No gross hematuria or dysuria. Stream is strong.   No family h/o CaP.       Review of Systems   Constitutional:  Negative for chills and fever.   Respiratory:  Negative for shortness of breath.    Cardiovascular:  Negative for chest pain.   Genitourinary:  Negative for dysuria and hematuria.   All other systems reviewed and are negative.      Past Medical History:   Diagnosis Date    Aphthous stomatitis 12/26/2019    Arthritis     Colon polyp     Gastroesophageal reflux disease without esophagitis 02/28/2018    Obstructive sleep apnea syndrome 12/26/2019       Past Surgical History:   Procedure Laterality Date    COLONOSCOPY  2015    NOSE SURGERY         Family History   Problem Relation Name Age of Onset    Drug abuse Mother      No Known Problems Father      Colon cancer Paternal Grandfather         Social History     Tobacco Use    Smoking status: Never     Passive exposure: Never    Smokeless tobacco: Never   Substance Use Topics    Alcohol use: No    Drug use: No       Current Outpatient Medications   Medication Sig Dispense Refill    alfuzosin (UROXATRAL) 10 mg Tb24 Take 1 tablet (10 mg total) by mouth daily with breakfast. 30 tablet 11    azelastine (ASTELIN) 137 mcg (0.1 %) nasal spray 1 spray (137 mcg total) by Nasal route 2 (two) times daily. 90 mL 0    diazePAM (VALIUM) 10 MG Tab Take 1 po 45 minutes before procedure 1 tablet 0    fluticasone propionate (FLONASE) 50 mcg/actuation nasal spray 1 spray (50 mcg total) by Each Nostril route 2 (two) times daily. 16 g 0     No current facility-administered medications for this visit.       Review of patient's allergies indicates:  No Known Allergies    Physical Exam  Vitals:    01/03/25 1132   BP: (!) 143/74   Pulse: (!) 59   Resp: 18               General: Well-developed, well-nourished in no acute distress  HEENT: Normocephalic, atraumatic, Extraocular movements intact  Neck: supple, trachea midline, no cervical or supraclavicular  lymphadenopathy  Respirations: even and unlabored  Rectal: 7/9/24-60g prostate, no nodules or tenderness. No gross blood  Extremities: atraumatic, moves all equally, no clubbing, cyanosis or edema  Psych: normal affect  Skin: warm and dry, no lesions  Neuro: Alert and oriented, Cranial nerves II-XII grossly intact    UA: trace LE, otherwise negative      PSA  3/26/19: 3.0  6/20/22: 7.7  7/8/22: 5.3  8/5/22: 4.1  11/3/22: 5.1  6/12/23: 4.1  1/5/24: 4.6  7/1/24: 3.9  12/30/24: 7.4    Assessment:   1. Elevated PSA  Basic Metabolic Panel    PROSTATE SPECIFIC ANTIGEN, DIAGNOSTIC    POCT Urinalysis, Dipstick, Automated, W/O Scope      2. Chronic prostatitis        3. BPH with obstruction/lower urinary tract symptoms                      Plan:  Elevated PSA  -     Basic Metabolic Panel; Future; Expected date: 01/27/2025  -     PROSTATE SPECIFIC ANTIGEN, DIAGNOSTIC; Future; Expected date: 02/12/2025  -     POCT Urinalysis, Dipstick, Automated, W/O Scope    Chronic prostatitis    BPH with obstruction/lower urinary tract symptoms    Other orders  -     sulfamethoxazole-trimethoprim 800-160mg (BACTRIM DS) 800-160 mg Tab; Take 1 tablet by mouth 2 (two) times daily. for 21 days  Dispense: 48 tablet; Refill: 0        PSA after completion of bactrim. If doesn't normalize, will repeat 3 months later. If normalizes, will repeat in 6 months.     Follow up in about 6 months (around 7/3/2025).

## 2025-01-17 ENCOUNTER — OFFICE VISIT (OUTPATIENT)
Dept: PRIMARY CARE CLINIC | Facility: CLINIC | Age: 60
End: 2025-01-17
Payer: COMMERCIAL

## 2025-01-17 VITALS
WEIGHT: 177.94 LBS | BODY MASS INDEX: 29.65 KG/M2 | DIASTOLIC BLOOD PRESSURE: 66 MMHG | TEMPERATURE: 99 F | SYSTOLIC BLOOD PRESSURE: 130 MMHG | RESPIRATION RATE: 18 BRPM | HEART RATE: 69 BPM | OXYGEN SATURATION: 97 % | HEIGHT: 65 IN

## 2025-01-17 DIAGNOSIS — H65.93 FLUID LEVEL BEHIND TYMPANIC MEMBRANE OF BOTH EARS: ICD-10-CM

## 2025-01-17 DIAGNOSIS — J01.20 ACUTE NON-RECURRENT ETHMOIDAL SINUSITIS: Primary | ICD-10-CM

## 2025-01-17 DIAGNOSIS — R05.1 ACUTE COUGH: ICD-10-CM

## 2025-01-17 LAB
CTP QC/QA: YES
CTP QC/QA: YES
POC MOLECULAR INFLUENZA A AGN: NEGATIVE
POC MOLECULAR INFLUENZA B AGN: NEGATIVE
SARS-COV-2 RDRP RESP QL NAA+PROBE: NEGATIVE

## 2025-01-17 PROCEDURE — 87502 INFLUENZA DNA AMP PROBE: CPT | Mod: QW,S$GLB,, | Performed by: NURSE PRACTITIONER

## 2025-01-17 PROCEDURE — 96372 THER/PROPH/DIAG INJ SC/IM: CPT | Mod: S$GLB,,, | Performed by: NURSE PRACTITIONER

## 2025-01-17 PROCEDURE — 87635 SARS-COV-2 COVID-19 AMP PRB: CPT | Mod: QW,S$GLB,, | Performed by: NURSE PRACTITIONER

## 2025-01-17 PROCEDURE — 99215 OFFICE O/P EST HI 40 MIN: CPT | Mod: 25,S$GLB,, | Performed by: NURSE PRACTITIONER

## 2025-01-17 PROCEDURE — 99999 PR PBB SHADOW E&M-EST. PATIENT-LVL III: CPT | Mod: PBBFAC,,, | Performed by: NURSE PRACTITIONER

## 2025-01-17 RX ORDER — FLUTICASONE PROPIONATE 50 MCG
1 SPRAY, SUSPENSION (ML) NASAL 2 TIMES DAILY
Qty: 16 G | Refills: 0 | Status: SHIPPED | OUTPATIENT
Start: 2025-01-17

## 2025-01-17 RX ORDER — AZELASTINE 1 MG/ML
1 SPRAY, METERED NASAL 2 TIMES DAILY
Qty: 90 ML | Refills: 0 | Status: SHIPPED | OUTPATIENT
Start: 2025-01-17 | End: 2026-01-17

## 2025-01-17 RX ORDER — DEXAMETHASONE SODIUM PHOSPHATE 4 MG/ML
10 INJECTION, SOLUTION INTRA-ARTICULAR; INTRALESIONAL; INTRAMUSCULAR; INTRAVENOUS; SOFT TISSUE
Status: COMPLETED | OUTPATIENT
Start: 2025-01-17 | End: 2025-01-17

## 2025-01-17 RX ADMIN — DEXAMETHASONE SODIUM PHOSPHATE 10 MG: 4 INJECTION, SOLUTION INTRA-ARTICULAR; INTRALESIONAL; INTRAMUSCULAR; INTRAVENOUS; SOFT TISSUE at 03:01

## 2025-01-17 NOTE — PROGRESS NOTES
Chief Complaint  Chief Complaint   Patient presents with    Nasal Congestion    Generalized Body Aches        History of Present Illness    Mr. Stover presents today for sinus pressure and weakness.    He reports pressure in maxillary sinus area, describing sensation as if someone is pushing against his face. He denies post nasal drip and chest congestion.    He participated in [employer] [place] last weekend with exposure to approximately 17,000 people. He flew on a plane to attend the event and was exposed to rain while waiting in the corrals between 3-4 AM prior to the race.    He has a history of prostate infection. PSA levels recently spiked, prompting urologist to prescribe antibiotics. The urologist recommended delaying the start of antibiotic treatment until today or tomorrow until visiting primary care.    He has a history of pre-diabetes with most recent A1C at 5.7.    He denies any allergies to medications, foods, or environmental substances.      ROS:  General: -fever, -chills, -fatigue, -weight gain, -weight loss  Eyes: -vision changes, -redness, -discharge  ENT: -ear pain, -nasal congestion, -sore throat, +sinus pressure  Cardiovascular: -chest pain, -palpitations, -lower extremity edema  Respiratory: +cough, -shortness of breath, -chest congestion  Gastrointestinal: -abdominal pain, -nausea, -vomiting, -diarrhea, -constipation, -blood in stool  Genitourinary: -dysuria, -hematuria, -frequency  Musculoskeletal: -joint pain, -muscle pain  Skin: -rash, -lesion  Neurological: -headache, -dizziness, -numbness, -tingling, -weakness  Psychiatric: -anxiety, -depression, -sleep difficulty  Allergic: -allergic reactions              PAST MEDICAL HISTORY:  Past Medical History:   Diagnosis Date    Aphthous stomatitis 12/26/2019    Arthritis     Colon polyp     Gastroesophageal reflux disease without esophagitis 02/28/2018    Obstructive sleep apnea syndrome 12/26/2019       PAST SURGICAL HISTORY:  Past Surgical  History:   Procedure Laterality Date    COLONOSCOPY  2015    NOSE SURGERY         SOCIAL HISTORY:  Social History     Socioeconomic History    Marital status:    Occupational History     Employer: other   Tobacco Use    Smoking status: Never     Passive exposure: Never    Smokeless tobacco: Never   Substance and Sexual Activity    Alcohol use: No    Drug use: No    Sexual activity: Yes     Partners: Female     Social Drivers of Health     Financial Resource Strain: Low Risk  (10/6/2024)    Overall Financial Resource Strain (CARDIA)     Difficulty of Paying Living Expenses: Not hard at all   Food Insecurity: No Food Insecurity (10/6/2024)    Hunger Vital Sign     Worried About Running Out of Food in the Last Year: Never true     Ran Out of Food in the Last Year: Never true   Transportation Needs: No Transportation Needs (10/19/2021)    PRAPARE - Transportation     Lack of Transportation (Medical): No     Lack of Transportation (Non-Medical): No   Physical Activity: Sufficiently Active (10/6/2024)    Exercise Vital Sign     Days of Exercise per Week: 6 days     Minutes of Exercise per Session: 60 min   Stress: No Stress Concern Present (10/6/2024)    [place] [employer] - Occupational Stress Questionnaire     Feeling of Stress : Not at all   Housing Stability: Unknown (10/19/2021)    Housing Stability Vital Sign     Unable to Pay for Housing in the Last Year: No     Unstable Housing in the Last Year: No       FAMILY HISTORY:  Family History   Problem Relation Name Age of Onset    Drug abuse Mother      No Known Problems Father      Colon cancer Paternal Grandfather         ALLERGIES AND MEDICATIONS: updated and reviewed.  Review of patient's allergies indicates:  No Known Allergies  Current Outpatient Medications   Medication Sig Dispense Refill    azelastine (ASTELIN) 137 mcg (0.1 %) nasal spray 1 spray (137 mcg total) by Nasal route 2 (two) times daily. 90 mL 0    fluticasone propionate  "(FLONASE) 50 mcg/actuation nasal spray 1 spray (50 mcg total) by Each Nostril route 2 (two) times daily. 16 g 0    sulfamethoxazole-trimethoprim 800-160mg (BACTRIM DS) 800-160 mg Tab Take 1 tablet by mouth 2 (two) times daily. for 21 days 48 tablet 0     No current facility-administered medications for this visit.           Physical Exam  Constitutional:       Appearance: Normal appearance.   HENT:      Head: Normocephalic and atraumatic.      Right Ear: Tympanic membrane normal.      Left Ear: Tympanic membrane normal.      Nose: Nasal tenderness and congestion present.      Right Sinus: Maxillary sinus tenderness present.      Left Sinus: Maxillary sinus tenderness present.      Mouth/Throat:      Pharynx: Posterior oropharyngeal erythema present.   Cardiovascular:      Rate and Rhythm: Normal rate.      Pulses: Normal pulses.   Pulmonary:      Effort: Pulmonary effort is normal.   Abdominal:      General: Bowel sounds are normal.   Musculoskeletal:         General: Normal range of motion.      Cervical back: Normal range of motion.   Neurological:      Mental Status: He is alert and oriented to person, place, and time.   Psychiatric:         Mood and Affect: Mood normal.         Vitals:    01/17/25 1455   BP: 130/66   BP Location: Left arm   Patient Position: Sitting   Pulse: 69   Resp: 18   Temp: 98.9 °F (37.2 °C)   TempSrc: Tympanic   SpO2: 97%   Weight: 80.7 kg (177 lb 14.6 oz)   Height: 5' 5" (1.651 m)    Body mass index is 29.61 kg/m².  Weight: 80.7 kg (177 lb 14.6 oz)   Height: 5' 5" (165.1 cm)       Health Maintenance         Date Due Completion Date    TETANUS VACCINE Never done ---    Shingles Vaccine (1 of 2) Never done ---    Pneumococcal Vaccines (Age 50+) (1 of 1 - PCV) Never done ---    COVID-19 Vaccine (7 - 2024-25 season) 09/01/2024 12/21/2021    Colorectal Cancer Screening 09/18/2025 9/18/2020    Hemoglobin A1c (Prediabetes) 09/27/2025 9/27/2024    PROSTATE-SPECIFIC ANTIGEN 12/30/2025 12/30/2024 "    Lipid Panel 09/27/2029 9/27/2024    RSV Vaccine (Age 60+ and Pregnant patients) (1 - 1-dose 75+ series) 04/15/2041 ---            Assessment & Plan      IMPRESSION:  -Diagnosed sinus congestion and blockage, likely due to recent travel and exposure at large event ([employer] [place])  -Negative for flu and COVID-19, but noted it could be too early for COVID-19 to test positive  -Noted fluid in ear, possibly exacerbated by recent plane ride  -Considered pre-diabetes status (A1C 5.7) when determining steroid dosage.    SINUSITIS:  - Evaluated the patient's sinus congestion symptoms, noting maxillary sinus pressure that started yesterday.  - Confirmed the absence of post-nasal drip or chest congestion.  - Assessed that the sinuses are clogged due to mucus and inflammation.  - Educated the patient about sinus congestion symptoms, causes, and the mechanism of action for nasal steroid and antihistamine sprays in reducing inflammation and mucus production.  - Prescribed Flonase (nasal steroid spray): 1 spray in each nostril every morning and night.  - Prescribed Azelastine (nasal antihistamine spray) to be used after Flonase has settled.  - Administered Decadron (dexamethasone) 10 mg injection in office.    EAR FLUID:  - Observed fluid in the patient's ears, comparing the eardrum appearance to water on a window.  - Noted the patient's report of feeling underwater and needing to pop their ears.  - Assessed that the recent plane ride may have contributed to the ear fluid.  - Incorporated treatment for ear fluid into the overall treatment plan for sinusitis.    PREDIABETES:  - Reviewed the patient's last A1C level, which was 5.7.  - Confirmed prediabetes status based on the borderline A1C level of 5.7.    OTHER INSTRUCTIONS:  - Inquired about chills, which the patient denied experiencing.  - Noted that the patient was swabbed for influenza and COVID-19, with both tests returning negative results.     FOLLOW-UP:  -RTC as  needed       Problem List Items Addressed This Visit    None  Visit Diagnoses       Acute non-recurrent ethmoidal sinusitis    -  Primary    Relevant Medications    dexAMETHasone injection 10 mg (Completed)    fluticasone propionate (FLONASE) 50 mcg/actuation nasal spray    azelastine (ASTELIN) 137 mcg (0.1 %) nasal spray    Other Relevant Orders    POCT Influenza A/B Molecular (Completed)    POCT COVID-19 Rapid Screening (Completed)    Fluid level behind tympanic membrane of both ears        Relevant Medications    dexAMETHasone injection 10 mg (Completed)    fluticasone propionate (FLONASE) 50 mcg/actuation nasal spray    azelastine (ASTELIN) 137 mcg (0.1 %) nasal spray    Acute cough        Relevant Medications    dexAMETHasone injection 10 mg (Completed)    fluticasone propionate (FLONASE) 50 mcg/actuation nasal spray    azelastine (ASTELIN) 137 mcg (0.1 %) nasal spray              Healthcare Maintenance: Deferred at this time.      23+ minutes of total time spent on the encounter, which includes face to face time and non-face to face time preparing to see the patient (eg, review of tests), Obtaining and/or reviewing separately obtained history, documenting clinical information in the electronic or other health record, independently interpreting results (not separately reported) and communicating results to the patient/family/caregiver, or Care coordination (not separately reported). Visit today included increased complexity associated with the care of the episodic problem addressed and managing the longitudinal care of the patient due to the serious and/or complex managed problem(s).        Sincerely,  Jhony Castro NP

## 2025-01-27 ENCOUNTER — LAB VISIT (OUTPATIENT)
Dept: LAB | Facility: HOSPITAL | Age: 60
End: 2025-01-27
Attending: UROLOGY
Payer: COMMERCIAL

## 2025-01-27 DIAGNOSIS — R97.20 ELEVATED PSA: ICD-10-CM

## 2025-01-27 LAB
ANION GAP SERPL CALC-SCNC: 9 MMOL/L (ref 8–16)
BUN SERPL-MCNC: 9 MG/DL (ref 6–20)
CALCIUM SERPL-MCNC: 8.9 MG/DL (ref 8.7–10.5)
CHLORIDE SERPL-SCNC: 106 MMOL/L (ref 95–110)
CO2 SERPL-SCNC: 23 MMOL/L (ref 23–29)
CREAT SERPL-MCNC: 1.1 MG/DL (ref 0.5–1.4)
EST. GFR  (NO RACE VARIABLE): >60 ML/MIN/1.73 M^2
GLUCOSE SERPL-MCNC: 109 MG/DL (ref 70–110)
POTASSIUM SERPL-SCNC: 4.1 MMOL/L (ref 3.5–5.1)
SODIUM SERPL-SCNC: 138 MMOL/L (ref 136–145)

## 2025-01-27 PROCEDURE — 36415 COLL VENOUS BLD VENIPUNCTURE: CPT | Performed by: UROLOGY

## 2025-01-27 PROCEDURE — 80048 BASIC METABOLIC PNL TOTAL CA: CPT | Performed by: UROLOGY

## 2025-02-10 ENCOUNTER — TELEPHONE (OUTPATIENT)
Dept: UROLOGY | Facility: CLINIC | Age: 60
End: 2025-02-10
Payer: COMMERCIAL

## 2025-02-10 ENCOUNTER — LAB VISIT (OUTPATIENT)
Dept: LAB | Facility: HOSPITAL | Age: 60
End: 2025-02-10
Attending: UROLOGY
Payer: COMMERCIAL

## 2025-02-10 DIAGNOSIS — R97.20 ELEVATED PSA: Primary | ICD-10-CM

## 2025-02-10 LAB — COMPLEXED PSA SERPL-MCNC: 10.5 NG/ML (ref 0–4)

## 2025-02-10 PROCEDURE — 36415 COLL VENOUS BLD VENIPUNCTURE: CPT | Performed by: UROLOGY

## 2025-02-10 PROCEDURE — 84153 ASSAY OF PSA TOTAL: CPT | Performed by: UROLOGY

## 2025-02-10 NOTE — TELEPHONE ENCOUNTER
.Outgoing call placed to patient, patient verified name and date of birth, patient notified of below, he verbalized understanding and this nurse tried contacting radiology to schedule several times but no answer, will try again in the morning and let patient know.      ----- Message from Tamara Brasher MD sent at 2/10/2025  4:02 PM CST -----  Notify pt his PSA is now further elevated. I would like him to complete another MRI of the prostate. Please schedule and then schedule a follow up with me within a few days. Can be a virtual.

## 2025-02-11 ENCOUNTER — TELEPHONE (OUTPATIENT)
Dept: UROLOGY | Facility: CLINIC | Age: 60
End: 2025-02-11
Payer: COMMERCIAL

## 2025-02-11 ENCOUNTER — PATIENT MESSAGE (OUTPATIENT)
Dept: UROLOGY | Facility: CLINIC | Age: 60
End: 2025-02-11
Payer: COMMERCIAL

## 2025-02-11 DIAGNOSIS — R97.20 ELEVATED PSA: Primary | ICD-10-CM

## 2025-02-11 RX ORDER — DIAZEPAM 10 MG/1
TABLET ORAL
Qty: 1 TABLET | Refills: 0 | Status: SHIPPED | OUTPATIENT
Start: 2025-02-11

## 2025-02-11 NOTE — TELEPHONE ENCOUNTER
.Outgoing call placed to patient, patient verified name and date of birth, notified patient of scheduled MRI, needed lab and follow up, he verbalized understanding and agreed to scheduled appointments, no further assistance needed.    ----- Message from BARNEY Lara sent at 2/10/2025  4:43 PM CST -----  Call Radiology to scheduled MRI, first available and schedule a virtual visit to f/u.

## 2025-02-13 ENCOUNTER — LAB VISIT (OUTPATIENT)
Dept: LAB | Facility: HOSPITAL | Age: 60
End: 2025-02-13
Attending: UROLOGY
Payer: COMMERCIAL

## 2025-02-13 DIAGNOSIS — R97.20 ELEVATED PSA: ICD-10-CM

## 2025-02-13 LAB
CREAT SERPL-MCNC: 1.8 MG/DL (ref 0.5–1.4)
EST. GFR  (NO RACE VARIABLE): 43 ML/MIN/1.73 M^2

## 2025-02-13 PROCEDURE — 82565 ASSAY OF CREATININE: CPT | Performed by: UROLOGY

## 2025-02-13 PROCEDURE — 36415 COLL VENOUS BLD VENIPUNCTURE: CPT | Performed by: UROLOGY

## 2025-02-15 ENCOUNTER — PATIENT MESSAGE (OUTPATIENT)
Dept: UROLOGY | Facility: CLINIC | Age: 60
End: 2025-02-15
Payer: COMMERCIAL

## 2025-02-17 ENCOUNTER — HOSPITAL ENCOUNTER (OUTPATIENT)
Dept: RADIOLOGY | Facility: HOSPITAL | Age: 60
Discharge: HOME | End: 2025-02-17
Attending: UROLOGY
Payer: COMMERCIAL

## 2025-02-17 DIAGNOSIS — R97.20 ELEVATED PSA: ICD-10-CM

## 2025-02-17 PROCEDURE — 72197 MRI PELVIS W/O & W/DYE: CPT | Mod: TC,PN

## 2025-02-17 PROCEDURE — A9585 GADOBUTROL INJECTION: HCPCS | Mod: PN | Performed by: UROLOGY

## 2025-02-17 PROCEDURE — 72197 MRI PELVIS W/O & W/DYE: CPT | Mod: 26,,, | Performed by: RADIOLOGY

## 2025-02-17 PROCEDURE — 25500020 PHARM REV CODE 255: Mod: PN | Performed by: UROLOGY

## 2025-02-17 RX ORDER — GADOBUTROL 604.72 MG/ML
8 INJECTION INTRAVENOUS
Status: COMPLETED | OUTPATIENT
Start: 2025-02-17 | End: 2025-02-17

## 2025-02-17 RX ADMIN — GADOBUTROL 8 ML: 604.72 INJECTION INTRAVENOUS at 11:02

## 2025-02-18 ENCOUNTER — LAB VISIT (OUTPATIENT)
Dept: LAB | Facility: HOSPITAL | Age: 60
End: 2025-02-18
Attending: UROLOGY
Payer: COMMERCIAL

## 2025-02-18 ENCOUNTER — OFFICE VISIT (OUTPATIENT)
Dept: UROLOGY | Facility: CLINIC | Age: 60
End: 2025-02-18
Payer: COMMERCIAL

## 2025-02-18 VITALS
RESPIRATION RATE: 18 BRPM | HEIGHT: 65 IN | SYSTOLIC BLOOD PRESSURE: 140 MMHG | WEIGHT: 179.44 LBS | DIASTOLIC BLOOD PRESSURE: 78 MMHG | BODY MASS INDEX: 29.9 KG/M2

## 2025-02-18 DIAGNOSIS — N40.1 BPH WITH OBSTRUCTION/LOWER URINARY TRACT SYMPTOMS: ICD-10-CM

## 2025-02-18 DIAGNOSIS — R79.89 ELEVATED SERUM CREATININE: ICD-10-CM

## 2025-02-18 DIAGNOSIS — R97.20 ELEVATED PSA: Primary | ICD-10-CM

## 2025-02-18 DIAGNOSIS — N13.8 BPH WITH OBSTRUCTION/LOWER URINARY TRACT SYMPTOMS: ICD-10-CM

## 2025-02-18 PROCEDURE — 80048 BASIC METABOLIC PNL TOTAL CA: CPT | Performed by: UROLOGY

## 2025-02-18 PROCEDURE — 36415 COLL VENOUS BLD VENIPUNCTURE: CPT | Mod: PN | Performed by: UROLOGY

## 2025-02-18 RX ORDER — ALFUZOSIN HYDROCHLORIDE 10 MG/1
10 TABLET, EXTENDED RELEASE ORAL
Qty: 30 TABLET | Refills: 11 | Status: SHIPPED | OUTPATIENT
Start: 2025-02-18 | End: 2026-02-18

## 2025-02-18 NOTE — PROGRESS NOTES
Chief Complaint   Patient presents with    Follow-up     MRI review            History of Present Illness:   Vinny Stover is a 59 y.o. male here for evaluation of Follow-up (MRI review )    2/18/25-MRI completed yesterday still read as PI RADS 2. 94cc gland. Pt completed 3 weeks of bactrim. Creatinine was recently very elevated. Having some stranguria at nighttime. Sometimes double voiding.   1/3/25-PSA is back up. Nocturia x 2 with some double voiding during the night. Some feelings of incomplete emptying. Doing great with his running.   7/9/24-nocturia has improved to 2. Still with urinary frequency but drinks a lot of coffee. Has run 3 more 1/2 marathons. Has a health  for dietary needs. Stream is good. No gross hematuria.   1/9/24- Pt has run 2 1/2 marathons and is really improving his diet. Pt has some urinary frequency and urgency, but reports that he drinks a lot of coffee. Stream is good. No hesitancy or stranguria. Pt would like to stay off of medication as long as he can.   7/5/23- Never took Alfuzosin because he didn't want to take medication if he didn't have to. Walking a lot more. Nocturia is improved, now to 1-2. Still has some urgency. Stream is good. No dysuria. Had some slight pain in his left flank the other day after running 6 miles for the first time. Has a h/o stones, but none for years.   12/19/22- here to further discuss flomax. It has helped his urination, but has been making him dizzy. He also notes ejaculatory changes. Doesn't really like to take medication.   12/13/22-TRUS biopsy pathology benign with chronic inflammation. He does have urinary frequency and urgency. He has a good stream. No hematuria at this point.   11/28/22-here for TRUS biopsy. 60g prostate  11/7/22- PSA up a little. No stranguria or dysuria. He does have frequency. Nocturia x 3. Drinks a lot of coffee.   8/5/22- MRI read as PI RADS 2. Prostate measures at 61cc. No stranguria, gross hematuria. He does have  frequency. PSA did decrease after last visit to 5.3.   7/5/22- 58yo male here for evaluation of elevated PSA of 7.7. Pt denies any h/o elevated PSA. Did have prostatitis 5-6 years ago and was given antibiotics at that time. Reports having urinary frequency. Does drink a lot of coffee. Nocturia x 2. No gross hematuria or dysuria. Stream is strong.   No family h/o CaP.       Review of Systems   Constitutional:  Negative for chills and fever.   Respiratory:  Negative for shortness of breath.    Cardiovascular:  Negative for chest pain.   Genitourinary:  Negative for dysuria and hematuria.   All other systems reviewed and are negative.      Past Medical History:   Diagnosis Date    Aphthous stomatitis 12/26/2019    Arthritis     Colon polyp     Gastroesophageal reflux disease without esophagitis 02/28/2018    Obstructive sleep apnea syndrome 12/26/2019       Past Surgical History:   Procedure Laterality Date    COLONOSCOPY  2015    NOSE SURGERY         Family History   Problem Relation Name Age of Onset    Drug abuse Mother      No Known Problems Father      Colon cancer Paternal Grandfather         Social History     Tobacco Use    Smoking status: Never     Passive exposure: Never    Smokeless tobacco: Never   Substance Use Topics    Alcohol use: No    Drug use: No       Current Outpatient Medications   Medication Sig Dispense Refill    azelastine (ASTELIN) 137 mcg (0.1 %) nasal spray 1 spray (137 mcg total) by Nasal route 2 (two) times daily. 90 mL 0    diazePAM (VALIUM) 10 MG Tab Take 1 po 45 minutes before procedure 1 tablet 0    fluticasone propionate (FLONASE) 50 mcg/actuation nasal spray 1 spray (50 mcg total) by Each Nostril route 2 (two) times daily. 16 g 0    alfuzosin (UROXATRAL) 10 mg Tb24 Take 1 tablet (10 mg total) by mouth daily with breakfast. 30 tablet 11     No current facility-administered medications for this visit.       Review of patient's allergies indicates:  No Known Allergies    Physical  Exam  Vitals:    02/18/25 1314   BP: (!) 140/78   Resp: 18               General: Well-developed, well-nourished in no acute distress  HEENT: Normocephalic, atraumatic, Extraocular movements intact  Neck: supple, trachea midline, no cervical or supraclavicular lymphadenopathy  Respirations: even and unlabored  Rectal: 7/9/24-60g prostate, no nodules or tenderness. No gross blood  Extremities: atraumatic, moves all equally, no clubbing, cyanosis or edema  Psych: normal affect  Skin: warm and dry, no lesions  Neuro: Alert and oriented, Cranial nerves II-XII grossly intact    UA: trace LE, otherwise negative      PSA  3/26/19: 3.0  6/20/22: 7.7  7/8/22: 5.3  8/5/22: 4.1  11/3/22: 5.1  6/12/23: 4.1  1/5/24: 4.6  7/1/24: 3.9  12/30/24: 7.4  2/10/25: 10.5    Assessment:   1. Elevated PSA  Prostate Specific Antigen, Diagnostic      2. Elevated serum creatinine  Basic Metabolic Panel    POCT Urinalysis, Dipstick, Automated, W/O Scope      3. BPH with obstruction/lower urinary tract symptoms                Plan:  Elevated PSA  -     Prostate Specific Antigen, Diagnostic; Future; Expected date: 05/18/2025    Elevated serum creatinine  -     Basic Metabolic Panel; Future; Expected date: 02/18/2025  -     POCT Urinalysis, Dipstick, Automated, W/O Scope    BPH with obstruction/lower urinary tract symptoms    Other orders  -     alfuzosin (UROXATRAL) 10 mg Tb24; Take 1 tablet (10 mg total) by mouth daily with breakfast.  Dispense: 30 tablet; Refill: 11            Follow up in about 3 months (around 5/18/2025) for labs before visit.

## 2025-02-19 ENCOUNTER — RESULTS FOLLOW-UP (OUTPATIENT)
Dept: UROLOGY | Facility: CLINIC | Age: 60
End: 2025-02-19

## 2025-02-19 LAB
ANION GAP SERPL CALC-SCNC: 8 MMOL/L (ref 8–16)
BUN SERPL-MCNC: 6 MG/DL (ref 6–20)
CALCIUM SERPL-MCNC: 9.1 MG/DL (ref 8.7–10.5)
CHLORIDE SERPL-SCNC: 105 MMOL/L (ref 95–110)
CO2 SERPL-SCNC: 28 MMOL/L (ref 23–29)
CREAT SERPL-MCNC: 1 MG/DL (ref 0.5–1.4)
EST. GFR  (NO RACE VARIABLE): >60 ML/MIN/1.73 M^2
GLUCOSE SERPL-MCNC: 85 MG/DL (ref 70–110)
POTASSIUM SERPL-SCNC: 3.8 MMOL/L (ref 3.5–5.1)
SODIUM SERPL-SCNC: 141 MMOL/L (ref 136–145)

## 2025-05-05 ENCOUNTER — HOSPITAL ENCOUNTER (OUTPATIENT)
Dept: RADIOLOGY | Facility: HOSPITAL | Age: 60
Discharge: HOME | End: 2025-05-05
Attending: STUDENT IN AN ORGANIZED HEALTH CARE EDUCATION/TRAINING PROGRAM
Payer: COMMERCIAL

## 2025-05-05 ENCOUNTER — OFFICE VISIT (OUTPATIENT)
Dept: ORTHOPEDICS | Facility: CLINIC | Age: 60
End: 2025-05-05
Payer: COMMERCIAL

## 2025-05-05 VITALS — WEIGHT: 179.44 LBS | BODY MASS INDEX: 29.9 KG/M2 | HEIGHT: 65 IN

## 2025-05-05 DIAGNOSIS — M25.512 LEFT SHOULDER PAIN, UNSPECIFIED CHRONICITY: ICD-10-CM

## 2025-05-05 DIAGNOSIS — S43.52XA ACROMIOCLAVICULAR SPRAIN, LEFT, INITIAL ENCOUNTER: Primary | ICD-10-CM

## 2025-05-05 DIAGNOSIS — M25.512 LEFT SHOULDER PAIN, UNSPECIFIED CHRONICITY: Primary | ICD-10-CM

## 2025-05-05 PROCEDURE — 73030 X-RAY EXAM OF SHOULDER: CPT | Mod: TC,LT

## 2025-05-05 PROCEDURE — 99204 OFFICE O/P NEW MOD 45 MIN: CPT | Mod: S$GLB,,, | Performed by: STUDENT IN AN ORGANIZED HEALTH CARE EDUCATION/TRAINING PROGRAM

## 2025-05-05 PROCEDURE — 73030 X-RAY EXAM OF SHOULDER: CPT | Mod: 26,LT,, | Performed by: STUDENT IN AN ORGANIZED HEALTH CARE EDUCATION/TRAINING PROGRAM

## 2025-05-05 PROCEDURE — 99999 PR PBB SHADOW E&M-EST. PATIENT-LVL III: CPT | Mod: PBBFAC,,, | Performed by: STUDENT IN AN ORGANIZED HEALTH CARE EDUCATION/TRAINING PROGRAM

## 2025-05-05 RX ORDER — MELOXICAM 7.5 MG/1
7.5 TABLET ORAL DAILY
Qty: 30 TABLET | Refills: 0 | Status: SHIPPED | OUTPATIENT
Start: 2025-05-05

## 2025-05-05 RX ORDER — METHYLPREDNISOLONE 4 MG/1
TABLET ORAL
Qty: 21 EACH | Refills: 0 | Status: SHIPPED | OUTPATIENT
Start: 2025-05-05

## 2025-05-05 NOTE — PROGRESS NOTES
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  Two days ago, patient was at his daughter's wedding when he hit his shoulder between a while in a door.  Yesterday, he moved a recliner.  Last night, he noted that he had severe pain in his shoulder and was unable to move it.  He had pain over night as well.  He does have a history of frozen shoulder in the remote past.  He states his pain level is a 7/10.  He has pain with trying to lay down.  He describes the pain as throbbing in nature.  He is unable to lift.  He has no history of diabetes.  He does not take blood thinners.  No numbness or tingling.  No neck pain.    Review of Systems  Review of systems negative for chest pain, shortness of breath, fevers, chills, nausea/vomiting.    Past Medical History  Past Medical History:   Diagnosis Date    Aphthous stomatitis 12/26/2019    Arthritis     Colon polyp     Gastroesophageal reflux disease without esophagitis 02/28/2018    Obstructive sleep apnea syndrome 12/26/2019       Past Surgical History  Past Surgical History:   Procedure Laterality Date    COLONOSCOPY  2015    NOSE SURGERY         Allergies  Review of patient's allergies indicates:  No Known Allergies    Family History  Family History   Problem Relation Name Age of Onset    Drug abuse Mother      No Known Problems Father      Colon cancer Paternal Grandfather         Social History  Social History[1]    Vital Signs  There were no vitals filed for this visit.    Physical Exam  Constitutional: Appears well-developed and well-nourished. No distress.   HENT:   Head: Normocephalic.   Eyes: EOM are normal.   Pulmonary/Chest: Effort normal.   Neurological: Oriented to person, place, and time.   Psychiatric: Normal mood and affect.     Left Upper  Extremity:  No abrasions, lacerations, wounds.  No swelling.  No erythema.  No drainage.  No ecchymosis.  Patient has tenderness over the AC joint.  No deformity is noted.  Positive cross-body adduction test.  Patient also has some tenderness over the anterior glenohumeral articulation.  Active shoulder abduction and flexion to 70° with the associated pain.  External rotation to 20° with the associated pain.  Internal rotation of the sacrum.  Patient has pain with all movement of the shoulder.  Sensation is intact in the median, radial, ulnar nerve distributions.  Palpable radial pulse.    Imaging  Left shoulder x-rays four views were obtained today and independently reviewed by myself.  Mild-to-moderate arthritic changes are noted at the acromioclavicular articulation.  The AC joint is concentrically reduced.  There is no evidence of glenohumeral arthritis.  There is evidence of calcific tendonitis at the insertion of the rotator cuff on the greater tuberosity.  No dislocation or subluxation noted of the glenohumeral articulation.    Assessment and Plan  60-year-old male presents for evaluation of left shoulder pain possibly related to hitting a wall with the shoulder 2 days ago versus moving furniture yesterday.  I discussed possible diagnoses.  Patient does have some tenderness over the acromioclavicular articulation which could be consistent with an AC sprain.  Additionally, he has pain with all movement of the shoulder which could be due to recurrence of frozen shoulder.  For now, I discussed treating with rest and medication.  I sent a script for meloxicam (take with food) and a Medrol dose pack to patient's pharmacy.  Follow up in clinic in 2 weeks for repeat evaluation if continued pain.  If doing better, okay to cancel.    Bridget Patterson MD  Orthopaedic Hand Surgery       [1]   Social History  Socioeconomic History    Marital status:    Occupational History     Employer: other   Tobacco Use     Smoking status: Never     Passive exposure: Never    Smokeless tobacco: Never   Substance and Sexual Activity    Alcohol use: No    Drug use: No    Sexual activity: Yes     Partners: Female     Social Drivers of Health     Financial Resource Strain: Low Risk  (10/6/2024)    Overall Financial Resource Strain (CARDIA)     Difficulty of Paying Living Expenses: Not hard at all   Food Insecurity: No Food Insecurity (10/6/2024)    Hunger Vital Sign     Worried About Running Out of Food in the Last Year: Never true     Ran Out of Food in the Last Year: Never true   Transportation Needs: No Transportation Needs (10/19/2021)    PRAPARE - Transportation     Lack of Transportation (Medical): No     Lack of Transportation (Non-Medical): No   Physical Activity: Sufficiently Active (10/6/2024)    Exercise Vital Sign     Days of Exercise per Week: 6 days     Minutes of Exercise per Session: 60 min   Stress: No Stress Concern Present (10/6/2024)    [employer] [place] [employer] - Occupational Stress Questionnaire     Feeling of Stress : Not at all   Housing Stability: Unknown (10/19/2021)    Housing Stability Vital Sign     Unable to Pay for Housing in the Last Year: No     Unstable Housing in the Last Year: No

## 2025-05-12 ENCOUNTER — LAB VISIT (OUTPATIENT)
Dept: LAB | Facility: HOSPITAL | Age: 60
End: 2025-05-12
Attending: UROLOGY
Payer: COMMERCIAL

## 2025-05-12 DIAGNOSIS — R97.20 ELEVATED PSA: ICD-10-CM

## 2025-05-12 LAB — PSA SERPL-MCNC: 7.03 NG/ML

## 2025-05-12 PROCEDURE — 36415 COLL VENOUS BLD VENIPUNCTURE: CPT

## 2025-05-12 PROCEDURE — 84153 ASSAY OF PSA TOTAL: CPT

## 2025-05-21 ENCOUNTER — OFFICE VISIT (OUTPATIENT)
Dept: UROLOGY | Facility: CLINIC | Age: 60
End: 2025-05-21
Payer: COMMERCIAL

## 2025-05-21 VITALS — WEIGHT: 180.75 LBS | BODY MASS INDEX: 30.08 KG/M2

## 2025-05-21 DIAGNOSIS — R97.20 ELEVATED PSA: ICD-10-CM

## 2025-05-21 DIAGNOSIS — N13.8 BPH WITH OBSTRUCTION/LOWER URINARY TRACT SYMPTOMS: Primary | ICD-10-CM

## 2025-05-21 DIAGNOSIS — N40.1 BPH WITH OBSTRUCTION/LOWER URINARY TRACT SYMPTOMS: Primary | ICD-10-CM

## 2025-05-21 LAB
BILIRUBIN, UA POC OHS: NEGATIVE
BLOOD, UA POC OHS: NEGATIVE
CLARITY, UA POC OHS: CLEAR
COLOR, UA POC OHS: YELLOW
GLUCOSE, UA POC OHS: NEGATIVE
KETONES, UA POC OHS: NEGATIVE
LEUKOCYTES, UA POC OHS: NEGATIVE
NITRITE, UA POC OHS: NEGATIVE
PH, UA POC OHS: 6
PROTEIN, UA POC OHS: NEGATIVE
SPECIFIC GRAVITY, UA POC OHS: 1.01
UROBILINOGEN, UA POC OHS: 0.2

## 2025-05-21 PROCEDURE — 99999 PR PBB SHADOW E&M-EST. PATIENT-LVL IV: CPT | Mod: PBBFAC,,, | Performed by: UROLOGY

## 2025-05-21 PROCEDURE — 99214 OFFICE O/P EST MOD 30 MIN: CPT | Mod: S$GLB,,, | Performed by: UROLOGY

## 2025-05-21 PROCEDURE — 81003 URINALYSIS AUTO W/O SCOPE: CPT | Mod: QW,S$GLB,, | Performed by: UROLOGY

## 2025-05-21 NOTE — PROGRESS NOTES
Chief Complaint   Patient presents with    Elevated PSA           History of Present Illness:   Vinny Stover is a 60 y.o. male here for evaluation of Elevated PSA    5/21/25-Taking alfuzosin and PSA has come down. Can feel that alfuzosin is helping. Nocturia x 1 now. No dysuria or gross hematuria. Stream is improved. Has some right inguinal pain that radiates into his testicle, but say maybe it is from running.   2/18/25-MRI completed yesterday still read as PI RADS 2. 94cc gland. Pt completed 3 weeks of bactrim. Creatinine was recently very elevated. Having some stranguria at nighttime. Sometimes double voiding.   1/3/25-PSA is back up. Nocturia x 2 with some double voiding during the night. Some feelings of incomplete emptying. Doing great with his running.   7/9/24-nocturia has improved to 2. Still with urinary frequency but drinks a lot of coffee. Has run 3 more 1/2 marathons. Has a health  for dietary needs. Stream is good. No gross hematuria.   1/9/24- Pt has run 2 1/2 marathons and is really improving his diet. Pt has some urinary frequency and urgency, but reports that he drinks a lot of coffee. Stream is good. No hesitancy or stranguria. Pt would like to stay off of medication as long as he can.   7/5/23- Never took Alfuzosin because he didn't want to take medication if he didn't have to. Walking a lot more. Nocturia is improved, now to 1-2. Still has some urgency. Stream is good. No dysuria. Had some slight pain in his left flank the other day after running 6 miles for the first time. Has a h/o stones, but none for years.   12/19/22- here to further discuss flomax. It has helped his urination, but has been making him dizzy. He also notes ejaculatory changes. Doesn't really like to take medication.   12/13/22-TRUS biopsy pathology benign with chronic inflammation. He does have urinary frequency and urgency. He has a good stream. No hematuria at this point.   11/28/22-here for TRUS biopsy. 60g  prostate  11/7/22- PSA up a little. No stranguria or dysuria. He does have frequency. Nocturia x 3. Drinks a lot of coffee.   8/5/22- MRI read as PI RADS 2. Prostate measures at 61cc. No stranguria, gross hematuria. He does have frequency. PSA did decrease after last visit to 5.3.   7/5/22- 56yo male here for evaluation of elevated PSA of 7.7. Pt denies any h/o elevated PSA. Did have prostatitis 5-6 years ago and was given antibiotics at that time. Reports having urinary frequency. Does drink a lot of coffee. Nocturia x 2. No gross hematuria or dysuria. Stream is strong.   No family h/o CaP.       Review of Systems   Constitutional:  Negative for chills and fever.   Respiratory:  Negative for shortness of breath.    Cardiovascular:  Negative for chest pain.   Genitourinary:  Negative for dysuria and hematuria.   All other systems reviewed and are negative.      Past Medical History:   Diagnosis Date    Aphthous stomatitis 12/26/2019    Arthritis     Colon polyp     Gastroesophageal reflux disease without esophagitis 02/28/2018    Obstructive sleep apnea syndrome 12/26/2019       Past Surgical History:   Procedure Laterality Date    COLONOSCOPY  2015    NOSE SURGERY         Family History   Problem Relation Name Age of Onset    Drug abuse Mother      No Known Problems Father      Colon cancer Paternal Grandfather         Social History     Tobacco Use    Smoking status: Never     Passive exposure: Never    Smokeless tobacco: Never   Substance Use Topics    Alcohol use: No    Drug use: No       Current Outpatient Medications   Medication Sig Dispense Refill    alfuzosin (UROXATRAL) 10 mg Tb24 Take 1 tablet (10 mg total) by mouth daily with breakfast. 30 tablet 11    azelastine (ASTELIN) 137 mcg (0.1 %) nasal spray 1 spray (137 mcg total) by Nasal route 2 (two) times daily. 90 mL 0    diazePAM (VALIUM) 10 MG Tab Take 1 po 45 minutes before procedure 1 tablet 0    fluticasone propionate (FLONASE) 50 mcg/actuation nasal  spray 1 spray (50 mcg total) by Each Nostril route 2 (two) times daily. 16 g 0    meloxicam (MOBIC) 7.5 MG tablet Take 1 tablet (7.5 mg total) by mouth once daily. Take with food. 30 tablet 0    methylPREDNISolone (MEDROL DOSEPACK) 4 mg tablet use as directed 21 each 0     No current facility-administered medications for this visit.       Review of patient's allergies indicates:  No Known Allergies    Physical Exam  Vitals:    05/21/25 1410   BP: (!) (P) 143/79   Pulse: (P) 60   Resp: (P) 16   Temp: (P) 98.7 °F (37.1 °C)               General: Well-developed, well-nourished in no acute distress  HEENT: Normocephalic, atraumatic, Extraocular movements intact  Neck: supple, trachea midline, no cervical or supraclavicular lymphadenopathy  Respirations: even and unlabored  : normal phallus without lesions, orthotopic urethral meatus, testicles descended bilaterally without mass, tenderness localizes to superior right spermatic cord. No palpable hernia or abnormality.   Rectal: 7/9/24-60g prostate, no nodules or tenderness. No gross blood  Extremities: atraumatic, moves all equally, no clubbing, cyanosis or edema  Psych: normal affect  Skin: warm and dry, no lesions  Neuro: Alert and oriented, Cranial nerves II-XII grossly intact    UA:negative for blood, LE, nit      PSA  3/26/19: 3.0  6/20/22: 7.7  7/8/22: 5.3  8/5/22: 4.1  11/3/22: 5.1  6/12/23: 4.1  1/5/24: 4.6  7/1/24: 3.9  12/30/24: 7.4  2/10/25: 10.5  5/12/25: 7.03    Assessment:   1. BPH with obstruction/lower urinary tract symptoms  POCT Urinalysis(Instrument)    CANCELED: POCT Bladder Scan      2. Elevated PSA  POCT Urinalysis(Instrument)    Prostate Specific Antigen, Diagnostic    CANCELED: POCT Bladder Scan              Plan:  BPH with obstruction/lower urinary tract symptoms  -     POCT Urinalysis(Instrument)  -     Cancel: POCT Bladder Scan    Elevated PSA  -     POCT Urinalysis(Instrument)  -     Cancel: POCT Bladder Scan  -     Prostate Specific  Antigen, Diagnostic; Future; Expected date: 11/21/2025      Continue alfuzosin      Follow up in about 6 months (around 11/21/2025) for labs before visit.

## 2025-09-05 ENCOUNTER — TELEPHONE (OUTPATIENT)
Dept: PRIMARY CARE CLINIC | Facility: CLINIC | Age: 60
End: 2025-09-05
Payer: COMMERCIAL

## 2025-09-05 DIAGNOSIS — Z00.00 ENCOUNTER FOR WELLNESS EXAMINATION IN ADULT: Primary | ICD-10-CM
